# Patient Record
Sex: FEMALE | Race: WHITE | NOT HISPANIC OR LATINO | Employment: OTHER | URBAN - NONMETROPOLITAN AREA
[De-identification: names, ages, dates, MRNs, and addresses within clinical notes are randomized per-mention and may not be internally consistent; named-entity substitution may affect disease eponyms.]

---

## 2017-01-09 ENCOUNTER — COMMUNICATION - GICH (OUTPATIENT)
Dept: INTERNAL MEDICINE | Facility: OTHER | Age: 82
End: 2017-01-09

## 2017-01-09 DIAGNOSIS — G47.00 INSOMNIA: ICD-10-CM

## 2017-01-12 ENCOUNTER — OFFICE VISIT - GICH (OUTPATIENT)
Dept: INTERNAL MEDICINE | Facility: OTHER | Age: 82
End: 2017-01-12

## 2017-01-12 ENCOUNTER — HISTORY (OUTPATIENT)
Dept: INTERNAL MEDICINE | Facility: OTHER | Age: 82
End: 2017-01-12

## 2017-01-12 DIAGNOSIS — Z12.31 ENCOUNTER FOR SCREENING MAMMOGRAM FOR MALIGNANT NEOPLASM OF BREAST: ICD-10-CM

## 2017-01-12 DIAGNOSIS — G47.00 INSOMNIA: ICD-10-CM

## 2017-01-12 DIAGNOSIS — E78.5 HYPERLIPIDEMIA: ICD-10-CM

## 2017-01-12 DIAGNOSIS — I10 ESSENTIAL (PRIMARY) HYPERTENSION: ICD-10-CM

## 2017-01-12 DIAGNOSIS — E03.9 HYPOTHYROIDISM: ICD-10-CM

## 2017-01-12 LAB
A/G RATIO - HISTORICAL: 1.8 (ref 1–2)
ALBUMIN SERPL-MCNC: 4.5 G/DL (ref 3.5–5.7)
ALP SERPL-CCNC: 13 IU/L (ref 34–104)
ALT (SGPT) - HISTORICAL: 20 IU/L (ref 7–52)
ANION GAP - HISTORICAL: 3 (ref 5–18)
AST SERPL-CCNC: 21 IU/L (ref 13–39)
BILIRUB SERPL-MCNC: 1 MG/DL (ref 0.3–1)
BUN SERPL-MCNC: 17 MG/DL (ref 7–25)
BUN/CREAT RATIO - HISTORICAL: 20
CALCIUM SERPL-MCNC: 10.1 MG/DL (ref 8.6–10.3)
CHLORIDE SERPLBLD-SCNC: 103 MMOL/L (ref 98–107)
CHOL/HDL RATIO - HISTORICAL: 3.55
CHOLESTEROL TOTAL: 167 MG/DL
CO2 SERPL-SCNC: 30 MMOL/L (ref 21–31)
CREAT SERPL-MCNC: 0.87 MG/DL (ref 0.7–1.3)
GFR IF NOT AFRICAN AMERICAN - HISTORICAL: >60 ML/MIN/1.73M2
GLOBULIN - HISTORICAL: 2.5 G/DL (ref 2–3.7)
GLUCOSE SERPL-MCNC: 94 MG/DL (ref 70–105)
HDLC SERPL-MCNC: 47 MG/DL (ref 23–92)
HEMOGLOBIN: 15.2 G/DL (ref 12–16)
LDLC SERPL CALC-MCNC: 92 MG/DL
MCV RBC AUTO: 94 FL (ref 80–100)
NON-HDL CHOLESTEROL - HISTORICAL: 120 MG/DL
PATIENT STATUS - HISTORICAL: NORMAL
POTASSIUM SERPL-SCNC: 4 MMOL/L (ref 3.5–5.1)
PROT SERPL-MCNC: 7 G/DL (ref 6.4–8.9)
SODIUM SERPL-SCNC: 136 MMOL/L (ref 133–143)
T4 FREE SERPL-MCNC: 0.94 NG/DL (ref 0.58–1.64)
TRIGL SERPL-MCNC: 141 MG/DL
TSH - HISTORICAL: 0.92 UIU/ML (ref 0.34–5.6)

## 2017-01-13 ENCOUNTER — HOSPITAL ENCOUNTER (OUTPATIENT)
Dept: RADIOLOGY | Facility: OTHER | Age: 82
End: 2017-01-13
Attending: INTERNAL MEDICINE

## 2017-01-13 ENCOUNTER — HISTORY (OUTPATIENT)
Dept: RADIOLOGY | Facility: OTHER | Age: 82
End: 2017-01-13

## 2017-01-13 DIAGNOSIS — Z12.31 ENCOUNTER FOR SCREENING MAMMOGRAM FOR MALIGNANT NEOPLASM OF BREAST: ICD-10-CM

## 2017-01-13 DIAGNOSIS — E03.9 HYPOTHYROIDISM: ICD-10-CM

## 2017-01-22 ENCOUNTER — HISTORY (OUTPATIENT)
Dept: EMERGENCY MEDICINE | Facility: OTHER | Age: 82
End: 2017-01-22

## 2017-01-30 ENCOUNTER — HISTORY (OUTPATIENT)
Dept: INTERNAL MEDICINE | Facility: OTHER | Age: 82
End: 2017-01-30

## 2017-01-30 ENCOUNTER — OFFICE VISIT - GICH (OUTPATIENT)
Dept: INTERNAL MEDICINE | Facility: OTHER | Age: 82
End: 2017-01-30

## 2017-01-30 DIAGNOSIS — I10 ESSENTIAL (PRIMARY) HYPERTENSION: ICD-10-CM

## 2017-01-30 DIAGNOSIS — G47.00 INSOMNIA: ICD-10-CM

## 2017-03-21 ENCOUNTER — COMMUNICATION - GICH (OUTPATIENT)
Dept: INTERNAL MEDICINE | Facility: OTHER | Age: 82
End: 2017-03-21

## 2017-03-21 DIAGNOSIS — I10 ESSENTIAL (PRIMARY) HYPERTENSION: ICD-10-CM

## 2017-07-30 ENCOUNTER — COMMUNICATION - GICH (OUTPATIENT)
Dept: INTERNAL MEDICINE | Facility: OTHER | Age: 82
End: 2017-07-30

## 2017-07-30 DIAGNOSIS — G47.00 INSOMNIA: ICD-10-CM

## 2017-08-12 ENCOUNTER — COMMUNICATION - GICH (OUTPATIENT)
Dept: INTERNAL MEDICINE | Facility: OTHER | Age: 82
End: 2017-08-12

## 2017-08-12 DIAGNOSIS — I10 ESSENTIAL (PRIMARY) HYPERTENSION: ICD-10-CM

## 2017-09-15 ENCOUNTER — COMMUNICATION - GICH (OUTPATIENT)
Dept: INTERNAL MEDICINE | Facility: OTHER | Age: 82
End: 2017-09-15

## 2017-09-15 DIAGNOSIS — I10 ESSENTIAL (PRIMARY) HYPERTENSION: ICD-10-CM

## 2017-10-21 ENCOUNTER — COMMUNICATION - GICH (OUTPATIENT)
Dept: INTERNAL MEDICINE | Facility: OTHER | Age: 82
End: 2017-10-21

## 2017-10-21 DIAGNOSIS — I10 ESSENTIAL (PRIMARY) HYPERTENSION: ICD-10-CM

## 2017-10-25 ENCOUNTER — COMMUNICATION - GICH (OUTPATIENT)
Dept: INTERNAL MEDICINE | Facility: OTHER | Age: 82
End: 2017-10-25

## 2017-10-25 DIAGNOSIS — I10 ESSENTIAL (PRIMARY) HYPERTENSION: ICD-10-CM

## 2017-10-30 ENCOUNTER — COMMUNICATION - GICH (OUTPATIENT)
Dept: INTERNAL MEDICINE | Facility: OTHER | Age: 82
End: 2017-10-30

## 2017-10-30 DIAGNOSIS — G47.00 INSOMNIA: ICD-10-CM

## 2017-11-28 ENCOUNTER — COMMUNICATION - GICH (OUTPATIENT)
Dept: INTERNAL MEDICINE | Facility: OTHER | Age: 82
End: 2017-11-28

## 2017-11-28 DIAGNOSIS — G47.00 INSOMNIA: ICD-10-CM

## 2017-12-28 NOTE — TELEPHONE ENCOUNTER
Patient Information     Patient Name MRN Sex Bea Fortune [id number] Female 1935      Telephone Encounter by Jenni Rivera RN at 9/15/2017  1:52 PM     Author:  Jenni Rivera RN Service:  (none) Author Type:  NURS- Registered Nurse     Filed:  9/15/2017  1:55 PM Encounter Date:  9/15/2017 Status:  Signed     :  Jenni Rivera RN (NURS- Registered Nurse)            Patient's pharmacy is requesting a change of Atenolol due to long term shortage of drug.     Medication:atenolol (TENORMIN) 100 mg tablet  Prescription #:[id number]    Qty:90 tablet   Ref:1  Start:8/15/2017   End:              Route:Oral                  JACQUES:No   Class:eRx    Sig:TAKE ONE TABLET BY MOUTH ONCE DAILY    Routed message to Evan Giles MD for consideration of a new Rx for another med.     Jenni Rivera RN ....................  9/15/2017   1:54 PM

## 2017-12-28 NOTE — TELEPHONE ENCOUNTER
Patient Information     Patient Name MRN Bea Ybarra [id number] Female 1935      Telephone Encounter by Judy Alvarado RN at 10/24/2017  4:22 PM     Author:  Judy Alvarado RN Service:  (none) Author Type:  NURS- Registered Nurse     Filed:  10/24/2017  4:24 PM Encounter Date:  10/21/2017 Status:  Signed     :  Judy Alvarado RN (NURS- Registered Nurse)            Changed to Metoprolol on 9/15/17 due to long term shortage of Atenolol  Unable to complete prescription refill per RN Medication Refill Policy.................... JUDY ALVARADO RN ....................  10/24/2017   4:23 PM

## 2017-12-28 NOTE — TELEPHONE ENCOUNTER
Patient Information     Patient Name MRN Sex Bea Fortune [id number] Female 1935      Telephone Encounter by Jenni Rosado RN at 2017  1:15 PM     Author:  Jenni Rosado RN Service:  (none) Author Type:  NURS- Registered Nurse     Filed:  2017  1:17 PM Encounter Date:  2017 Status:  Signed     :  Jenni Rosado RN (NURS- Registered Nurse)            This is a Refill request from: [facility]  Name of Medication: zolpidem  Quantity requested: 90  Last fill date: 5/3/17  Due for refill: yes  Last visit with AJITH GILES was on: 2017 in [facility]  PCP:  Ajith Giles MD  Controlled Substance Agreement:  None found  Diagnosis r/t this medication request: Insomnia, unspecified type     Unable to complete prescription refill per RN Medication Refill Policy.................... Jenni Rosado RN ....................  2017   1:16 PM

## 2017-12-28 NOTE — TELEPHONE ENCOUNTER
Patient Information     Patient Name MRN Sex Bea Fortune [id number] Female 1935      Telephone Encounter by Judy Lutz RN at 8/15/2017  3:29 PM     Author:  Judy Lutz RN Service:  (none) Author Type:  NURS- Registered Nurse     Filed:  8/15/2017  3:35 PM Encounter Date:  2017 Status:  Signed     :  Judy Lutz RN (NURS- Registered Nurse)            Beta Blockers     Office visit in the past 12 months or per provider note.    Last visit with AJITH HOWE was on: 2017 in [facility]  Next visit with AJITH HOWE is on: No future appointment listed with this provider  Next visit with Internal Medicine is on: No future appointment listed in this department    Max refill for 12 months from last office visit or per provider note.  Prescription refilled per RN Medication Refill Policy.................... JUDY LUTZ RN ....................  8/15/2017   3:34 PM

## 2017-12-28 NOTE — TELEPHONE ENCOUNTER
"Patient Information     Patient Name MRN Bea Ybarra [id number] Female 1935      Telephone Encounter by Judy Alvarado RN at 10/25/2017 12:14 PM     Author:  Judy Alvarado RN Service:  (none) Author Type:  NURS- Registered Nurse     Filed:  10/25/2017 12:16 PM Encounter Date:  10/25/2017 Status:  Signed     :  Judy Alvarado RN (NURS- Registered Nurse)            Received faxed message from [facility] pharmacy in regards to Atenolol being change to Toprol.    Note: \" patient would like to go back to Atenolol. We have it on - hand, and it is much less expensive. Please send new Rx\"..    JUDY ALVARADO RN ....................  10/25/2017   12:16 PM          "

## 2017-12-28 NOTE — TELEPHONE ENCOUNTER
Patient Information     Patient Name MRN Sex Bea Fortune [id number] Female 1935      Telephone Encounter by Jessica Ramirez DO at 2017  3:11 PM     Author:  Jessica Ramirez DO Service:  (none) Author Type:  PHYS- Osteopathic     Filed:  2017  3:11 PM Encounter Date:  10/30/2017 Status:  Signed     :  Jessica Ramirez DO (PHYS- Osteopathic)            Prescription printed and ready for faxing

## 2017-12-28 NOTE — TELEPHONE ENCOUNTER
Patient Information     Patient Name MRN Sex Bea Fortune [id number] Female 1935      Telephone Encounter by Jenni Rosado RN at 2017  2:58 PM     Author:  Jenni Rosado RN Service:  (none) Author Type:  NURS- Registered Nurse     Filed:  2017  3:00 PM Encounter Date:  10/30/2017 Status:  Signed     :  Jenni Rosado RN (NURS- Registered Nurse)            This is a Refill request from: [facility]  Name of Medication: zolpidem  Quantity requested: #90  Last fill date: 17  Due for refill: yes  Last visit with EVAN GILES was on: 2017 in [facility]  PCP:  Evan Giles MD  Controlled Substance Agreement:  na   Diagnosis r/t this medication request: insomnia      Unable to complete prescription refill per RN Medication Refill Policy.................... Jenni Rosado RN ....................  2017   2:58 PM

## 2017-12-28 NOTE — TELEPHONE ENCOUNTER
Patient Information     Patient Name MRN Sex Bea Fortune [id number] Female 1935      Telephone Encounter by Mirna Moreau RN at 2017  4:02 PM     Author:  Mirna Moreau RN Service:  (none) Author Type:  NURS- Registered Nurse     Filed:  2017  4:07 PM Encounter Date:  2017 Status:  Signed     :  Mirna Moreau RN (NURS- Registered Nurse)            This is a Refill request from: [facility]  Name of Medication: Zolpidem (AMBIEN) 5 mg tablet  Quantity requested: 90  Last fill date: 17  Due for refill: 17  Last visit with PCP:  Evan Giles MD was on 2017  Controlled Substance Agreement: none  Diagnosis r/t this medication request: Insomnia     Unable to complete prescription refill per RN Medication Refill Policy.................... Mirna Moreau RN ....................  2017   4:02 PM

## 2018-01-01 ENCOUNTER — COMMUNICATION - GICH (OUTPATIENT)
Dept: INTERNAL MEDICINE | Facility: OTHER | Age: 83
End: 2018-01-01

## 2018-01-01 DIAGNOSIS — I10 ESSENTIAL (PRIMARY) HYPERTENSION: ICD-10-CM

## 2018-01-02 NOTE — TELEPHONE ENCOUNTER
Patient Information     Patient Name MRN Sex Bea Fortune [id number] Female 1935      Telephone Encounter by Stacy Grier RN at 1/10/2017  9:33 AM     Author:  Stacy Grier RN Service:  (none) Author Type:  NURS- Registered Nurse     Filed:  1/10/2017  9:35 AM Encounter Date:  2017 Status:  Signed     :  Stacy Grier RN (NURS- Registered Nurse)            This is a Refill request from: [facility]  Name of Medication: zolpidem (AMBIEN) 5 mg tablet  TAKE ONE TABLET BY MOUTH AT BEDTIME  Quantity requested: 90  Last fill date: 10/13/16  Due for refill: 17  Last visit with AJITH HOWE was on: 2015 in [facility]  PCP:  Ajith Howe MD  Controlled Substance Agreement:  none   Diagnosis r/t this medication request: insomnia       Pt due for annual exam has not been seen since 7/28/15     Unable to complete prescription refill per RN Medication Refill Policy.................... STACY GRIER RN ....................  1/10/2017   9:33 AM

## 2018-01-03 NOTE — TELEPHONE ENCOUNTER
Patient Information     Patient Name MRN Sex Bea Fortune [id number] Female 1935      Telephone Encounter by Marilyn Chun RN at 3/22/2017  8:04 AM     Author:  Marilyn Chun RN Service:  (none) Author Type:  NURS- Registered Nurse     Filed:  3/22/2017  8:06 AM Encounter Date:  3/21/2017 Status:  Signed     :  Marilyn Chun RN (NURS- Registered Nurse)            Calcium Channel Blockers    Office visit in the past 12 months or per provider note.    Last visit with AJITH HOWE was on: 2017 in [facility]  Next visit with AJITH HOWE is on: No future appointment listed with this provider  Next visit with Internal Medicine is on: No future appointment listed in this department  BP Readings from Last 4 Encounters:    17 128/74   17 152/61   17 174/80   17 (!) 170/102       Review last provider visit note.  If BP reviewed and plan is noted, can refill.  Max refill for 12 months from last office visit or per provider note.  Prescription refilled per RN Medication Refill Policy.................... MARILYN CHUN RN ....................  3/22/2017   8:04 AM

## 2018-01-03 NOTE — ADDENDUM NOTE
Patient Information     Patient Name MRN Sex Bea Fortune [id number] Female 1935      Addendum Note by Evan Howe MD at 2017 11:20 AM     Author:  Evan Howe MD Service:  (none) Author Type:  Physician     Filed:  2017 11:20 AM Encounter Date:  2017 Status:  Signed     :  Evan Howe MD (Physician)       Addended by: EVAN HOWE on: 2017 11:20 AM        Modules accepted: Orders

## 2018-01-03 NOTE — PROGRESS NOTES
Patient Information     Patient Name MRN Bea Ybarra [id number] Female 1935      Progress Notes by Evan Giles MD at 2017 10:40 AM     Author:  Evan Giles MD Service:  (none) Author Type:  Physician     Filed:  2017 11:17 AM Encounter Date:  2017 Status:  Signed     :  Evan Giles MD (Physician)            SUBJECTIVE:    Bea Flores is a 81 y.o. female who presents for comprehensive review of their multiple medical problems and review of medications, renewal of medications and update on necessary health maintenance issues.      HPI Comments: She is here today for complete evaluation. She needs a refill on some of her medications and some updates. She has treated hypertension. She is on multiple drug therapy for this. Her blood pressure is somewhat high when she comes in today. She tells me that is never really been that high before. She feels well and has no complaints.    She also has a history of hypothyroidism. She is due for recheck on her thyroid. She does not have any symptoms of under or overactive thyroid. She has treated hyperlipidemia and tolerates her simvastatin without difficulty.    She does have some anxiety and is stable on citalopram. She has insomnia and is stable on Ambien 5 mg daily at bedtime. She does need refills. She does not get any immunizations because of some intolerances in the past but is asking today about shingles shot. I reviewed that with her. She is due for mammogram. She is due for lab work.    I spent time today reviewing her past medical history, past surgical history, family history and social history.      Allergies     Allergen  Reactions     Codeine *Unknown     Lisinopril *Unknown     Pneumovax 23 [Pneumococcal 23-Bertha Ps Vaccine] *Unknown     Trazodone *Unknown   ,   Current Outpatient Prescriptions     Medication  Sig     amLODIPine (NORVASC) 5 mg tablet Take 1 tablet by mouth once daily.     aspirin 325 mg tablet Take  325 mg by mouth once daily with a meal.     atenolol (TENORMIN) 100 mg tablet Take 1 tablet by mouth once daily.     calcium carbonate-vit D3, 600 mg-400 units, (CALCIUM PLUS) tablet Take 1 tablet by mouth once daily with a meal.     citalopram (CELEXA) 20 mg tablet Take 1 tablet by mouth once daily.     hydrochlorothiazide (HCTZ) 25 mg tablet Take 1 tablet by mouth once daily.     levothyroxine (SYNTHROID) 100 mcg tablet Take 1 tablet by mouth before breakfast.     simvastatin (ZOCOR) 40 mg tablet Take 1 tablet by mouth at bedtime.     zolpidem (AMBIEN) 5 mg tablet TAKE ONE TABLET BY MOUTH AT BEDTIME     No current facility-administered medications for this visit.      Medications have been reviewed by me and are current to the best of my knowledge and ability. 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       Cataract removal  Bilateral     and   Social History      Substance Use Topics        Smoking status:  Former Smoker     Types: Cigarettes     Quit date: 3/12/1990     Smokeless tobacco:  Not on file     Alcohol use  No     Family Status     Relation   Status     Father  at age 64     Mother  at age 80     Sister Alive     Sister Alive     Sister Alive     Brother Alive     Child Alive     Child Alive     Social History     Social History        Marital status:       Spouse name: N/A     Number of children:  N/A     Years of education:  N/A     Social History Main Topics        Smoking status:  Former Smoker     Types: Cigarettes     Quit date: 3/12/1990     Smokeless tobacco:  None     Alcohol use  No     Drug use:  No     Sexual activity:  Not Asked     Other Topics  Concern     None      Social History Narrative     .    of heart disease.  She is a retired  and travels with her daughter who lives in [place].  Presently working in the [employer] area at [employer].  Lives in town.  Two children.                               REVIEW OF SYSTEMS:  Review of Systems   Constitutional: Negative for chills, diaphoresis, fever, malaise/fatigue and weight loss.   HENT: Negative for congestion, ear pain, nosebleeds, sore throat and tinnitus.    Eyes: Negative for blurred vision, double vision, photophobia, pain, discharge and redness.   Respiratory: Negative for cough, hemoptysis, sputum production, shortness of breath and wheezing.    Cardiovascular: Negative for chest pain, palpitations, orthopnea, claudication, leg swelling and PND.   Gastrointestinal: Negative for abdominal pain, blood in stool, constipation, diarrhea, heartburn, nausea and vomiting.   Genitourinary: Negative for dysuria, flank pain and hematuria.   Musculoskeletal: Negative for back pain, joint pain, myalgias and neck pain.   Skin: Negative for itching and rash.   Neurological: Negative for dizziness, tingling, tremors, speech change, loss of consciousness, weakness and headaches.   Psychiatric/Behavioral: Negative for depression, hallucinations, memory loss, substance abuse and suicidal ideas. The patient is not nervous/anxious.        OBJECTIVE:  BP  "(!) 170/102  Pulse 72  Ht 1.656 m (5' 5.2\")  Wt 53.9 kg (118 lb 12.8 oz)  Breastfeeding? No  BMI 19.65 kg/m2    EXAM:   Physical Exam   Constitutional: She is oriented to person, place, and time and well-developed, well-nourished, and in no distress. No distress.   HENT:   Head: Normocephalic and atraumatic.   Right Ear: Tympanic membrane and external ear normal.   Left Ear: Tympanic membrane and external ear normal.   Nose: Nose normal.   Mouth/Throat: Oropharynx is clear and moist. No oropharyngeal exudate or posterior oropharyngeal erythema.   Eyes: Conjunctivae are normal. Pupils are equal, round, and reactive to light. Right eye exhibits no discharge. Left eye exhibits no discharge. No scleral icterus.   Neck: Normal range of motion. Neck supple. No JVD present. Carotid bruit is not present. No tracheal deviation present. No thyroid mass and no thyromegaly present.   Cardiovascular: Normal rate, regular rhythm and normal heart sounds.  Exam reveals no gallop and no friction rub.    No murmur heard.  Pulmonary/Chest: Effort normal and breath sounds normal. No respiratory distress. She has no decreased breath sounds. She has no wheezes. She has no rhonchi. She has no rales. She exhibits no tenderness.   Abdominal: Soft. Bowel sounds are normal. She exhibits no distension and no mass. There is no hepatosplenomegaly. There is no tenderness. There is no rebound and no guarding.   Musculoskeletal: Normal range of motion. She exhibits no edema or tenderness.   Lymphadenopathy:     She has no cervical adenopathy.   Neurological: She is alert and oriented to person, place, and time. She displays normal reflexes. No cranial nerve deficit. Gait normal. Coordination normal.   Skin: Skin is warm and dry. No rash noted. She is not diaphoretic. No erythema.   Psychiatric: Affect normal.   Nursing note and vitals reviewed.      ASSESSMENT/PLAN:    ICD-10-CM    1. Hypothyroidism, unspecified type E03.9 TSH      T4,FREE      XR " MAMMO BILAT SCREENING   2. Hyperlipidemia, unspecified hyperlipidemia type E78.5 LIPID PANEL   3. HYPERTENSION I10 COMP METABOLIC PANEL   4. Insomnia, unspecified type G47.00    5. Encounter for screening mammogram for malignant neoplasm of breast  Z12.31 XR MAMMO BILAT SCREENING        Plan:  Her exam today is normal. Her blood pressure is high, this is new. She normally checks her blood pressure at [facility] and at her cough at home and it has been fine although she admits she hasn't done it for a while. She will start doing that again over the next couple of weeks and will return if her blood pressure is high. Medications are refilled today, no changes are made. She is scheduled for a mammogram. Complete lab drawn and pending and I will send her a letter with the results and any recommendations. Follow-up will be annually if there are no new problems or concerns.

## 2018-01-03 NOTE — PROGRESS NOTES
Patient Information     Patient Name MRN Sex Bea Fortune [id number] Female 1935      Progress Notes by [facility] at 2017  1:15 PM     Author:  Penny Dee R.T. ([facility]) Service:  (none) Author Type:  (none)     Filed:  2017  1:16 PM Date of Service:  2017  1:15 PM Status:  Signed     :  Penny Dee R.T. ([facility]) ([facility] - Registered Radiologic Technologist)            Falls Risk Criteria:    Age 65 and older or under age 4        Sensory deficits    Poor vision    Use of ambulatory aides    Impaired judgment    Unable to walk independently    Meets High Risk criteria for falls:  Yes               1.  Do you have dizziness or vertigo?    no                    2.  Do you need help standing or walking?   no                 3.  Have you fallen within the last 6 months?    no           4.  Has the patient been fasting?      no       If any risks are marked Yes, the following interventions are utilized:    Do not leave patient unattended     Assist patient in the dressing room and bathroom    Have ambulatory aides available throughout procedure    Involve patient s family if available

## 2018-01-03 NOTE — PROGRESS NOTES
Patient Information     Patient Name MRN Sex Bea Fortune [id number] Female 1935      Progress Notes by Evan Giles MD at 2017  2:30 PM     Author:  Evan Giles MD Service:  (none) Author Type:  Physician     Filed:  2017  2:46 PM Encounter Date:  2017 Status:  Signed     :  Evan Giles MD (Physician)            SUBJECTIVE:    Bea Flores is a 81 y.o. female who presents for recheck on BP.    HPI Comments: She is here for follow-up on her blood pressure. She has been monitoring her blood pressure at home as well as at work at [employer]. She brings in those values today to review. 90% of them are in target range, a few are a little bit on the high side. She admits that she does have anxiety about the whole thing. Medications remain the same. She has no other complaints. [place]. She needs to have a refill on her Ambien.      Allergies     Allergen  Reactions     Codeine *Unknown     Lisinopril *Unknown     Pneumovax 23 [Pneumococcal 23-Bertha Ps Vaccine] *Unknown     Trazodone *Unknown   ,   Current Outpatient Prescriptions     Medication  Sig     acetaminophen (TYLENOL EXTRA STRGTH) 500 mg tablet Take 1,000 mg by mouth every 6 hours if needed. Max acetaminophen dose: 4000mg in 24 hrs.   Indications: Pain     amLODIPine (NORVASC) 5 mg tablet Take 1 tablet by mouth once daily.     aspirin 325 mg tablet Take 325 mg by mouth once daily with a meal.     atenolol (TENORMIN) 100 mg tablet Take 1 tablet by mouth once daily.     calcium carbonate-vit D3, 600 mg-400 units, (CALCIUM PLUS) tablet Take 1 tablet by mouth once daily with a meal.     citalopram (CELEXA) 20 mg tablet Take 1 tablet by mouth once daily.     hydroCHLOROthiazide (HCTZ) 25 mg tablet Take 1 tablet by mouth once daily.     levothyroxine (SYNTHROID) 100 mcg tablet Take 1 tablet by mouth before breakfast.     simvastatin (ZOCOR) 40 mg tablet Take 1 tablet by mouth at bedtime.      traMADol (ULTRAM) 50 mg tablet Take 1 tablet by mouth every 6 hours if needed for Pain.     zolpidem (AMBIEN) 5 mg tablet TAKE ONE TABLET BY MOUTH AT BEDTIME     No current facility-administered medications for this visit.      Medications have been reviewed by me and are current to the best of my knowledge and ability. 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      OBJECTIVE:  /74  Pulse 68  Wt 54.4 kg (120 lb)  Breastfeeding? No  BMI 19.97 kg/m2    EXAM:   Physical Exam   Constitutional: She is well-developed, well-nourished, and in no distress. No distress.   Skin: She is not diaphoretic.   Nursing note and vitals reviewed.      ASSESSMENT/PLAN:    ICD-10-CM    1. HYPERTENSION I10    2. Insomnia, unspecified type G47.00 zolpidem (AMBIEN) 5 mg tablet        Plan:  She was reassured that her blood pressure looks fine. Continue current medications. Refilled her Ambien. She will follow-up as needed.

## 2018-01-03 NOTE — NURSING NOTE
Patient Information     Patient Name MRN Sex Bea Fortune [id number] Female 1935      Nursing Note by Maday So LPN at 2017 10:40 AM     Author:  Maday So LPN Service:  (none) Author Type:  NURS- Licensed Practical Nurse     Filed:  2017 10:48 AM Encounter Date:  2017 Status:  Signed     :  Maday So LPN (NURS- Licensed Practical Nurse)            The patient is here today to get refills on her medications.  Maday So LPN......2017  10:39 AM

## 2018-01-03 NOTE — NURSING NOTE
Patient Information     Patient Name MRN Sex Bea Fortune [id number] Female 1935      Nursing Note by Maday So LPN at 2017  2:30 PM     Author:  Maday So LPN Service:  (none) Author Type:  NURS- Licensed Practical Nurse     Filed:  2017  2:31 PM Encounter Date:  2017 Status:  Signed     :  Maday So LPN (NURS- Licensed Practical Nurse)            The patient is here today to have a follow up on her blood pressure.  Maday So LPN......2017  2:22 PM

## 2018-01-03 NOTE — TELEPHONE ENCOUNTER
Patient Information     Patient Name MRN Sex Bea Fortune [id number] Female 1935      Telephone Encounter by Maday So LPN at 1/10/2017  1:30 PM     Author:  Maday So LPN Service:  (none) Author Type:  NURS- Licensed Practical Nurse     Filed:  1/10/2017  1:32 PM Encounter Date:  2017 Status:  Signed     :  Maday So LPN (NURS- Licensed Practical Nurse)            Contacted the patient and let her know she is due for a yearly check up. The patient was transferred to scheduling to set up a appointment.  Maday So LPN......1/10/2017  1:32 PM

## 2018-01-03 NOTE — TELEPHONE ENCOUNTER
Patient Information     Patient Name MRN Sex Bea Fortune [id number] Female 1935      Telephone Encounter by Maday So LPN at 1/10/2017 10:48 AM     Author:  Maday So LPN Service:  (none) Author Type:  NURS- Licensed Practical Nurse     Filed:  1/10/2017 10:49 AM Encounter Date:  2017 Status:  Signed     :  Maday So LPN (NURS- Licensed Practical Nurse)            Left a message the patient to call back.  Maday So LPN......1/10/2017  10:49 AM

## 2018-01-26 VITALS
DIASTOLIC BLOOD PRESSURE: 102 MMHG | HEART RATE: 72 BPM | BODY MASS INDEX: 19.79 KG/M2 | SYSTOLIC BLOOD PRESSURE: 170 MMHG | HEIGHT: 65 IN | WEIGHT: 118.8 LBS

## 2018-01-26 VITALS
WEIGHT: 120 LBS | BODY MASS INDEX: 19.85 KG/M2 | SYSTOLIC BLOOD PRESSURE: 128 MMHG | DIASTOLIC BLOOD PRESSURE: 74 MMHG | HEART RATE: 68 BPM

## 2018-02-11 ENCOUNTER — APPOINTMENT (OUTPATIENT)
Dept: GENERAL RADIOLOGY | Facility: OTHER | Age: 83
End: 2018-02-11
Attending: EMERGENCY MEDICINE
Payer: MEDICARE

## 2018-02-11 ENCOUNTER — HOSPITAL ENCOUNTER (EMERGENCY)
Facility: OTHER | Age: 83
Discharge: HOME | End: 2018-02-11
Attending: EMERGENCY MEDICINE | Admitting: EMERGENCY MEDICINE
Payer: MEDICARE

## 2018-02-11 VITALS
HEART RATE: 66 BPM | BODY MASS INDEX: 20.83 KG/M2 | SYSTOLIC BLOOD PRESSURE: 167 MMHG | WEIGHT: 125 LBS | DIASTOLIC BLOOD PRESSURE: 93 MMHG | HEIGHT: 65 IN | TEMPERATURE: 98.9 F | RESPIRATION RATE: 18 BRPM

## 2018-02-11 DIAGNOSIS — M54.50 ACUTE LEFT-SIDED LOW BACK PAIN WITHOUT SCIATICA: ICD-10-CM

## 2018-02-11 PROCEDURE — 99283 EMERGENCY DEPT VISIT LOW MDM: CPT | Mod: Z6 | Performed by: EMERGENCY MEDICINE

## 2018-02-11 PROCEDURE — 99283 EMERGENCY DEPT VISIT LOW MDM: CPT | Mod: 25 | Performed by: EMERGENCY MEDICINE

## 2018-02-11 PROCEDURE — 72100 X-RAY EXAM L-S SPINE 2/3 VWS: CPT

## 2018-02-11 RX ORDER — HYDROCHLOROTHIAZIDE 12.5 MG/1
25 CAPSULE ORAL DAILY
COMMUNITY
End: 2018-02-20

## 2018-02-11 RX ORDER — LATANOPROST 50 UG/ML
1 SOLUTION/ DROPS OPHTHALMIC AT BEDTIME
COMMUNITY
End: 2018-02-20

## 2018-02-11 RX ORDER — ASPIRIN 325 MG/1
325 TABLET, FILM COATED ORAL DAILY
COMMUNITY
End: 2018-02-20

## 2018-02-11 RX ORDER — HYDROCODONE BITARTRATE AND ACETAMINOPHEN 5; 325 MG/1; MG/1
1 TABLET ORAL EVERY 6 HOURS PRN
Qty: 12 TABLET | Refills: 0 | Status: SHIPPED | OUTPATIENT
Start: 2018-02-11 | End: 2018-02-20

## 2018-02-11 ASSESSMENT — ENCOUNTER SYMPTOMS: BACK PAIN: 1

## 2018-02-11 NOTE — ED AVS SNAPSHOT
[facility]    [address]    [place] [address]    Phone:  [phone]    Fax:  [phone]                                       Bea Flores   MRN: [id number]    Department:  [facility]   Date of Visit:  2/11/2018           After Visit Summary Signature Page     I have received my discharge instructions, and my questions have been answered. I have discussed any challenges I see with this plan with the nurse or doctor.    ..........................................................................................................................................  Patient/Patient Representative Signature      ..........................................................................................................................................  Patient Representative Print Name and Relationship to Patient    ..................................................               ................................................  Date                                            Time    ..........................................................................................................................................  Reviewed by Signature/Title    ...................................................              ..............................................  Date                                                            Time

## 2018-02-11 NOTE — DISCHARGE INSTRUCTIONS
1.  Stop tramadol  2.  Take Norco as instructed only if the pain is not tolerable; distended this pain medication may have the same sort of side effect and addictive properties as tramadol  3.  Consult with your primary care physician or an orthopedic surgeon for further care and recommendations if the pain persists

## 2018-02-11 NOTE — ED AVS SNAPSHOT
[facility]    [address]    [place] [address]    Phone:  [phone]    Fax:  [phone]                                       Bea Flores   MRN: [id number]    Department:  [facility]   Date of Visit:  2/11/2018           Patient Information     Date Of Birth          1935        Your diagnoses for this visit were:     Acute left-sided low back pain without sciatica        You were seen by Kristopher Blas MD.        Discharge Instructions       1.  Stop tramadol  2.  Take Norco as instructed only if the pain is not tolerable; distended this pain medication may have the same sort of side effect and addictive properties as tramadol  3.  Consult with your primary care physician or an orthopedic surgeon for further care and recommendations if the pain persists    24 Hour Appointment Hotline       To make an appointment at any [facility], call [phone]). If you don't have a family doctor or clinic, we will help you find one. [facility] clinics are conveniently located to serve the needs of you and your family.             Review of your medicines      START taking        Dose / Directions Last dose taken    HYDROcodone-acetaminophen 5-325 MG per tablet   Commonly known as:  NORCO   Dose:  1 tablet   Quantity:  12 tablet        Take 1 tablet by mouth every 6 hours as needed for moderate to severe pain   Refills:  0          Our records show that you are taking the medicines listed below. If these are incorrect, please call your family doctor or clinic.        Dose / Directions Last dose taken    AMBIEN PO   Dose:  5 mg        Take 5 mg by mouth   Refills:  0        aspirin - buffered 325 MG Tabs tablet   Commonly known as:  ASCRIPTIN   Dose:  325 mg        Take 325 mg by mouth daily   Refills:  0        ATENOLOL PO   Dose:  100 mg        Take 100 mg by mouth daily   Refills:  0        hydrochlorothiazide 12.5 MG capsule  "  Commonly known as:  MICROZIDE   Dose:  25 mg        Take 25 mg by mouth daily   Refills:  0        latanoprost 0.005 % ophthalmic solution   Commonly known as:  XALATAN   Dose:  1 drop        1 drop At Bedtime   Refills:  0        LEVOTHYROXINE SODIUM PO   Dose:  100 mcg        Take 100 mcg by mouth   Refills:  0        NORVASC PO   Dose:  5 mg        Take 5 mg by mouth daily   Refills:  0        SIMVASTATIN PO   Dose:  40 mg        Take 40 mg by mouth At Bedtime   Refills:  0                Prescriptions were sent or printed at these locations (1 Prescription)                   [facility] [address] [place] [address]    Telephone:  [phone]   Fax:  [phone]   Hours:                  Printed at Department/Unit printer (1 of 1)         HYDROcodone-acetaminophen (NORCO) 5-325 MG per tablet                Procedures and tests performed during your visit     XR Lumbar Spine 2/3 Views      Orders Needing Specimen Collection     None      Pending Results     No orders found from 2/9/2018 to 2/12/2018.            Pending Culture Results     No orders found from 2/9/2018 to 2/12/2018.            Thank you for choosing [facility]       Thank you for choosing [facility] for your care. Our goal is always to provide you with excellent care. Hearing back from our patients is one way we can continue to improve our services. Please take a few minutes to complete the written survey that you may receive in the mail after you visit with us. Thank you!        Leo Information     Leo lets you send messages to your doctor, view your test results, renew your prescriptions, schedule appointments and more. To sign up, go to [email or web address] . Click on \"Log in\" on the left side of the screen, which will take you to the Welcome page. Then click on \"Sign up Now\" on the right side of the page.     You will be asked to enter the access code listed " below, as well as some personal information. Please follow the directions to create your username and password.     Your access code is: [email or web address]  Expires: 2018 10:42 AM     Your access code will  in 90 days. If you need help or a new code, please call your [facility] clinic or [phone].        Care EveryWhere ID     This is your Care EveryWhere ID. This could be used by other organizations to access your [facility] medical records  [id number]        Equal Access to Services     [facility] : Jose R chacko Sojustin, waemre luqadaha, qaybtaz kaalmakathleen gamboa, christophe moura . So [facility] [phone].    ATENCIÓN: Si habla español, tiene a noel disposición servicios gratuitos de asistencia lingüística. Llame al [phone].    We comply with applicable federal civil rights laws and [place] laws. We do not discriminate on the basis of race, color, national origin, age, disability, sex, sexual orientation, or gender identity.            After Visit Summary       This is your record. Keep this with you and show to your community pharmacist(s) and doctor(s) at your next visit.

## 2018-02-11 NOTE — ED PROVIDER NOTES
History     Chief Complaint   Patient presents with     Hip Pain     Pt was shoveling thursday afternoon, while shoveling felt something pull in her left hip, currently having hip pain     HPI Comments: Is a 82-year-old female who is presenting with complaints of acute left-sided lower back pain.  Patient states she was clearing snow Thursday and she felt some crunching popping noise in that area and since then has had the pain which does not radiate to the buttock or to the lower left extremity nor associated with numbness tingling or weakness or difficult with ambulation.  Patient states she has osteoporosis and concerned underlying acute fracture.  She does report she has a history of chronic left hip pain for which she has been seen by an orthopedic surgeon physical therapist.  The hip pain has not changed in severity and character recently.     Bea Flores is a 82 year old female who     Problem List:    There are no active problems to display for this patient.       Past Medical History:    Past Medical History:   Diagnosis Date     Cerebral infarction (H)      Essential (primary) hypertension      Hyperlipidemia      Hypothyroidism      Migraine with aura and without status migrainosus, not intractable      Other specified disorders of bone density and structure, unspecified site (CODE)      Postmenopausal atrophic vaginitis      Sciatica of right side      Transient global amnesia      Varicose veins of other specified sites (CODE)        Past Surgical History:    Past Surgical History:   Procedure Laterality Date     COLONOSCOPY      11/06/06,Colonoscopy, next due in 2016.     EXTRACAPSULAR CATARACT EXTRATION WITH INTRAOCULAR LENS IMPLANT      No Comments Provided     OTHER SURGICAL HISTORY      205093,BIOPSY BREAST,Breast biopsy in each breast     TONSILLECTOMY, ADENOIDECTOMY, COMBINED      No Comments Provided       Family History:    Family History   Problem Relation Age of Onset     HEART DISEASE  "Father      Heart Disease,MI, AAA     Other - See Comments Father      Stroke     HEART DISEASE Mother      Heart Disease     Breast Cancer Sister      Cancer-breast     Hypertension Sister      Hypertension     Hypertension Sister      Hypertension     Hypertension Sister      Hypertension     Type 2 Diabetes Sister      Diabetes type II     Hypertension Brother      Hypertension     HEART DISEASE Brother      Heart Disease,AAA     Arthritis Brother      Arthritis,RA       Social History:  Marital Status:   [5]  Social History   Substance Use Topics     Smoking status: Former Smoker     Types: Cigarettes     Quit date: 3/12/1990     Smokeless tobacco: Not on file     Alcohol use No        Medications:      ATENOLOL PO   AmLODIPine Besylate (NORVASC PO)   hydrochlorothiazide (MICROZIDE) 12.5 MG capsule   LEVOTHYROXINE SODIUM PO   SIMVASTATIN PO   latanoprost (XALATAN) 0.005 % ophthalmic solution   aspirin - buffered (ASCRIPTIN) 325 MG TABS tablet   Zolpidem Tartrate (AMBIEN PO)   HYDROcodone-acetaminophen (NORCO) 5-325 MG per tablet         Review of Systems   Musculoskeletal: Positive for back pain.   All other systems reviewed and are negative.      Physical Exam   BP: (!) 167/93  Pulse: 66  Temp: 98.9  F (37.2  C)  Resp: 18  Height: 165.1 cm (5' 5\")  Weight: 56.7 kg (125 lb)      Physical Exam   Constitutional: She is oriented to person, place, and time. She appears well-developed and well-nourished. No distress.   Cardiovascular: Normal rate, regular rhythm, normal heart sounds and intact distal pulses.    Pulmonary/Chest: Effort normal and breath sounds normal. No respiratory distress. She has no wheezes. She has no rales. She exhibits no tenderness.   Abdominal: Soft. Bowel sounds are normal. She exhibits no distension. There is no tenderness. There is no rebound and no guarding.   Musculoskeletal: Normal range of motion. She exhibits no edema or tenderness.   Tenderness over the left SI joint without " crepitance, erythema or increased warmth to touch   Neurological: She is oriented to person, place, and time.       ED Course     ED Course     Results for orders placed or performed during the hospital encounter of 02/11/18   XR Lumbar Spine 2/3 Views    Narrative    PROCEDURE: XR LUMBAR SPINE 2-3 VIEWS 2/11/2018 10:03 AM    HISTORY: acute traumatic back pain;     COMPARISONS: None.    TECHNIQUE: AP and lateral    FINDINGS: The lumbar discs are normal in height. There are facet joint  degenerative changes with mild nonspondylolytic spondylolisthesis of  L3 on L4 L4 and on L5. Advanced degenerative changes the lower lumbar  spine. The paravertebral soft tissues appear normal.         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 There is mild tenderness over left SI joint on palpation without signs of infections. No focal neurologic findings on examination.  Is concerned about fracture patient her history of osteoporosis.  However lumbar spine x-ray does not reveal obvious osteopenia or fracture or subluxation.  Radiologist agrees with that assessment.  Patient is able to ablate without difficulties.  Patient's wants prescription for home and she was given Norco 5/325 dispensing 12 tablets.  She has been taken tramadol which she says makes her very sleepy and gives a dry mouth she wants to stop that.  She was advised to stop it but she also understands the Norco may have similar side effects.  She understands these medications are quite addictive.      Procedures               Critical Care time:  none               Labs Ordered and Resulted from Time of ED Arrival Up to the Time of Departure from the ED - No data to display    Assessments & Plan (with Medical Decision Making)     I have reviewed the nursing notes.    I have reviewed the findings, diagnosis, plan and need  for follow up with the patient.       New Prescriptions    HYDROCODONE-ACETAMINOPHEN (NORCO) 5-325 MG PER TABLET    Take 1 tablet by mouth every 6 hours as needed for moderate to severe pain       Final diagnoses:   Acute left-sided low back pain without sciatica       2/11/2018   [facility] AND \[facility] [place] [facility]\""Kristopher MD  02/11/18 9601

## 2018-02-12 NOTE — TELEPHONE ENCOUNTER
Patient Information     Patient Name MRN Sex Bea Fortune [id number] Female 1935      Telephone Encounter by Jenni Rivera RN at 2018  8:04 AM     Author:  Jenni Rivera RN Service:  (none) Author Type:  NURS- Registered Nurse     Filed:  2018  8:13 AM Encounter Date:  2018 Status:  Signed     :  Jenni Rivera RN (NURS- Registered Nurse)            Calcium Channel Blockers    Office visit in the past 12 months or per provider note.    Last visit with AJITH HOWE was on: 2017 in [facility]  Next visit with AJITH HOWE is on: No future appointment listed with this provider  Next visit with Internal Medicine is on: No future appointment listed in this department  BP Readings from Last 4 Encounters:    17 128/74   17 152/61   17 174/80   17 (!) 170/102       Review last provider visit note.  If BP reviewed and plan is noted, can refill.  Max refill for 12 months from last office visit or per provider note. Letter sent to remind patient that annual appointment is due soon for further refills.   Unable to complete prescription refill per RN Medication Refill Policy.................... Jenni Rivera RN ....................  2018   8:05 AM

## 2018-02-15 ENCOUNTER — DOCUMENTATION ONLY (OUTPATIENT)
Dept: FAMILY MEDICINE | Facility: OTHER | Age: 83
End: 2018-02-15

## 2018-02-15 RX ORDER — TRAMADOL HYDROCHLORIDE 50 MG/1
50 TABLET ORAL EVERY 6 HOURS PRN
COMMUNITY
Start: 2017-01-21 | End: 2018-02-20

## 2018-02-15 RX ORDER — METOPROLOL SUCCINATE 200 MG/1
200 TABLET, EXTENDED RELEASE ORAL DAILY
COMMUNITY
Start: 2017-09-15 | End: 2018-02-20

## 2018-02-15 RX ORDER — ASPIRIN 325 MG
325 TABLET ORAL
COMMUNITY
End: 2021-08-18

## 2018-02-15 RX ORDER — HYDROCHLOROTHIAZIDE 25 MG/1
25 TABLET ORAL DAILY
COMMUNITY
Start: 2017-01-12 | End: 2018-03-14

## 2018-02-15 RX ORDER — ACETAMINOPHEN 500 MG
1000 TABLET ORAL EVERY 6 HOURS PRN
COMMUNITY

## 2018-02-15 RX ORDER — ZOLPIDEM TARTRATE 5 MG/1
5 TABLET ORAL AT BEDTIME
COMMUNITY
Start: 2017-11-29 | End: 2018-03-12

## 2018-02-15 RX ORDER — ATENOLOL 100 MG/1
100 TABLET ORAL DAILY
COMMUNITY
Start: 2017-10-25 | End: 2018-07-19

## 2018-02-15 RX ORDER — LEVOTHYROXINE SODIUM 100 UG/1
100 TABLET ORAL
COMMUNITY
Start: 2017-01-12 | End: 2018-02-28

## 2018-02-15 RX ORDER — SIMVASTATIN 40 MG
40 TABLET ORAL AT BEDTIME
COMMUNITY
Start: 2016-02-15 | End: 2018-03-12

## 2018-02-15 RX ORDER — CITALOPRAM HYDROBROMIDE 20 MG/1
20 TABLET ORAL DAILY
COMMUNITY
Start: 2016-02-15 | End: 2018-02-20

## 2018-02-15 RX ORDER — LYSINE HCL 500 MG
1 TABLET ORAL
COMMUNITY
Start: 2013-03-12

## 2018-02-15 RX ORDER — AMLODIPINE BESYLATE 5 MG/1
5 TABLET ORAL DAILY
COMMUNITY
Start: 2018-01-04 | End: 2018-03-12

## 2018-02-20 ENCOUNTER — TELEPHONE (OUTPATIENT)
Dept: INTERNAL MEDICINE | Facility: OTHER | Age: 83
End: 2018-02-20

## 2018-02-20 ENCOUNTER — OFFICE VISIT (OUTPATIENT)
Dept: INTERNAL MEDICINE | Facility: OTHER | Age: 83
End: 2018-02-20
Attending: INTERNAL MEDICINE
Payer: COMMERCIAL

## 2018-02-20 VITALS
WEIGHT: 119 LBS | HEIGHT: 65 IN | BODY MASS INDEX: 19.83 KG/M2 | DIASTOLIC BLOOD PRESSURE: 64 MMHG | SYSTOLIC BLOOD PRESSURE: 132 MMHG | HEART RATE: 64 BPM

## 2018-02-20 DIAGNOSIS — M54.42 ACUTE LEFT-SIDED LOW BACK PAIN WITH LEFT-SIDED SCIATICA: Primary | ICD-10-CM

## 2018-02-20 PROCEDURE — 99213 OFFICE O/P EST LOW 20 MIN: CPT | Performed by: INTERNAL MEDICINE

## 2018-02-20 PROCEDURE — G0463 HOSPITAL OUTPT CLINIC VISIT: HCPCS

## 2018-02-20 RX ORDER — METHYLPREDNISOLONE 4 MG
8 TABLET, DOSE PACK ORAL SEE ADMIN INSTRUCTIONS
Qty: 21 TABLET | Refills: 0 | Status: SHIPPED | OUTPATIENT
Start: 2018-02-20 | End: 2018-02-20

## 2018-02-20 RX ORDER — TRAMADOL HYDROCHLORIDE 50 MG/1
50 TABLET ORAL EVERY 6 HOURS PRN
Qty: 30 TABLET | Refills: 1 | Status: SHIPPED | OUTPATIENT
Start: 2018-02-20 | End: 2018-09-18

## 2018-02-20 RX ORDER — METHYLPREDNISOLONE 4 MG
TABLET, DOSE PACK ORAL
Qty: 21 TABLET | Refills: 0 | Status: SHIPPED | OUTPATIENT
Start: 2018-02-20 | End: 2018-03-12

## 2018-02-20 ASSESSMENT — PAIN SCALES - GENERAL: PAINLEVEL: WORST PAIN (10)

## 2018-02-20 ASSESSMENT — ANXIETY QUESTIONNAIRES
3. WORRYING TOO MUCH ABOUT DIFFERENT THINGS: NOT AT ALL
GAD7 TOTAL SCORE: 0
6. BECOMING EASILY ANNOYED OR IRRITABLE: NOT AT ALL
7. FEELING AFRAID AS IF SOMETHING AWFUL MIGHT HAPPEN: NOT AT ALL
2. NOT BEING ABLE TO STOP OR CONTROL WORRYING: NOT AT ALL
1. FEELING NERVOUS, ANXIOUS, OR ON EDGE: NOT AT ALL
IF YOU CHECKED OFF ANY PROBLEMS ON THIS QUESTIONNAIRE, HOW DIFFICULT HAVE THESE PROBLEMS MADE IT FOR YOU TO DO YOUR WORK, TAKE CARE OF THINGS AT HOME, OR GET ALONG WITH OTHER PEOPLE: NOT DIFFICULT AT ALL
5. BEING SO RESTLESS THAT IT IS HARD TO SIT STILL: NOT AT ALL

## 2018-02-20 ASSESSMENT — ENCOUNTER SYMPTOMS
ALLERGIC/IMMUNOLOGIC NEGATIVE: 1
CONSTITUTIONAL NEGATIVE: 1
EYES NEGATIVE: 1
ENDOCRINE NEGATIVE: 1

## 2018-02-20 ASSESSMENT — PATIENT HEALTH QUESTIONNAIRE - PHQ9: 5. POOR APPETITE OR OVEREATING: NOT AT ALL

## 2018-02-20 NOTE — MR AVS SNAPSHOT
"              After Visit Summary   2018    Bea Flores    MRN: [id number]           Patient Information     Date Of Birth          1935        Visit Information        Provider Department      2018 12:40 PM Evan Giles MD [facility]        Today's Diagnoses     Acute left-sided low back pain with left-sided sciatica    -  1       Follow-ups after your visit        Who to contact     If you have questions or need follow up information about today's clinic visit or your schedule please contact [facility] directly at [phone].  Normal or non-critical lab and imaging results will be communicated to you by Decisyonhart, letter or phone within 4 business days after the clinic has received the results. If you do not hear from us within 7 days, please contact the clinic through Allihubt or phone. If you have a critical or abnormal lab result, we will notify you by phone as soon as possible.  Submit refill requests through [email or web address] or call your pharmacy and they will forward the refill request to us. Please allow 3 business days for your refill to be completed.          Additional Information About Your Visit        MyChart Information     [email or web address] lets you send messages to your doctor, view your test results, renew your prescriptions, schedule appointments and more. To sign up, go to [email or web address] . Click on \"Log in\" on the left side of the screen, which will take you to the Welcome page. Then click on \"Sign up Now\" on the right side of the page.     You will be asked to enter the access code listed below, as well as some personal information. Please follow the directions to create your username and password.     Your access code is: [email or web address]  Expires: 2018 10:42 AM     Your access code will  in 90 days. If you need help or a new code, please call your [facility] clinic or [phone].        Care EveryWhere ID     This is your Care " "EveryWhere ID. This could be used by other organizations to access your [facility] medical records  [id number]        Your Vitals Were     Pulse Height BMI (Body Mass Index)             64 5' 5\" (1.651 m) 19.8 kg/m2          Blood Pressure from Last 3 Encounters:   02/20/18 132/64   02/11/18 (!) 167/93   01/30/17 128/74    Weight from Last 3 Encounters:   02/20/18 119 lb (54 kg)   02/11/18 125 lb (56.7 kg)   01/30/17 120 lb (54.4 kg)              Today, you had the following     No orders found for display         Today's Medication Changes          These changes are accurate as of 2/20/18  1:15 PM.  If you have any questions, ask your nurse or doctor.               Start taking these medicines.        Dose/Directions    methylPREDNISolone 4 MG tablet   Commonly known as:  MEDROL   Used for:  Acute left-sided low back pain with left-sided sciatica   Started by:  Evan Giles MD        Dose:  8 mg   Take 2 tablets (8 mg) by mouth See Admin Instructions follow package directions   Quantity:  21 tablet   Refills:  0         These medicines have changed or have updated prescriptions.        Dose/Directions    amLODIPine 5 MG tablet   Commonly known as:  NORVASC   This may have changed:  Another medication with the same name was removed. Continue taking this medication, and follow the directions you see here.   Changed by:  Evan Giles MD        Dose:  5 mg   Take 5 mg by mouth daily   Refills:  0       atenolol 100 MG tablet   Commonly known as:  TENORMIN   This may have changed:  Another medication with the same name was removed. Continue taking this medication, and follow the directions you see here.   Changed by:  Evan Giles MD        Dose:  100 mg   Take 100 mg by mouth daily   Refills:  0       hydrochlorothiazide 25 MG tablet   Commonly known as:  HYDRODIURIL   This may have changed:  Another medication with the same name was removed. Continue taking this medication, and follow the directions you see " here.   Changed by:  Evan Giles MD        Dose:  25 mg   Take 25 mg by mouth daily   Refills:  0       levothyroxine 100 MCG tablet   Commonly known as:  SYNTHROID/LEVOTHROID   This may have changed:  Another medication with the same name was removed. Continue taking this medication, and follow the directions you see here.   Changed by:  Evan Giles MD        Dose:  100 mcg   Take 100 mcg by mouth every morning (before breakfast)   Refills:  0       simvastatin 40 MG tablet   Commonly known as:  ZOCOR   This may have changed:  Another medication with the same name was removed. Continue taking this medication, and follow the directions you see here.   Changed by:  Evan Giles MD        Dose:  40 mg   Take 40 mg by mouth At Bedtime   Refills:  0       zolpidem 5 MG tablet   Commonly known as:  AMBIEN   This may have changed:  Another medication with the same name was removed. Continue taking this medication, and follow the directions you see here.   Changed by:  Evan Giles MD        Dose:  5 mg   Take 5 mg by mouth At Bedtime   Refills:  0         Stop taking these medicines if you haven't already. Please contact your care team if you have questions.     aspirin - buffered 325 MG Tabs tablet   Commonly known as:  ASCRIPTIN   Stopped by:  Evan Giles MD           citalopram 20 MG tablet   Commonly known as:  celeXA   Stopped by:  Evan Giles MD           HYDROcodone-acetaminophen 5-325 MG per tablet   Commonly known as:  NORCO   Stopped by:  Evan Giles MD           latanoprost 0.005 % ophthalmic solution   Commonly known as:  XALATAN   Stopped by:  Evan Giles MD           metoprolol succinate 200 MG 24 hr tablet   Commonly known as:  TOPROL-XL   Stopped by:  Evan Giles MD                Where to get your medicines      These medications were sent to [facility] [address] [address]     Phone:   [phone]     methylPREDNISolone 4 MG tablet         Some of these will need a paper prescription and others can be bought over the counter.  Ask your nurse if you have questions.     Bring a paper prescription for each of these medications     traMADol 50 MG tablet                Primary Care Provider Office Phone # Fax #    Evan Giles [phone]       [address]    [facility] [address]        Equal Access to Services     [facility] : Hadii aad ku hadasho Soomaali, waaxda luqadaha, qaybta kaalmada adeegyada, waxay waqasin hayprashantn ademiguel ángel chaudharyaprilmichelle bradshawprashantarslan . So [facility] [phone].    ATENCIÓN: Si debo ashley, tiene a noel disposición servicios gratuitos de asistencia lingüística. Llame al [phone].    We comply with applicable federal civil rights laws and [place] laws. We do not discriminate on the basis of race, color, national origin, age, disability, sex, sexual orientation, or gender identity.            Thank you!     Thank you for choosing [facility] AND [facility]  for your care. Our goal is always to provide you with excellent care. Hearing back from our patients is one way we can continue to improve our services. Please take a few minutes to complete the written survey that you may receive in the mail after your visit with us. Thank you!             Your Updated Medication List - Protect others around you: Learn how to safely use, store and throw away your medicines at www.disposemymeds.org.          This list is accurate as of 2/20/18  1:15 PM.  Always use your most recent med list.                   Brand Name Dispense Instructions for use Diagnosis    acetaminophen 500 MG tablet    TYLENOL     Take 1,000 mg by mouth every 6 hours as needed Max acetaminophen dose: 4000 mg in 24 hours        amLODIPine 5 MG tablet    NORVASC     Take 5 mg by mouth daily        atenolol 100 MG tablet    TENORMIN     Take 100 mg by mouth daily        CALCIUM 600+D PLUS MINERALS  600-400 MG-UNIT Tabs      Take 1 tablet by mouth daily with food        GOODSENSE ASPIRIN 325 MG tablet   Generic drug:  aspirin      Take 325 mg by mouth daily with food        hydrochlorothiazide 25 MG tablet    HYDRODIURIL     Take 25 mg by mouth daily        levothyroxine 100 MCG tablet    SYNTHROID/LEVOTHROID     Take 100 mcg by mouth every morning (before breakfast)        methylPREDNISolone 4 MG tablet    MEDROL    21 tablet    Take 2 tablets (8 mg) by mouth See Admin Instructions follow package directions    Acute left-sided low back pain with left-sided sciatica       simvastatin 40 MG tablet    ZOCOR     Take 40 mg by mouth At Bedtime        traMADol 50 MG tablet    ULTRAM    30 tablet    Take 1 tablet (50 mg) by mouth every 6 hours as needed for pain    Acute left-sided low back pain with left-sided sciatica       zolpidem 5 MG tablet    AMBIEN     Take 5 mg by mouth At Bedtime

## 2018-02-20 NOTE — NURSING NOTE
The patient is here today with complaints of lower back pain and left hip pain.  ЕЛЕНА STOVALL LPN 2/20/2018 12:36 PM

## 2018-02-20 NOTE — TELEPHONE ENCOUNTER
Contacted [facility] pharmacy they needed to know if the medrol prescription is for a dosing pack. After asking Evan Giles MD this is suppose dosing pack according to the prescription. [facility] pharmacy was contacted and given this information.  ЕЛЕНА STOVALL LPN 2/20/2018 2:34 PM

## 2018-02-20 NOTE — PROGRESS NOTES
Chief Complaint:  Hip pain.    HPI: She is in today with a complaint of left hip pain and left leg pain.  This started last week after she was doing some shoveling.  The pain is in the low back and radiates all the way down her leg down to the calf.  Her calf actually feels tight.  She is not having any bowel or bladder symptoms.  She went to the emergency room and was given some opioid pain medications but she does not like the thought of taking them.  She has been using the tramadol with some relief.  She talked to a nurse who suggested that she have an x-ray because of her age.  She had no injury at any time.    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     EXTRACAPSULAR CATARACT EXTRATION WITH INTRAOCULAR LENS IMPLANT      No Comments Provided     TONSILLECTOMY, ADENOIDECTOMY, COMBINED      No Comments Provided       Allergies   Allergen Reactions     Trazodone Shortness Of Breath and Unknown     Codeine Unknown     Lisinopril Cough and Unknown     Pneumococcal 13-Bertha Conj Vacc Unknown       Current Outpatient Prescriptions   Medication Sig Dispense Refill     methylPREDNISolone (MEDROL) 4 MG tablet Take 2 tablets (8 mg) by mouth See Admin Instructions follow package directions 21 tablet 0     traMADol (ULTRAM) 50 MG tablet Take  1 tablet (50 mg) by mouth every 6 hours as needed for pain 30 tablet 1     acetaminophen (TYLENOL) 500 MG tablet Take 1,000 mg by mouth every 6 hours as needed Max acetaminophen dose: 4000 mg in 24 hours       aspirin (GOODSENSE ASPIRIN) 325 MG tablet Take 325 mg by mouth daily with food       Calcium Carbonate-Vit D-Min (CALCIUM 600+D PLUS MINERALS) 600-400 MG-UNIT TABS Take 1 tablet by mouth daily with food       amLODIPine (NORVASC) 5 MG tablet Take 5 mg by mouth daily       atenolol (TENORMIN) 100 MG tablet Take 100 mg by mouth daily       hydrochlorothiazide (HYDRODIURIL) 25 MG tablet Take 25 mg by mouth daily       levothyroxine (SYNTHROID/LEVOTHROID) 100 MCG tablet Take 100 mcg by mouth every morning (before breakfast)       simvastatin (ZOCOR) 40 MG tablet Take 40 mg by mouth At Bedtime       zolpidem (AMBIEN) 5 MG tablet Take 5 mg by mouth At Bedtime       [DISCONTINUED] ATENOLOL PO Take 100 mg by mouth daily       [DISCONTINUED] AmLODIPine Besylate (NORVASC PO) Take 5 mg by mouth daily       [DISCONTINUED] hydrochlorothiazide (MICROZIDE) 12.5 MG capsule Take 25 mg by mouth daily       [DISCONTINUED] LEVOTHYROXINE SODIUM PO Take 100 mcg by mouth       [DISCONTINUED] SIMVASTATIN PO Take 40 mg by mouth At Bedtime         Review of Systems   Constitutional: Negative.    Eyes: Negative.    Endocrine: Negative.    Allergic/Immunologic: Negative.        Physical Exam   Constitutional: She is oriented to person, place, and time. She appears well-developed. No distress.   Neurological: She is alert and oriented to person, place, and time.   Reflex Scores:       Patellar reflexes are 2+ on the right side and 0 on the left side.       Achilles reflexes are 1+ on the right side and 1+ on the left side.  Skin: She is not diaphoretic.   Nursing note and vitals reviewed.      Assessment:        ICD-10-CM    1. Acute left-sided low back pain with left-sided sciatica M54.42 methylPREDNISolone (MEDROL) 4 MG tablet      traMADol (ULTRAM) 50 MG tablet       Plan: Reviewed with the patient.  I think that we should proceed with conservative treatment at first.  She is put on Medrol Dosepak and I also recommended that she continue using the tramadol rather than the opioid that she received from the emergency room.  If she is not better next week, she will call and I will order an MRI.  Consider physical therapy as well depending on her progress.

## 2018-02-21 ASSESSMENT — PATIENT HEALTH QUESTIONNAIRE - PHQ9: SUM OF ALL RESPONSES TO PHQ QUESTIONS 1-9: 0

## 2018-02-21 ASSESSMENT — ANXIETY QUESTIONNAIRES: GAD7 TOTAL SCORE: 0

## 2018-02-27 ENCOUNTER — TELEPHONE (OUTPATIENT)
Dept: INTERNAL MEDICINE | Facility: OTHER | Age: 83
End: 2018-02-27

## 2018-02-27 DIAGNOSIS — M54.42 ACUTE LEFT-SIDED LOW BACK PAIN WITH LEFT-SIDED SCIATICA: Primary | ICD-10-CM

## 2018-02-27 NOTE — TELEPHONE ENCOUNTER
Contacted the patient and gave her the information below.   ЕЕЛНА STOVALL LPN 2/27/2018 3:20 PM

## 2018-02-27 NOTE — TELEPHONE ENCOUNTER
Contacted the patient she stated she was suppose to call back and let Evan Giles MD know if she is not feeling at least 50 percent better. She reports she is not feeling better and still taking the pain medication. She is not sure if Evan Giles MD will want to do a MRI or not and wanted to update him on her condition.  ЕЛЕНА STOVALL LPN 2/27/2018 2:41 PM

## 2018-02-28 DIAGNOSIS — E03.9 HYPOTHYROIDISM: Primary | ICD-10-CM

## 2018-02-28 NOTE — LETTER
March 2, 2018      Bea Flores  [address]  [place] [address]        Dear Ms. Flores,    Your pharmacy has requested a refill of Synthroid.  This medication request is being addressed.     According to our records, you are overdue for annual medication management and labs. Your health is very important to us. Please contact our scheduling line at [phone] to set up this appointment at your earliest convenience, and before your next medication refills are needed.     Thank you for choosing [facility] for your health care needs.     Sincerely,        The Refill Nurse  [facility]

## 2018-03-01 ENCOUNTER — HOSPITAL ENCOUNTER (OUTPATIENT)
Dept: MRI IMAGING | Facility: OTHER | Age: 83
Discharge: HOME | End: 2018-03-01
Attending: INTERNAL MEDICINE | Admitting: INTERNAL MEDICINE
Payer: MEDICARE

## 2018-03-01 DIAGNOSIS — M54.42 ACUTE LEFT-SIDED LOW BACK PAIN WITH LEFT-SIDED SCIATICA: ICD-10-CM

## 2018-03-01 DIAGNOSIS — M47.26 OSTEOARTHRITIS OF SPINE WITH RADICULOPATHY, LUMBAR REGION: Primary | ICD-10-CM

## 2018-03-01 PROCEDURE — 72148 MRI LUMBAR SPINE W/O DYE: CPT

## 2018-03-02 RX ORDER — LEVOTHYROXINE SODIUM 100 UG/1
100 TABLET ORAL
Qty: 30 TABLET | Refills: 2 | Status: SHIPPED | OUTPATIENT
Start: 2018-03-02 | End: 2018-06-05

## 2018-03-02 NOTE — TELEPHONE ENCOUNTER
Prescription approved per [facility] Refill Protocol.  Patient is now due for annual medication management and labs. Patient has appointment set up for 3-6-18 with Evan Giles RN on 3/2/2018 at 3:05 PM

## 2018-03-12 ENCOUNTER — OFFICE VISIT (OUTPATIENT)
Dept: INTERNAL MEDICINE | Facility: OTHER | Age: 83
End: 2018-03-12
Attending: INTERNAL MEDICINE
Payer: MEDICARE

## 2018-03-12 VITALS
WEIGHT: 119.2 LBS | DIASTOLIC BLOOD PRESSURE: 75 MMHG | HEART RATE: 66 BPM | HEIGHT: 65 IN | SYSTOLIC BLOOD PRESSURE: 135 MMHG | BODY MASS INDEX: 19.86 KG/M2

## 2018-03-12 DIAGNOSIS — E78.5 HYPERLIPIDEMIA, UNSPECIFIED HYPERLIPIDEMIA TYPE: ICD-10-CM

## 2018-03-12 DIAGNOSIS — Z12.31 ENCOUNTER FOR SCREENING MAMMOGRAM FOR BREAST CANCER: ICD-10-CM

## 2018-03-12 DIAGNOSIS — I10 ESSENTIAL HYPERTENSION: ICD-10-CM

## 2018-03-12 DIAGNOSIS — M47.26 OSTEOARTHRITIS OF SPINE WITH RADICULOPATHY, LUMBAR REGION: Primary | ICD-10-CM

## 2018-03-12 DIAGNOSIS — E03.9 HYPOTHYROIDISM, UNSPECIFIED TYPE: ICD-10-CM

## 2018-03-12 DIAGNOSIS — F51.01 PRIMARY INSOMNIA: ICD-10-CM

## 2018-03-12 LAB
ALBUMIN SERPL-MCNC: 4.5 G/DL (ref 3.5–5.7)
ALP SERPL-CCNC: 19 U/L (ref 34–104)
ALT SERPL W P-5'-P-CCNC: 25 U/L (ref 7–52)
ANION GAP SERPL CALCULATED.3IONS-SCNC: 7 MMOL/L (ref 3–14)
AST SERPL W P-5'-P-CCNC: 22 U/L (ref 13–39)
BILIRUB SERPL-MCNC: 0.7 MG/DL (ref 0.3–1)
BUN SERPL-MCNC: 19 MG/DL (ref 7–25)
CALCIUM SERPL-MCNC: 9.9 MG/DL (ref 8.6–10.3)
CHLORIDE SERPL-SCNC: 99 MMOL/L (ref 98–107)
CHOLEST SERPL-MCNC: 162 MG/DL
CO2 SERPL-SCNC: 33 MMOL/L (ref 21–31)
CREAT SERPL-MCNC: 0.78 MG/DL (ref 0.6–1.2)
ERYTHROCYTE [DISTWIDTH] IN BLOOD BY AUTOMATED COUNT: 13.2 % (ref 10–15)
GFR SERPL CREATININE-BSD FRML MDRD: 71 ML/MIN/1.7M2
GLUCOSE SERPL-MCNC: 105 MG/DL (ref 70–105)
HCT VFR BLD AUTO: 41.2 % (ref 35–47)
HDLC SERPL-MCNC: 44 MG/DL (ref 23–92)
HGB BLD-MCNC: 14.1 G/DL (ref 11.7–15.7)
LDLC SERPL CALC-MCNC: 72 MG/DL
MCH RBC QN AUTO: 32.5 PG (ref 26.5–33)
MCHC RBC AUTO-ENTMCNC: 34.2 G/DL (ref 31.5–36.5)
MCV RBC AUTO: 95 FL (ref 78–100)
NONHDLC SERPL-MCNC: 118 MG/DL
PLATELET # BLD AUTO: 171 10E9/L (ref 150–450)
POTASSIUM SERPL-SCNC: 4.1 MMOL/L (ref 3.5–5.1)
PROT SERPL-MCNC: 7.4 G/DL (ref 6.4–8.9)
RBC # BLD AUTO: 4.34 10E12/L (ref 3.8–5.2)
SODIUM SERPL-SCNC: 139 MMOL/L (ref 134–144)
T4 FREE SERPL-MCNC: 0.94 NG/DL (ref 0.6–1.6)
TRIGL SERPL-MCNC: 231 MG/DL
TSH SERPL DL<=0.05 MIU/L-ACNC: 6.74 IU/ML (ref 0.34–5.6)
WBC # BLD AUTO: 5.3 10E9/L (ref 4–11)

## 2018-03-12 PROCEDURE — 80061 LIPID PANEL: CPT | Performed by: INTERNAL MEDICINE

## 2018-03-12 PROCEDURE — 84443 ASSAY THYROID STIM HORMONE: CPT | Performed by: INTERNAL MEDICINE

## 2018-03-12 PROCEDURE — 85027 COMPLETE CBC AUTOMATED: CPT | Performed by: INTERNAL MEDICINE

## 2018-03-12 PROCEDURE — 84439 ASSAY OF FREE THYROXINE: CPT | Performed by: INTERNAL MEDICINE

## 2018-03-12 PROCEDURE — G0463 HOSPITAL OUTPT CLINIC VISIT: HCPCS

## 2018-03-12 PROCEDURE — 99215 OFFICE O/P EST HI 40 MIN: CPT | Performed by: INTERNAL MEDICINE

## 2018-03-12 PROCEDURE — 80053 COMPREHEN METABOLIC PANEL: CPT | Performed by: INTERNAL MEDICINE

## 2018-03-12 PROCEDURE — 36415 COLL VENOUS BLD VENIPUNCTURE: CPT | Performed by: INTERNAL MEDICINE

## 2018-03-12 RX ORDER — AMLODIPINE BESYLATE 5 MG/1
5 TABLET ORAL DAILY
Qty: 90 TABLET | Refills: 3 | Status: SHIPPED | OUTPATIENT
Start: 2018-03-12 | End: 2019-04-06

## 2018-03-12 RX ORDER — SIMVASTATIN 40 MG
40 TABLET ORAL AT BEDTIME
Qty: 90 TABLET | Refills: 3 | Status: SHIPPED | OUTPATIENT
Start: 2018-03-12 | End: 2019-04-18

## 2018-03-12 RX ORDER — ZOLPIDEM TARTRATE 5 MG/1
5 TABLET ORAL AT BEDTIME
Qty: 30 TABLET | Refills: 3 | Status: SHIPPED | OUTPATIENT
Start: 2018-03-12 | End: 2018-07-05

## 2018-03-12 ASSESSMENT — ENCOUNTER SYMPTOMS
SINUS PRESSURE: 0
JOINT SWELLING: 0
CHILLS: 0
SLEEP DISTURBANCE: 0
SHORTNESS OF BREATH: 0
TROUBLE SWALLOWING: 0
HEADACHES: 0
ABDOMINAL PAIN: 0
DIZZINESS: 0
FEVER: 0
DIAPHORESIS: 0
FREQUENCY: 0
RHINORRHEA: 0
FATIGUE: 0
EYE REDNESS: 0
DIARRHEA: 0
WHEEZING: 0
DIFFICULTY URINATING: 0
SORE THROAT: 0
VOMITING: 0
NAUSEA: 0
BRUISES/BLEEDS EASILY: 0
COUGH: 0
PALPITATIONS: 0
BACK PAIN: 0
NECK STIFFNESS: 0
NUMBNESS: 0
NECK PAIN: 0
SEIZURES: 0
CHEST TIGHTNESS: 0
CONSTIPATION: 0
AGITATION: 0
CONFUSION: 0
WEAKNESS: 0
HALLUCINATIONS: 0
TREMORS: 0
FACIAL SWELLING: 0
BLOOD IN STOOL: 0
EYE PAIN: 0
SINUS PAIN: 0
DYSURIA: 0
ADENOPATHY: 0
HEMATURIA: 0
FLANK PAIN: 0
ABDOMINAL DISTENTION: 0

## 2018-03-12 ASSESSMENT — ANXIETY QUESTIONNAIRES
3. WORRYING TOO MUCH ABOUT DIFFERENT THINGS: NOT AT ALL
7. FEELING AFRAID AS IF SOMETHING AWFUL MIGHT HAPPEN: NOT AT ALL
5. BEING SO RESTLESS THAT IT IS HARD TO SIT STILL: NOT AT ALL
IF YOU CHECKED OFF ANY PROBLEMS ON THIS QUESTIONNAIRE, HOW DIFFICULT HAVE THESE PROBLEMS MADE IT FOR YOU TO DO YOUR WORK, TAKE CARE OF THINGS AT HOME, OR GET ALONG WITH OTHER PEOPLE: NOT DIFFICULT AT ALL
GAD7 TOTAL SCORE: 0
1. FEELING NERVOUS, ANXIOUS, OR ON EDGE: NOT AT ALL
6. BECOMING EASILY ANNOYED OR IRRITABLE: NOT AT ALL
2. NOT BEING ABLE TO STOP OR CONTROL WORRYING: NOT AT ALL

## 2018-03-12 ASSESSMENT — PAIN SCALES - GENERAL: PAINLEVEL: MILD PAIN (2)

## 2018-03-12 ASSESSMENT — PATIENT HEALTH QUESTIONNAIRE - PHQ9: 5. POOR APPETITE OR OVEREATING: NOT AT ALL

## 2018-03-12 NOTE — MR AVS SNAPSHOT
"              After Visit Summary   3/12/2018    Bea Flores    MRN: [id number]           Patient Information     Date Of Birth          1935        Visit Information        Provider Department      3/12/2018 10:20 AM Evan Giles MD [facility]        Today's Diagnoses     Osteoarthritis of spine with radiculopathy, lumbar region    -  1    Primary insomnia        Hyperlipidemia, unspecified hyperlipidemia type        Essential hypertension        Hypothyroidism, unspecified type        Encounter for screening mammogram for breast cancer           Follow-ups after your visit        Future tests that were ordered for you today     Open Future Orders        Priority Expected Expires Ordered    MA Screening Digital Bilateral Routine  3/12/2019 3/12/2018            Who to contact     If you have questions or need follow up information about today's clinic visit or your schedule please contact [facility] directly at [phone].  Normal or non-critical lab and imaging results will be communicated to you by [employer], letter or phone within 4 business days after the clinic has received the results. If you do not hear from us within 7 days, please contact the clinic through [employer] or phone. If you have a critical or abnormal lab result, we will notify you by phone as soon as possible.  Submit refill requests through [employer] or call your pharmacy and they will forward the refill request to us. Please allow 3 business days for your refill to be completed.          Additional Information About Your Visit        [employer] Information     [employer] lets you send messages to your doctor, view your test results, renew your prescriptions, schedule appointments and more. To sign up, go to [email or web address] . Click on \"Log in\" on the left side of the screen, which will take you to the Welcome page. Then click on \"Sign up Now\" on the right side of the page.     You will be " "asked to enter the access code listed below, as well as some personal information. Please follow the directions to create your username and password.     Your access code is: [email or web address]  Expires: 2018 11:42 AM     Your access code will  in 90 days. If you need help or a new code, please call your [facility] clinic or [phone].        Care EveryWhere ID     This is your Care EveryWhere ID. This could be used by other organizations to access your [facility] medical records  [id number]        Your Vitals Were     Pulse Height BMI (Body Mass Index)             66 5' 5.25\" (1.657 m) 19.68 kg/m2          Blood Pressure from Last 3 Encounters:   18 135/75   18 132/64   18 (!) 167/93    Weight from Last 3 Encounters:   18 119 lb 3.2 oz (54.1 kg)   18 119 lb (54 kg)   18 125 lb (56.7 kg)              We Performed the Following     CBC with platelets     Comprehensive metabolic panel (BMP + Alb, Alk Phos, ALT, AST, Total. Bili, TP)     Lipid Profile (Chol, Trig, HDL, LDL calc) - FASTING     T4, free     TSH GH          Where to get your medicines      These medications were sent to [facility] [address] [address]     Phone:  [phone]     amLODIPine 5 MG tablet    simvastatin 40 MG tablet         Some of these will need a paper prescription and others can be bought over the counter.  Ask your nurse if you have questions.     Bring a paper prescription for each of these medications     zolpidem 5 MG tablet          Primary Care Provider Office Phone # Fax #    Evan Giles [phone]       [address] [facility] [address]        Equal Access to Services     FADY LEZAMA : Jose R Ramírez, marylou willoughby, qaybta kaalchristophe holland. [facility] [phone].    ATENCIÓN: Si debo ashley, tiene a noel disposición " servicios gratuitos de asistencia lingüística. Jacqueline bunch [phone].    We comply with applicable federal civil rights laws and [place] laws. We do not discriminate on the basis of race, color, national origin, age, disability, sex, sexual orientation, or gender identity.            Thank you!     Thank you for choosing [facility]  for your care. Our goal is always to provide you with excellent care. Hearing back from our patients is one way we can continue to improve our services. Please take a few minutes to complete the written survey that you may receive in the mail after your visit with us. Thank you!             Your Updated Medication List - Protect others around you: Learn how to safely use, store and throw away your medicines at www.disposemymeds.org.          This list is accurate as of 3/12/18 10:57 AM.  Always use your most recent med list.                   Brand Name Dispense Instructions for use Diagnosis    acetaminophen 500 MG tablet    TYLENOL     Take 1,000 mg by mouth every 6 hours as needed Max acetaminophen dose: 4000 mg in 24 hours        amLODIPine 5 MG tablet    NORVASC    90 tablet    Take 1 tablet (5 mg) by mouth daily    Essential hypertension       atenolol 100 MG tablet    TENORMIN     Take 100 mg by mouth daily        CALCIUM 600+D PLUS MINERALS 600-400 MG-UNIT Tabs      Take 1 tablet by mouth daily with food        GOODSENSE ASPIRIN 325 MG tablet   Generic drug:  aspirin      Take 325 mg by mouth daily with food        hydrochlorothiazide 25 MG tablet    HYDRODIURIL     Take 25 mg by mouth daily        levothyroxine 100 MCG tablet    SYNTHROID/LEVOTHROID    30 tablet    Take 1 tablet (100 mcg) by mouth every morning (before breakfast)    Hypothyroidism       simvastatin 40 MG tablet    ZOCOR    90 tablet    Take 1 tablet (40 mg) by mouth At Bedtime    Hyperlipidemia, unspecified hyperlipidemia type       traMADol 50 MG tablet    ULTRAM    30 tablet    Take 1  tablet (50 mg) by mouth every 6 hours as needed for pain    Acute left-sided low back pain with left-sided sciatica       zolpidem 5 MG tablet    AMBIEN    30 tablet    Take 1 tablet (5 mg) by mouth At Bedtime    Primary insomnia

## 2018-03-12 NOTE — NURSING NOTE
Patient is here for physical, has forms/papers to be signed. Patient is needing refills of medication.  Sydnie Gandhi LPN .............3/12/619822:11 AM

## 2018-03-12 NOTE — PROGRESS NOTES
Chief Complaint:  This patient is here for a comprehensive review of their multiplemedical problems and review of medications, renewal of medications and update on necessary health maintenance issues.      HPI: This patient comes in today for complete evaluation.  In most respects she is feeling well.  She has treated hypertension.  She is on multidrug therapy for this.  She tests her blood pressure at home and her blood pressure is always excellent.  We did verify last year that her cough was accurate.  She does not have any endorgan disease as a result of her hypertension.    She also has treated hypothyroidism.  She does not have any symptoms of under or over replacement.  She has treated hyperlipidemia.  She does not have any known vascular disease as a result of this.    Her biggest problem lately has been her low back.  It is actually getting better.  She is doing some therapy.  Her therapist told her to only work 4 hours a day.  She normally just works 2 days weekly anyway.  That certainly seems reasonable until things improve.  She is taking the tramadol very infrequently and she tells me that overall it seems as though her symptoms are markedly improved.    Medications are reconciled.  Past medical history, past surgical history, family history and social history are all reviewed and updated.    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     Essential (primary) hypertension     No Comments Provided     Hyperlipidemia     No Comments Provided     Hypothyroidism     No Comments Provided     Migraine with aura and without status migrainosus, not intractable     No Comments Provided     Postmenopausal atrophic vaginitis     No Comments Provided     Sciatica of right side     Intermittent right sciatica     Transient global amnesia     2/27/2014     Varicose veins of other specified sites (CODE)     No Comments Provided       Past Surgical History:    Procedure Laterality Date     BIOPSY BREAST Bilateral      COLONOSCOPY      11/06/06,Colonoscopy, next due in 2016.     EXTRACAPSULAR CATARACT EXTRATION WITH INTRAOCULAR LENS IMPLANT Bilateral     No Comments Provided     TONSILLECTOMY, ADENOIDECTOMY, COMBINED      No Comments Provided       Current Outpatient Prescriptions   Medication Sig Dispense Refill     zolpidem (AMBIEN) 5 MG tablet Take 1 tablet (5 mg) by mouth At Bedtime 30 tablet 3     simvastatin (ZOCOR) 40 MG tablet Take 1 tablet (40 mg) by mouth At Bedtime 90 tablet 3     amLODIPine (NORVASC) 5 MG tablet Take 1 tablet (5 mg) by mouth daily 90 tablet 3     levothyroxine (SYNTHROID/LEVOTHROID) 100 MCG tablet Take 1 tablet (100 mcg) by mouth every morning (before breakfast) 30 tablet 2     traMADol (ULTRAM) 50 MG tablet Take 1 tablet (50 mg) by mouth every 6 hours as needed for pain 30 tablet 1     acetaminophen (TYLENOL) 500 MG tablet Take 1,000 mg by mouth every 6 hours as needed Max acetaminophen dose: 4000 mg in 24 hours       aspirin (GOODSENSE ASPIRIN) 325 MG tablet Take 325 mg by mouth daily with food       Calcium Carbonate-Vit D-Min (CALCIUM 600+D PLUS MINERALS) 600-400 MG-UNIT TABS Take 1 tablet by mouth daily with food       atenolol (TENORMIN) 100 MG tablet Take 100 mg by mouth daily       hydrochlorothiazide (HYDRODIURIL) 25 MG tablet Take 25 mg by mouth daily       [DISCONTINUED] amLODIPine (NORVASC) 5 MG tablet Take 5 mg by mouth daily       [DISCONTINUED] simvastatin (ZOCOR) 40 MG tablet Take 40 mg by mouth At Bedtime         Allergies   Allergen Reactions     Trazodone Shortness Of Breath and Unknown     Codeine Unknown     Lisinopril Cough and Unknown     Pneumococcal 13-Bertha Conj Vacc Unknown       Family History   Problem Relation Age of Onset     HEART DISEASE Father      Heart Disease,MI, AAA     Other - See Comments Father      Stroke     HEART DISEASE Mother      Rheumatoid Arthritis Brother      Hypertension Brother      Aneurysm  Brother      Breast Cancer Sister      Hypertension Sister      Hypertension Sister      Hypertension Sister      Type 2 Diabetes Sister      Hypertension Sister        Social History     Social History     Marital status:      Spouse name: N/A     Number of children: N/A     Years of education: N/A     Occupational History     Not on file.     Social History Main Topics     Smoking status: Former Smoker     Types: Cigarettes     Quit date: 3/12/1990     Smokeless tobacco: Never Used     Alcohol use Yes      Comment: occ wine     Drug use: No     Sexual activity: Not on file     Other Topics Concern     Not on file     Social History Narrative    .    of heart disease.  She is a retired  and travels with her daughter who lives in [place].  Presently working in the [employer] area at [employer].  Lives in town.  Two children.       Review of Systems   Constitutional: Negative for chills, diaphoresis, fatigue and fever.   HENT: Negative for congestion, ear pain, facial swelling, mouth sores, nosebleeds, rhinorrhea, sinus pain, sinus pressure, sore throat and trouble swallowing.    Eyes: Negative for pain, redness and visual disturbance.   Respiratory: Negative for cough, chest tightness, shortness of breath and wheezing.    Cardiovascular: Negative for chest pain, palpitations and leg swelling.   Gastrointestinal: Negative for abdominal distention, abdominal pain, blood in stool, constipation, diarrhea, nausea and vomiting.   Endocrine: Negative for cold intolerance and heat intolerance.   Genitourinary: Negative for difficulty urinating, dysuria, flank pain, frequency, hematuria, pelvic pain, vaginal bleeding, vaginal discharge and vaginal pain.   Musculoskeletal: Negative for back pain, gait problem, joint swelling, neck pain and neck stiffness.   Skin: Negative for rash.   Neurological: Negative for dizziness, tremors, seizures, syncope, weakness, numbness and headaches.    Hematological: Negative for adenopathy. Does not bruise/bleed easily.   Psychiatric/Behavioral: Negative for agitation, confusion, hallucinations and sleep disturbance.       Physical Exam   Constitutional: She is oriented to person, place, and time. She appears well-developed and well-nourished. No distress.   HENT:   Head: Normocephalic.   Right Ear: External ear normal.   Left Ear: External ear normal.   Mouth/Throat: Oropharynx is clear and moist. No oropharyngeal exudate.   Eyes: Conjunctivae are normal. Pupils are equal, round, and reactive to light.   Neck: Normal range of motion. Neck supple. Normal carotid pulses and no JVD present. Carotid bruit is not present. No tracheal deviation present. No thyromegaly present.   Cardiovascular: Normal rate, regular rhythm and normal heart sounds.  Exam reveals no gallop and no friction rub.    No murmur heard.  Pulmonary/Chest: Effort normal and breath sounds normal. No respiratory distress. She has no wheezes. She has no rales. Right breast exhibits no inverted nipple, no mass, no nipple discharge, no skin change and no tenderness. Left breast exhibits no inverted nipple, no mass, no nipple discharge, no skin change and no tenderness.   Abdominal: Soft. Bowel sounds are normal. She exhibits no distension and no mass. There is no tenderness. There is no rebound.   Musculoskeletal: Normal range of motion. She exhibits no edema.   Lymphadenopathy:     She has no cervical adenopathy.   Neurological: She is alert and oriented to person, place, and time. She has normal reflexes. No cranial nerve deficit. Coordination normal.   Skin: Skin is warm and dry. No rash noted. She is not diaphoretic.   Psychiatric: She has a normal mood and affect. Her behavior is normal.   Nursing note and vitals reviewed.      Assessment:      ICD-10-CM    1. Osteoarthritis of spine with radiculopathy, lumbar region M47.26 CBC with platelets   2. Primary insomnia F51.01 zolpidem (AMBIEN) 5 MG  tablet   3. Hyperlipidemia, unspecified hyperlipidemia type E78.5 Comprehensive metabolic panel (BMP + Alb, Alk Phos, ALT, AST, Total. Bili, TP)     Lipid Profile (Chol, Trig, HDL, LDL calc) - FASTING     simvastatin (ZOCOR) 40 MG tablet   4. Essential hypertension I10 amLODIPine (NORVASC) 5 MG tablet   5. Hypothyroidism, unspecified type E03.9 TSH GH     T4, free   6. Encounter for screening mammogram for breast cancer Z12.31 MA Screening Digital Bilateral        Plan: Medically speaking, she appears to be doing well at this time.  Medications are refilled without any change including her Ambien which she uses sparingly.  In regards to her low back pain, she did have some disability paperwork that was filled out for her today.  Basically she is going to be on 4 hours 2 days a week maximum until the end of this month.  She will continue with physical therapy in the interim.  If not improving after all of that, she will need to return for further evaluation.  Complete lab drawn and pending, I will send her a letter with the results.  Mammogram scheduled.  Everything else appears to be up-to-date.  Follow-up annually or sooner if there are problems.

## 2018-03-12 NOTE — LETTER
March 12, 2018      Bea Flores  [address] [place] [address]        Dear Bea Flores,    Below are the results of your recent labs:    Results for orders placed or performed in visit on 03/12/18   Comprehensive metabolic panel (BMP + Alb, Alk Phos, ALT, AST, Total. Bili, TP)   Result Value Ref Range    Sodium 139 134 - 144 mmol/L    Potassium 4.1 3.5 - 5.1 mmol/L    Chloride 99 98 - 107 mmol/L    Carbon Dioxide 33 (H) 21 - 31 mmol/L    Anion Gap 7 3 - 14 mmol/L    Glucose 105 70 - 105 mg/dL    Urea Nitrogen 19 7 - 25 mg/dL    Creatinine 0.78 0.60 - 1.20 mg/dL    GFR Estimate 71 >60 mL/min/1.7m2    GFR Estimate If Black 86 >60 mL/min/1.7m2    Calcium 9.9 8.6 - 10.3 mg/dL    Bilirubin Total 0.7 0.3 - 1.0 mg/dL    Albumin 4.5 3.5 - 5.7 g/dL    Protein Total 7.4 6.4 - 8.9 g/dL    Alkaline Phosphatase 19 (L) 34 - 104 U/L    ALT 25 7 - 52 U/L    AST 22 13 - 39 U/L   TSH GH   Result Value Ref Range    Thyrotropin 6.74 (H) 0.34 - 5.60 IU/mL   T4, free   Result Value Ref Range    T4 Free 0.94 0.60 - 1.60 ng/dL   CBC with platelets   Result Value Ref Range    WBC 5.3 4.0 - 11.0 10e9/L    RBC Count 4.34 3.8 - 5.2 10e12/L    Hemoglobin 14.1 11.7 - 15.7 g/dL    Hematocrit 41.2 35.0 - 47.0 %    MCV 95 78 - 100 fl    MCH 32.5 26.5 - 33.0 pg    MCHC 34.2 31.5 - 36.5 g/dL    RDW 13.2 10.0 - 15.0 %    Platelet Count 171 150 - 450 10e9/L   Lipid Profile (Chol, Trig, HDL, LDL calc) - FASTING   Result Value Ref Range    Cholesterol 162 <200 mg/dL    Triglycerides 231 (H) <150 mg/dL    HDL Cholesterol 44 23 - 92 mg/dL    LDL Cholesterol Calculated 72 <100 mg/dL    Non HDL Cholesterol 118 <130 mg/dL        Your blood tests look fine.  A couple of things are labeled high or low but they are not significant.  Congratulations on this report and keep up the good work.    Sincerely,        Evan Giles MD  Internal Medicine  [facility]

## 2018-03-13 ASSESSMENT — ANXIETY QUESTIONNAIRES: GAD7 TOTAL SCORE: 0

## 2018-03-13 ASSESSMENT — PATIENT HEALTH QUESTIONNAIRE - PHQ9: SUM OF ALL RESPONSES TO PHQ QUESTIONS 1-9: 3

## 2018-03-14 DIAGNOSIS — I10 ESSENTIAL HYPERTENSION: Primary | ICD-10-CM

## 2018-03-15 ENCOUNTER — HOSPITAL ENCOUNTER (OUTPATIENT)
Dept: MAMMOGRAPHY | Facility: OTHER | Age: 83
Discharge: HOME | End: 2018-03-15
Attending: INTERNAL MEDICINE | Admitting: INTERNAL MEDICINE
Payer: MEDICARE

## 2018-03-15 DIAGNOSIS — Z12.31 ENCOUNTER FOR SCREENING MAMMOGRAM FOR BREAST CANCER: ICD-10-CM

## 2018-03-15 PROCEDURE — 77067 SCR MAMMO BI INCL CAD: CPT

## 2018-03-19 RX ORDER — HYDROCHLOROTHIAZIDE 25 MG/1
25 TABLET ORAL DAILY
Qty: 90 TABLET | Refills: 3 | Status: SHIPPED | OUTPATIENT
Start: 2018-03-19 | End: 2019-03-17

## 2018-03-19 NOTE — TELEPHONE ENCOUNTER
Received a fax from [facility] that the patient needs a refill on his hydrochlorothiazide. The order is teed up below.  ЕЛЕНА STOVALL LPN 3/19/2018 1:09 PM

## 2018-03-29 ENCOUNTER — TELEPHONE (OUTPATIENT)
Dept: INTERNAL MEDICINE | Facility: OTHER | Age: 83
End: 2018-03-29

## 2018-03-29 NOTE — TELEPHONE ENCOUNTER
She dropped off a form for Dr Giles to do. He just had a question about the form. I asked her it and will let him know her answer.  Astrid Harris LPN..................3/29/2018   1:02 PM

## 2018-06-05 DIAGNOSIS — E03.9 HYPOTHYROIDISM: ICD-10-CM

## 2018-06-07 RX ORDER — LEVOTHYROXINE SODIUM 100 UG/1
TABLET ORAL
Qty: 90 TABLET | Refills: 2 | Status: SHIPPED | OUTPATIENT
Start: 2018-06-07 | End: 2019-03-17

## 2018-06-07 NOTE — TELEPHONE ENCOUNTER
"Prescription approved per [facility] Refill Protocol.  Patient T4 and thytropin checked on 3-12-18. Letter from PCP state \"blood tests look fine\", with no changes to medications.  Jenni Rivera RN on 6/7/2018 at 4:16 PM    "

## 2018-07-05 DIAGNOSIS — F51.01 PRIMARY INSOMNIA: Primary | ICD-10-CM

## 2018-07-09 RX ORDER — ZOLPIDEM TARTRATE 5 MG/1
TABLET ORAL
Qty: 30 TABLET | Refills: 3 | Status: SHIPPED | OUTPATIENT
Start: 2018-07-09 | End: 2018-10-28

## 2018-07-09 NOTE — TELEPHONE ENCOUNTER
PLEASE REVIEW, SIGN AND SEND AS APPROPRIATE: THANK YOU.    ZOLPIDEM 5MG TAB  Last Written Prescription Date:  3/12/18  Last Fill Quantity: 30,   # refills: 3  Last Office Visit: 3/12/18  Future Office visit:       Routing refill request to provider for review/approval because:  Drug not on the [facility], [facility] or [facility] refill protocol or controlled substance    Saundra Almeida RN on 7/9/2018 at 11:46 AM

## 2018-07-19 DIAGNOSIS — I10 ESSENTIAL HYPERTENSION: Primary | ICD-10-CM

## 2018-07-23 RX ORDER — ATENOLOL 100 MG/1
TABLET ORAL
Qty: 90 TABLET | Refills: 1 | Status: SHIPPED | OUTPATIENT
Start: 2018-07-23 | End: 2019-01-18

## 2018-07-23 NOTE — PROGRESS NOTES
Patient Information     Patient Name  Bea Flores MRN  [id number] Sex  Female   1935      Letter by Evan Giles MD at      Author:  Evan Giles MD Service:  (none) Author Type:  (none)    Filed:   Encounter Date:  2018 Status:  (Other)           Bea Flores  [address] [facility] [address]          2018        Dear Ms. Flores:    This letter is to remind you that you will be due for your annual exam with Evan Giles MD . Your last comprehensive medication visit was almost 12 months ago on 2017.     A refill of Norvasc has been sent into your pharmacy.  Additional refills of medications will require an annual medication management appointment and labs with Evan Giles MD. Please call the clinic at [phone] to schedule your appointment.    Thank you for choosing [facility] and [facility] for your health care needs.     Sincerely,        The Refill Nurse  [facility]

## 2018-07-23 NOTE — PROGRESS NOTES
Patient Information     Patient Name  Bea Flores MRN  [id number] Sex  Female   1935      Letter by Evan Giles MD at      Author:  Evan Giles MD Service:  (none) Author Type:  (none)    Filed:   Encounter Date:  2017 Status:  (Other)           Bea Flores  [address]   [place] [address]          2017    Dear Bea,    Following are the tests completed during your last clinic visit:    Results for orders placed or performed in visit on 17      COMP METABOLIC PANEL      Result  Value Ref Range    SODIUM 136 133 - 143 mmol/L    POTASSIUM 4.0 3.5 - 5.1 mmol/L    CHLORIDE 103 98 - 107 mmol/L    CO2,TOTAL 30 21 - 31 mmol/L    ANION GAP 3 (L) 5 - 18                    GLUCOSE 94 70 - 105 mg/dL    CALCIUM 10.1 8.6 - 10.3 mg/dL    BUN 17 7 - 25 mg/dL    CREATININE 0.87 0.70 - 1.30 mg/dL    BUN/CREAT RATIO           20                    GFR if African American >60 >60 ml/min/1.73m2    GFR if not African American >60 >60 ml/min/1.73m2    ALBUMIN 4.5 3.5 - 5.7 g/dL    PROTEIN,TOTAL 7.0 6.4 - 8.9 g/dL    GLOBULIN                  2.5 2.0 - 3.7 g/dL    A/G RATIO 1.8 1.0 - 2.0                    BILIRUBIN,TOTAL 1.0 0.3 - 1.0 mg/dL    ALK PHOSPHATASE 13 (L) 34 - 104 IU/L    ALT (SGPT) 20 7 - 52 IU/L    AST (SGOT) 21 13 - 39 IU/L   LIPID PANEL      Result  Value Ref Range    CHOLESTEROL,TOTAL 167 <200 mg/dL    TRIGLYCERIDES 141 <150 mg/dL    HDL CHOLESTEROL 47 23 - 92 mg/dL    NON-HDL CHOLESTEROL 120 <145 mg/dl    CHOL/HDL RATIO            3.55 <4.50                    LDL CHOLESTEROL 92 <100 mg/dL    PATIENT STATUS            FASTING                   TSH      Result  Value Ref Range    TSH 0.92 0.34 - 5.60 uIU/mL   T4,FREE      Result  Value Ref Range    T4,FREE 0.94 0.58 - 1.64 ng/dL   HEMOGLOBIN      Result  Value Ref Range    HEMOGLOBIN                15.2 12.0 - 16.0 g/dL    MCV                       94 80 - 100 fL         Your blood tests are normal. Congratulations on  this very good report. If you have any questions about your results, feel free to contact me.    Sincerely,      Evan Giles MD  Internal Medicine  [facility]

## 2018-07-23 NOTE — TELEPHONE ENCOUNTER
Prescription approved per [facility] Refill Protocol.  LOV: 3/12/18 follow up annually  atenolol (TENORMIN) 100 mg tablet    Indications: Essential hypertension  Judy Alvarado RN on 7/23/2018 at 10:52 AM

## 2018-09-18 ENCOUNTER — OFFICE VISIT (OUTPATIENT)
Dept: FAMILY MEDICINE | Facility: OTHER | Age: 83
End: 2018-09-18
Attending: NURSE PRACTITIONER
Payer: COMMERCIAL

## 2018-09-18 VITALS
SYSTOLIC BLOOD PRESSURE: 138 MMHG | BODY MASS INDEX: 19.78 KG/M2 | WEIGHT: 119.8 LBS | TEMPERATURE: 98.1 F | DIASTOLIC BLOOD PRESSURE: 78 MMHG | RESPIRATION RATE: 20 BRPM | HEART RATE: 80 BPM

## 2018-09-18 DIAGNOSIS — J01.00 ACUTE NON-RECURRENT MAXILLARY SINUSITIS: Primary | ICD-10-CM

## 2018-09-18 PROCEDURE — 99213 OFFICE O/P EST LOW 20 MIN: CPT | Performed by: NURSE PRACTITIONER

## 2018-09-18 PROCEDURE — G0463 HOSPITAL OUTPT CLINIC VISIT: HCPCS

## 2018-09-18 NOTE — MR AVS SNAPSHOT
"              After Visit Summary   2018    Bea Flores    MRN: [id number]           Patient Information     Date Of Birth          1935        Visit Information        Provider Department      2018 9:45 AM Florecita Schultz APRN CNP [facility]        Today's Diagnoses     Acute non-recurrent maxillary sinusitis    -  1      Care Instructions    Augmentin twice daily for 10 days  Try flonase nasal spray daily  Warm compresses to face  Tylenol as needed          Follow-ups after your visit        Who to contact     If you have questions or need follow up information about today's clinic visit or your schedule please contact [facility] directly at [phone].  Normal or non-critical lab and imaging results will be communicated to you by [employer], letter or phone within 4 business days after the clinic has received the results. If you do not hear from us within 7 days, please contact the clinic through [employer] or phone. If you have a critical or abnormal lab result, we will notify you by phone as soon as possible.  Submit refill requests through [employer] or call your pharmacy and they will forward the refill request to us. Please allow 3 business days for your refill to be completed.          Additional Information About Your Visit        MyChart Information     [employer] lets you send messages to your doctor, view your test results, renew your prescriptions, schedule appointments and more. To sign up, go to [email or web address] . Click on \"Log in\" on the left side of the screen, which will take you to the Welcome page. Then click on \"Sign up Now\" on the right side of the page.     You will be asked to enter the access code listed below, as well as some personal information. Please follow the directions to create your username and password.     Your access code is: [email or web address]  Expires: 2018 10:16 AM     Your access code will  in 90 days. If " you need help or a new code, please call your [facility] clinic or [phone].        Care EveryWhere ID     This is your Care EveryWhere ID. This could be used by other organizations to access your [facility] medical records  [id number]        Your Vitals Were     Pulse Temperature Respirations BMI (Body Mass Index)          80 98.1  F (36.7  C) (Tympanic) 20 19.78 kg/m2         Blood Pressure from Last 3 Encounters:   09/18/18 138/78   03/12/18 135/75   02/20/18 132/64    Weight from Last 3 Encounters:   09/18/18 119 lb 12.8 oz (54.3 kg)   03/12/18 119 lb 3.2 oz (54.1 kg)   02/20/18 119 lb (54 kg)              Today, you had the following     No orders found for display         Today's Medication Changes          These changes are accurate as of 9/18/18 10:17 AM.  If you have any questions, ask your nurse or doctor.               Start taking these medicines.        Dose/Directions    amoxicillin-clavulanate 875-125 MG per tablet   Commonly known as:  AUGMENTIN   Used for:  Acute non-recurrent maxillary sinusitis   Started by:  Florecita Schultz APRN CNP        Dose:  1 tablet   Take 1 tablet by mouth 2 times daily   Quantity:  20 tablet   Refills:  0            Where to get your medicines      These medications were sent to [facility] [address] [address]     Phone:  [phone]     amoxicillin-clavulanate 875-125 MG per tablet                Primary Care Provider Office Phone # Fax #    Evan Giles [phone]       [address] [facility] [address]        Equal Access to Services     [facility] : Hadsuzi Ramírez, marylou willoughby, christophe layne. So [facility] [phone].    ATENCIÓN: Si habla español, tiene a noel disposición servicios gratuitos de asistencia lingüística. Llame al [phone].    We comply with applicable federal civil  rights laws and [place] laws. We do not discriminate on the basis of race, color, national origin, age, disability, sex, sexual orientation, or gender identity.            Thank you!     Thank you for choosing [facility] AND [facility]  for your care. Our goal is always to provide you with excellent care. Hearing back from our patients is one way we can continue to improve our services. Please take a few minutes to complete the written survey that you may receive in the mail after your visit with us. Thank you!             Your Updated Medication List - Protect others around you: Learn how to safely use, store and throw away your medicines at www.disposemymeds.org.          This list is accurate as of 9/18/18 10:17 AM.  Always use your most recent med list.                   Brand Name Dispense Instructions for use Diagnosis    acetaminophen 500 MG tablet    TYLENOL     Take 1,000 mg by mouth every 6 hours as needed Max acetaminophen dose: 4000 mg in 24 hours        amLODIPine 5 MG tablet    NORVASC    90 tablet    Take 1 tablet (5 mg) by mouth daily    Essential hypertension       amoxicillin-clavulanate 875-125 MG per tablet    AUGMENTIN    20 tablet    Take 1 tablet by mouth 2 times daily    Acute non-recurrent maxillary sinusitis       atenolol 100 MG tablet    TENORMIN    90 tablet    TAKE ONE TABLET BY MOUTH ONCE DAILY    Essential hypertension       CALCIUM 600+D PLUS MINERALS 600-400 MG-UNIT Tabs      Take 1 tablet by mouth daily with food        GOODSENSE ASPIRIN 325 MG tablet   Generic drug:  aspirin      Take 325 mg by mouth daily with food        hydrochlorothiazide 25 MG tablet    HYDRODIURIL    90 tablet    Take 1 tablet (25 mg) by mouth daily    Essential hypertension       levothyroxine 100 MCG tablet    SYNTHROID/LEVOTHROID    90 tablet    TAKE 1 TABLET BY MOUTH ONCE DAILY IN THE MORNING BEFORE BREAKFAST    Hypothyroidism       simvastatin 40 MG tablet    ZOCOR    90 tablet    Take 1 tablet  (40 mg) by mouth At Bedtime    Hyperlipidemia, unspecified hyperlipidemia type       zolpidem 5 MG tablet    AMBIEN    30 tablet    TAKE 1 TABLET BY MOUTH AT BEDTIME    Primary insomnia

## 2018-09-18 NOTE — PATIENT INSTRUCTIONS
Augmentin twice daily for 10 days  Try flonase nasal spray daily  Warm compresses to face  Tylenol as needed

## 2018-09-18 NOTE — NURSING NOTE
Patient presents today for a possible sinus infection. She has a lot of facial pressure and it goes into her ear.  Ida Houston............................... 9/18/2018 9:51 AM

## 2018-09-18 NOTE — PROGRESS NOTES
HPI:    Bea Flores is a 82 year old female who presents to clinic today for sinus concerns. Started last Tuesday as a scratchy sore throat and got worse everyday- progressed to nonproductive coughing, runny nose. No fevers or drenching night sweats, SOB, chest pain or abdominal pain. Head hurts on top of head, worsens when bending over, also endorses right ear pain. One episode of diarrhea yesterday, she is usually more constipated. Taking tylenol for sx. No other OTC medications used.   Returned from [place] [facility] to [place] yesterday. Personal history of sinus infections, unsure of last episode, and walking pneumonia 40+ years ago. No known sick contacts, retired  and works at [employer] [place]      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     Essential (primary) hypertension     No Comments Provided     Hyperlipidemia     No Comments Provided     Hypothyroidism     No Comments Provided     Migraine with aura and without status migrainosus, not intractable     No Comments Provided     Postmenopausal atrophic vaginitis     No Comments Provided     Sciatica of right side     Intermittent right sciatica     Transient global amnesia     2014     Varicose veins of other specified sites (CODE)     No Comments Provided       Social History     Social History     Marital status:      Spouse name: N/A     Number of children: N/A     Years of education: N/A     Occupational History     Not on file.     Social History Main Topics     Smoking status: Former Smoker     Types: Cigarettes     Quit date: 3/12/1990     Smokeless tobacco: Never Used     Alcohol use Yes      Comment: occ wine     Drug use: No     Sexual activity: Not on file     Other Topics Concern     Not on file     Social History Narrative    .    of heart disease.  She is a retired  and travels with her daughter who lives in [place].  Presently  working in the [place] area at [employer].  Lives in town.  Two children.       Current Outpatient Prescriptions   Medication Sig Dispense Refill     amoxicillin-clavulanate (AUGMENTIN) 875-125 MG per tablet Take 1 tablet by mouth 2 times daily 20 tablet 0     acetaminophen (TYLENOL) 500 MG tablet Take 1,000 mg by mouth every 6 hours as needed Max acetaminophen dose: 4000 mg in 24 hours       amLODIPine (NORVASC) 5 MG tablet Take 1 tablet (5 mg) by mouth daily 90 tablet 3     aspirin (GOODSENSE ASPIRIN) 325 MG tablet Take 325 mg by mouth daily with food       atenolol (TENORMIN) 100 MG tablet TAKE ONE TABLET BY MOUTH ONCE DAILY 90 tablet 1     Calcium Carbonate-Vit D-Min (CALCIUM 600+D PLUS MINERALS) 600-400 MG-UNIT TABS Take 1 tablet by mouth daily with food       hydrochlorothiazide (HYDRODIURIL) 25 MG tablet Take 1 tablet (25 mg) by mouth daily 90 tablet 3     levothyroxine (SYNTHROID/LEVOTHROID) 100 MCG tablet TAKE 1 TABLET BY MOUTH ONCE DAILY IN THE MORNING BEFORE BREAKFAST 90 tablet 2     simvastatin (ZOCOR) 40 MG tablet Take 1 tablet (40 mg) by mouth At Bedtime 90 tablet 3     zolpidem (AMBIEN) 5 MG tablet TAKE 1 TABLET BY MOUTH AT BEDTIME 30 tablet 3       Allergies   Allergen Reactions     Trazodone Shortness Of Breath and Unknown     Codeine Unknown     Lisinopril Cough and Unknown     Pneumococcal 13-Bertha Conj Vacc Unknown       ROS:  Pertinent positives and negatives are noted in HPI.    EXAM:  General appearance: well appearing female, in no acute distress  Head: Tender maxillary sinuses, Right more than left, frontal sinuses not as tender  Ears: TM's with cone of light, some opacities, no erythema, canals with moderate amount of cerumen, uses hearing aid on right  Orophayrnx: moist mucous membranes, tonsils without erythema, exudates or petechiae, no post nasal drip seen  Nose: Swollen right nasal turbinates, no erythema or edema, left nares patent, turbinates pink without edema or blood.   Neck: supple  without adenopathy  Respiratory: clear to auscultation bilaterally, dry coughs noted a few times throughout exam  Cardiac: RRR with no murmurs  Psychological: normal affect, alert and pleasant    ASSESSMENT AND PLAN:    1. Acute non-recurrent maxillary sinusitis      Tx with Augmentin for acute sinusitis. Did discuss viral vs bacterial. She does have worsening of sx 7 days into what started as viral URI. Encouraged use of warm compresses, flonase and increased fluids. Reviewed need to complete all antibiotics. Discussed typical course of illness, symptomatic treatment and when to return to clinic. Patient in agreement with plan and all questions were answered.         Florecita Schultz..................9/18/2018 9:51 AM

## 2018-10-08 ENCOUNTER — OFFICE VISIT (OUTPATIENT)
Dept: FAMILY MEDICINE | Facility: OTHER | Age: 83
End: 2018-10-08
Attending: FAMILY MEDICINE
Payer: MEDICARE

## 2018-10-08 VITALS
BODY MASS INDEX: 20.56 KG/M2 | SYSTOLIC BLOOD PRESSURE: 124 MMHG | TEMPERATURE: 97.1 F | WEIGHT: 123.4 LBS | HEIGHT: 65 IN | HEART RATE: 60 BPM | DIASTOLIC BLOOD PRESSURE: 78 MMHG

## 2018-10-08 DIAGNOSIS — R07.0 THROAT PAIN: Primary | ICD-10-CM

## 2018-10-08 LAB
DEPRECATED S PYO AG THROAT QL EIA: NORMAL
SPECIMEN SOURCE: NORMAL

## 2018-10-08 PROCEDURE — 87880 STREP A ASSAY W/OPTIC: CPT | Performed by: FAMILY MEDICINE

## 2018-10-08 PROCEDURE — G0463 HOSPITAL OUTPT CLINIC VISIT: HCPCS

## 2018-10-08 PROCEDURE — 99213 OFFICE O/P EST LOW 20 MIN: CPT | Performed by: FAMILY MEDICINE

## 2018-10-08 ASSESSMENT — PAIN SCALES - GENERAL: PAINLEVEL: MODERATE PAIN (5)

## 2018-10-08 NOTE — PROGRESS NOTES
"Nursing Notes:   Thelma TrevizoRed, LPN  10/8/2018 10:44 AM  Signed  Patient presents to clinic with ear pain and sore throat. She did complete 10 days of Augmentin that she was prescribed on 9/18/2018 that helped the sinus pain/pressure but ears and throat continue to hurt.  Thelma GRAFRed Skinner ....................  10/8/2018   10:25 AM        SUBJECTIVE: Bea Flores is a 82 year old female patient complaining of persistent sore throat and was here on 90/18/2018 and treated with Augmentin.  Still having mild sore throat and feels like her \"head feels hollow\". Some days her eyes are itchy. Uses drops for dry eyes and glaucoma. Hurts when swallowing into her right ear. No fever or chills.     OBJECTIVE:   /78  Pulse 60  Temp 97.1  F (36.2  C)  Ht 5' 5.25\" (1.657 m)  Wt 123 lb 6.4 oz (56 kg)  Breastfeeding? No  BMI 20.38 kg/m2    The patient appears healthy, alert and no distress.   EARS: External ears normal. Canals clear. TM's normal.  NOSE/SINUS: Nares normal. Septum midline. Mucosa normal. No drainage or sinus tenderness.  Sinus palpation: Frontal sinus and Maxillary sinus nontender to palpation   THROAT: Scarring from tonsillectomy, small shallow ulceration right posterior buccal mucosa and no other ulcerations or petechiae.  NECK:Neck supple. No adenopathy. Thyroid symmetric, normal size,   CHEST: Clear to auscultation    ASSESSMENT:   1. Throat pain        PLAN:  Symptomatic treatment.  Try Cepastat or Cepacol spray as she does seem to have a shallow ulcer on the posterior oropharynx on the right side.  This could be viral and should give it time to heal.  If not improving in the next couple of weeks her throat should be reassessed since she does have a smoking history.  Irais Caldera MD  3:01 PM 10/8/2018   Portions of this dictation were created using the Dragon Nuance voice recognition system. Proofreading was completed but there may be errors in text.      "

## 2018-10-08 NOTE — NURSING NOTE
Patient presents to clinic with ear pain and sore throat. She did complete 10 days of Augmentin that she was prescribed on 9/18/2018 that helped the sinus pain/pressure but ears and throat continue to hurt.  Thelma Skinner ....................  10/8/2018   10:25 AM

## 2018-10-08 NOTE — PATIENT INSTRUCTIONS
Self-Care for Sore Throats    Sore throats happen for many reasons, such as colds, allergies, and infections caused by viruses or bacteria. In any case, your throat becomes red and sore. Your goal for self-care is to reduce your discomfort while giving your throat a chance to heal.  Moisten and soothe your throat  Tips include the following:    Try a sip of water first thing after waking up.    Keep your throat moist by drinking 6 or more glasses of clear liquids every day.    Run a cool-air humidifier in your room overnight.    Avoid cigarette smoke.     Suck on throat lozenges, cough drops, hard candy, ice chips, or frozen fruit-juice bars. Use the sugar-free versions if your diet or medical condition requires them.  Gargle to ease irritation  Gargling every hour or 2 can ease irritation. Try gargling with 1 of these solutions:    1/4 teaspoon of salt in 1/2 cup of warm water    An over-the-counter anesthetic gargle  Use medicine for more relief  Over-the-counter medicine can reduce sore throat symptoms. Ask your pharmacist if you have questions about which medicine to use:    Ease pain with anesthetic sprays. Aspirin or an aspirin substitute also helps. Remember, never give aspirin to anyone 18 or younger, or if you are already taking blood thinners.     For sore throats caused by allergies, try antihistamines to block the allergic reaction.    Remember: unless a sore throat is caused by a bacterial infection, antibiotics won t help you.  Prevent future sore throats  Prevention tips include the following:    Stop smoking or reduce contact with secondhand smoke. Smoke irritates the tender throat lining.    Limit contact with pets and with allergy-causing substances, such as pollen and mold.    When you re around someone with a sore throat or cold, wash your hands often to keep viruses or bacteria from spreading.    Don t strain your vocal cords.  Call your healthcare provider  Contact your healthcare provider if  you have:    A temperature over 101 F (38.3 C)    White spots on the throat    Great difficulty swallowing    Trouble breathing    A skin rash    Recent exposure to someone else with strep bacteria    Severe hoarseness and swollen glands in the neck or jaw   Date Last Reviewed: 8/1/2016 2000-2017 [employer]. [address] [place], [place] [address]. All rights reserved. This information is not intended as a substitute for professional medical care. Always follow your healthcare professional's instructions.

## 2018-10-08 NOTE — MR AVS SNAPSHOT
After Visit Summary   10/8/2018    Bea Flores    MRN: [id number]           Patient Information     Date Of Birth          1935        Visit Information        Provider Department      10/8/2018 10:30 AM Irais Caldera MD [facility]        Today's Diagnoses     Throat pain    -  1      Care Instructions      Self-Care for Sore Throats    Sore throats happen for many reasons, such as colds, allergies, and infections caused by viruses or bacteria. In any case, your throat becomes red and sore. Your goal for self-care is to reduce your discomfort while giving your throat a chance to heal.  Moisten and soothe your throat  Tips include the following:    Try a sip of water first thing after waking up.    Keep your throat moist by drinking 6 or more glasses of clear liquids every day.    Run a cool-air humidifier in your room overnight.    Avoid cigarette smoke.     Suck on throat lozenges, cough drops, hard candy, ice chips, or frozen fruit-juice bars. Use the sugar-free versions if your diet or medical condition requires them.  Gargle to ease irritation  Gargling every hour or 2 can ease irritation. Try gargling with 1 of these solutions:    1/4 teaspoon of salt in 1/2 cup of warm water    An over-the-counter anesthetic gargle  Use medicine for more relief  Over-the-counter medicine can reduce sore throat symptoms. Ask your pharmacist if you have questions about which medicine to use:    Ease pain with anesthetic sprays. Aspirin or an aspirin substitute also helps. Remember, never give aspirin to anyone 18 or younger, or if you are already taking blood thinners.     For sore throats caused by allergies, try antihistamines to block the allergic reaction.    Remember: unless a sore throat is caused by a bacterial infection, antibiotics won t help you.  Prevent future sore throats  Prevention tips include the following:    Stop smoking or reduce contact with secondhand smoke.  "Smoke irritates the tender throat lining.    Limit contact with pets and with allergy-causing substances, such as pollen and mold.    When you re around someone with a sore throat or cold, wash your hands often to keep viruses or bacteria from spreading.    Don t strain your vocal cords.  Call your healthcare provider  Contact your healthcare provider if you have:    A temperature over 101 F (38.3 C)    White spots on the throat    Great difficulty swallowing    Trouble breathing    A skin rash    Recent exposure to someone else with strep bacteria    Severe hoarseness and swollen glands in the neck or jaw   Date Last Reviewed: 8/1/2016 2000-2017 [employer]. [address] [place] [address]. All rights reserved. This information is not intended as a substitute for professional medical care. Always follow your healthcare professional's instructions.                Follow-ups after your visit        Who to contact     If you have questions or need follow up information about today's clinic visit or your schedule please contact [facility] directly at [phone].  Normal or non-critical lab and imaging results will be communicated to you by Quanlighthart, letter or phone within 4 business days after the clinic has received the results. If you do not hear from us within 7 days, please contact the clinic through eyetokt or phone. If you have a critical or abnormal lab result, we will notify you by phone as soon as possible.  Submit refill requests through [employer] or call your pharmacy and they will forward the refill request to us. Please allow 3 business days for your refill to be completed.          Additional Information About Your Visit        [employer] Information     [employer] lets you send messages to your doctor, view your test results, renew your prescriptions, schedule appointments and more. To sign up, go to [email or web address] . Click on \"Log in\" on the left side " "of the screen, which will take you to the Welcome page. Then click on \"Sign up Now\" on the right side of the page.     You will be asked to enter the access code listed below, as well as some personal information. Please follow the directions to create your username and password.     Your access code is: [email or web address]  Expires: 2018 10:16 AM     Your access code will  in 90 days. If you need help or a new code, please call your [place] clinic or [phone].        Care EveryWhere ID     This is your Care EveryWhere ID. This could be used by other organizations to access your [place] medical records  [id number]        Your Vitals Were     Pulse Temperature Height Breastfeeding? BMI (Body Mass Index)       60 97.1  F (36.2  C) 5' 5.25\" (1.657 m) No 20.38 kg/m2        Blood Pressure from Last 3 Encounters:   10/08/18 124/78   18 138/78   18 135/75    Weight from Last 3 Encounters:   10/08/18 123 lb 6.4 oz (56 kg)   18 119 lb 12.8 oz (54.3 kg)   18 119 lb 3.2 oz (54.1 kg)              We Performed the Following     Strep, Rapid Screen        Primary Care Provider Office Phone # Fax #    Evan Giles [phone] [address] [facility] [address]        Equal Access to Services     [facility]: Hadii marlys monge hadkaylieo Sojustin, waaxda luqadaha, qaybta kaalmada christophe gamboa . So [facility] [phone].    ATENCIÓN: Si alla gabi, tiene a noel disposición servicios gratuitos de asistencia lingüística. Llame al [phone].    We comply with applicable federal civil rights laws and [place] laws. We do not discriminate on the basis of race, color, national origin, age, disability, sex, sexual orientation, or gender identity.            Thank you!     Thank you for choosing [facility] AND hospitals  for your care. Our goal is always to provide you with excellent care. Hearing back from our patients is one " way we can continue to improve our services. Please take a few minutes to complete the written survey that you may receive in the mail after your visit with us. Thank you!             Your Updated Medication List - Protect others around you: Learn how to safely use, store and throw away your medicines at www.disposemymeds.org.          This list is accurate as of 10/8/18 10:52 AM.  Always use your most recent med list.                   Brand Name Dispense Instructions for use Diagnosis    acetaminophen 500 MG tablet    TYLENOL     Take 1,000 mg by mouth every 6 hours as needed Max acetaminophen dose: 4000 mg in 24 hours        amLODIPine 5 MG tablet    NORVASC    90 tablet    Take 1 tablet (5 mg) by mouth daily    Essential hypertension       atenolol 100 MG tablet    TENORMIN    90 tablet    TAKE ONE TABLET BY MOUTH ONCE DAILY    Essential hypertension       CALCIUM 600+D PLUS MINERALS 600-400 MG-UNIT Tabs      Take 1 tablet by mouth daily with food        GOODSENSE ASPIRIN 325 MG tablet   Generic drug:  aspirin      Take 325 mg by mouth daily with food        hydrochlorothiazide 25 MG tablet    HYDRODIURIL    90 tablet    Take 1 tablet (25 mg) by mouth daily    Essential hypertension       levothyroxine 100 MCG tablet    SYNTHROID/LEVOTHROID    90 tablet    TAKE 1 TABLET BY MOUTH ONCE DAILY IN THE MORNING BEFORE BREAKFAST    Hypothyroidism       simvastatin 40 MG tablet    ZOCOR    90 tablet    Take 1 tablet (40 mg) by mouth At Bedtime    Hyperlipidemia, unspecified hyperlipidemia type       zolpidem 5 MG tablet    AMBIEN    30 tablet    TAKE 1 TABLET BY MOUTH AT BEDTIME    Primary insomnia

## 2018-10-31 DIAGNOSIS — H91.93 DECREASED HEARING OF BOTH EARS: Primary | ICD-10-CM

## 2018-11-27 ENCOUNTER — OFFICE VISIT (OUTPATIENT)
Dept: OTOLARYNGOLOGY | Facility: OTHER | Age: 83
End: 2018-11-27
Attending: OTOLARYNGOLOGY
Payer: MEDICARE

## 2018-11-27 DIAGNOSIS — H90.3 SENSORY HEARING LOSS, BILATERAL: Primary | ICD-10-CM

## 2018-11-27 PROCEDURE — G0463 HOSPITAL OUTPT CLINIC VISIT: HCPCS

## 2018-11-27 NOTE — MR AVS SNAPSHOT
After Visit Summary   11/27/2018    Bea Flores    MRN: [id number]           Patient Information     Date Of Birth          1935        Visit Information        Provider Department      11/27/2018 2:00 PM Bentley Abdi MD [facility]        Today's Diagnoses     Sensory hearing loss, bilateral    -  1       Follow-ups after your visit        Who to contact     If you have questions or need follow up information about today's clinic visit or your schedule please contact [facility] directly at [phone].  Normal or non-critical lab and imaging results will be communicated to you by MyChart, letter or phone within 4 business days after the clinic has received the results. If you do not hear from us within 7 days, please contact the clinic through MyChart or phone. If you have a critical or abnormal lab result, we will notify you by phone as soon as possible.  Submit refill requests through [employer] or call your pharmacy and they will forward the refill request to us. Please allow 3 business days for your refill to be completed.          Additional Information About Your Visit        Care EveryWhere ID     This is your Care EveryWhere ID. This could be used by other organizations to access your [place] medical records  [id number]         Blood Pressure from Last 3 Encounters:   11/28/18 138/78   10/08/18 124/78   09/18/18 138/78    Weight from Last 3 Encounters:   11/28/18 54.6 kg (120 lb 6.4 oz)   10/08/18 56 kg (123 lb 6.4 oz)   09/18/18 54.3 kg (119 lb 12.8 oz)              Today, you had the following     No orders found for display       Primary Care Provider Office Phone # Fax #    Evan Giles [phone] [address] [facility] [address]        Equal Access to Services     FADY LEZAMA : Jose R Ramírez, marylou willoughby, christophe layne.  So [facility] [phone].    ATENCIÓN: Si debo ashley, tiene a noel disposición servicios gratuitos de asistencia lingüística. Jacqueline bunch [phone].    We comply with applicable federal civil rights laws and [place] laws. We do not discriminate on the basis of race, color, national origin, age, disability, sex, sexual orientation, or gender identity.            Thank you!     Thank you for choosing [facility]  for your care. Our goal is always to provide you with excellent care. Hearing back from our patients is one way we can continue to improve our services. Please take a few minutes to complete the written survey that you may receive in the mail after your visit with us. Thank you!             Your Updated Medication List - Protect others around you: Learn how to safely use, store and throw away your medicines at www.disposemymeds.org.          This list is accurate as of 11/27/18 11:59 PM.  Always use your most recent med list.                   Brand Name Dispense Instructions for use Diagnosis    acetaminophen 500 MG tablet    TYLENOL     Take 1,000 mg by mouth every 6 hours as needed Max acetaminophen dose: 4000 mg in 24 hours        amLODIPine 5 MG tablet    NORVASC    90 tablet    Take 1 tablet (5 mg) by mouth daily    Essential hypertension       atenolol 100 MG tablet    TENORMIN    90 tablet    TAKE ONE TABLET BY MOUTH ONCE DAILY    Essential hypertension       CALCIUM 600+D PLUS MINERALS 600-400 MG-UNIT Tabs      Take 1 tablet by mouth daily with food        GOODSENSE ASPIRIN 325 MG tablet   Generic drug:  aspirin      Take 325 mg by mouth daily with food        hydrochlorothiazide 25 MG tablet    HYDRODIURIL    90 tablet    Take 1 tablet (25 mg) by mouth daily    Essential hypertension       levothyroxine 100 MCG tablet    SYNTHROID/LEVOTHROID    90 tablet    TAKE 1 TABLET BY MOUTH ONCE DAILY IN THE MORNING BEFORE BREAKFAST    Hypothyroidism       simvastatin 40 MG tablet    ZOCOR     90 tablet    Take 1 tablet (40 mg) by mouth At Bedtime    Hyperlipidemia, unspecified hyperlipidemia type

## 2018-11-28 ENCOUNTER — OFFICE VISIT (OUTPATIENT)
Dept: INTERNAL MEDICINE | Facility: OTHER | Age: 83
End: 2018-11-28
Attending: INTERNAL MEDICINE
Payer: COMMERCIAL

## 2018-11-28 VITALS
HEART RATE: 56 BPM | WEIGHT: 120.4 LBS | SYSTOLIC BLOOD PRESSURE: 138 MMHG | BODY MASS INDEX: 20.06 KG/M2 | DIASTOLIC BLOOD PRESSURE: 78 MMHG | HEIGHT: 65 IN

## 2018-11-28 DIAGNOSIS — F51.01 PRIMARY INSOMNIA: ICD-10-CM

## 2018-11-28 PROCEDURE — 99213 OFFICE O/P EST LOW 20 MIN: CPT | Performed by: INTERNAL MEDICINE

## 2018-11-28 PROCEDURE — G0463 HOSPITAL OUTPT CLINIC VISIT: HCPCS

## 2018-11-28 RX ORDER — ZOLPIDEM TARTRATE 5 MG/1
5 TABLET ORAL AT BEDTIME
Qty: 30 TABLET | Refills: 5 | Status: SHIPPED | OUTPATIENT
Start: 2018-11-28 | End: 2019-05-15

## 2018-11-28 ASSESSMENT — ENCOUNTER SYMPTOMS
EYES NEGATIVE: 1
ALLERGIC/IMMUNOLOGIC NEGATIVE: 1
ENDOCRINE NEGATIVE: 1
CONSTITUTIONAL NEGATIVE: 1

## 2018-11-28 ASSESSMENT — PATIENT HEALTH QUESTIONNAIRE - PHQ9
SUM OF ALL RESPONSES TO PHQ QUESTIONS 1-9: 0
5. POOR APPETITE OR OVEREATING: NOT AT ALL

## 2018-11-28 ASSESSMENT — ANXIETY QUESTIONNAIRES
GAD7 TOTAL SCORE: 0
6. BECOMING EASILY ANNOYED OR IRRITABLE: NOT AT ALL
1. FEELING NERVOUS, ANXIOUS, OR ON EDGE: NOT AT ALL
7. FEELING AFRAID AS IF SOMETHING AWFUL MIGHT HAPPEN: NOT AT ALL
3. WORRYING TOO MUCH ABOUT DIFFERENT THINGS: NOT AT ALL
5. BEING SO RESTLESS THAT IT IS HARD TO SIT STILL: NOT AT ALL
IF YOU CHECKED OFF ANY PROBLEMS ON THIS QUESTIONNAIRE, HOW DIFFICULT HAVE THESE PROBLEMS MADE IT FOR YOU TO DO YOUR WORK, TAKE CARE OF THINGS AT HOME, OR GET ALONG WITH OTHER PEOPLE: NOT DIFFICULT AT ALL
2. NOT BEING ABLE TO STOP OR CONTROL WORRYING: NOT AT ALL

## 2018-11-28 NOTE — NURSING NOTE
"The patient is here today to get a refill on her medication.  Maday So LPN on 11/28/2018 at 1:12 PM  Chief Complaint   Patient presents with     Recheck Medication       Initial /78 (BP Location: Right arm, Patient Position: Sitting, Cuff Size: Adult Regular)  Pulse 56  Ht 5' 5.25\" (1.657 m)  Wt 120 lb 6.4 oz (54.6 kg)  Breastfeeding? No  BMI 19.88 kg/m2 Estimated body mass index is 19.88 kg/(m^2) as calculated from the following:    Height as of this encounter: 5' 5.25\" (1.657 m).    Weight as of this encounter: 120 lb 6.4 oz (54.6 kg).  Medication Reconciliation: complete    Maday So LPN    "

## 2018-11-28 NOTE — PROGRESS NOTES
Chief Complaint:  Insomnia.    HPI: She comes in today for refill on her Ambien.  She takes 5 mg every night.  She has tried over-the-counter medications and has not done well with those, they cause grogginess and other problems.  She would prefer just to stay on the Ambien as it works well and she has absolutely no side effects with it.    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     EXTRACAPSULAR CATARACT EXTRATION WITH INTRAOCULAR LENS IMPLANT Bilateral     No Comments Provided     TONSILLECTOMY, ADENOIDECTOMY, COMBINED      No Comments Provided       Allergies   Allergen Reactions     Trazodone Shortness Of Breath and Unknown     Codeine Unknown     Lisinopril Cough and Unknown     Pneumococcal 13-Bertha Conj Vacc Unknown       Current Outpatient Prescriptions   Medication Sig Dispense Refill     acetaminophen (TYLENOL) 500 MG tablet Take 1,000 mg by mouth every 6 hours as needed Max acetaminophen dose: 4000 mg in 24 hours       amLODIPine (NORVASC) 5 MG tablet Take 1 tablet (5 mg) by mouth daily 90 tablet 3     aspirin (GOODSENSE ASPIRIN) 325 MG tablet Take 325 mg by mouth daily with food       atenolol (TENORMIN) 100 MG tablet TAKE ONE TABLET BY MOUTH ONCE DAILY 90 tablet 1     Calcium Carbonate-Vit D-Min (CALCIUM 600+D PLUS  Please see patient's e-mail.     MINERALS) 600-400 MG-UNIT TABS Take 1 tablet by mouth daily with food       hydrochlorothiazide (HYDRODIURIL) 25 MG tablet Take 1 tablet (25 mg) by mouth daily 90 tablet 3     levothyroxine (SYNTHROID/LEVOTHROID) 100 MCG tablet TAKE 1 TABLET BY MOUTH ONCE DAILY IN THE MORNING BEFORE BREAKFAST 90 tablet 2     simvastatin (ZOCOR) 40 MG tablet Take 1 tablet (40 mg) by mouth At Bedtime 90 tablet 3     zolpidem (AMBIEN) 5 MG tablet Take 1 tablet (5 mg) by mouth At Bedtime 30 tablet 5       Review of Systems   Constitutional: Negative.    Eyes: Negative.    Endocrine: Negative.    Allergic/Immunologic: Negative.        Physical Exam   Constitutional: She appears well-developed and well-nourished. No distress.   Skin: She is not diaphoretic.   Nursing note and vitals reviewed.      Assessment:        ICD-10-CM    1. Primary insomnia F51.01 zolpidem (AMBIEN) 5 MG tablet       Plan: We reviewed the concerns regarding the prescription Ambien for sleep.  She has no side effects or problems and it works very well for her so I am going to keep her on this.  She was given a prescription for 6 months at which time she will return for a complete evaluation.

## 2018-11-28 NOTE — MR AVS SNAPSHOT
After Visit Summary   11/28/2018    Bea Flores    MRN: [id number]           Patient Information     Date Of Birth          1935        Visit Information        Provider Department      11/28/2018 1:20 PM Evan Giles MD [facility]        Today's Diagnoses     Primary insomnia           Follow-ups after your visit        Your next 10 appointments already scheduled     Nov 30, 2018  1:30 PM CST   (Arrive by 1:15 PM)   Hearing Eval with Saurabh Clark   [facility] [place] [facility] [place] [facility]    [address]  [place] [address]   [phone]            Nov 30, 2018  2:00 PM CST   (Arrive by 1:45 PM)   New Visit with Sydnie Díaz PA-C   [facility] [place] [facility] [place] [facility]    [address]  [place] [address]   [phone]            Nov 30, 2018  2:45 PM CST   (Arrive by 2:30 PM)   Hearing Aid Consult with Saurabh Clark   [facility] [place] [facility] [place] [facility]    [address]  [place] [address]   [phone]            Dec 19, 2018 10:15 AM CST   (Arrive by 10:00 AM)   Hearing Aid Fitting with Saurabh Clark   [facility] [place] [facility] [place] [facility]    [address]  [place] [address]   [phone]            Jan 16, 2019  3:00 PM CST   (Arrive by 2:45 PM)   Return Visit with Saurabh Clark   [facility] [place] [facility] [place] [facility]    [address]  [place] [address]   [phone]              Who to contact     If you have questions or need follow up information about today's clinic visit or your schedule please contact [facility] directly at [phone].  Normal or non-critical lab and imaging results will be communicated to you by MyChart, letter or phone within 4 business days after the clinic has received the results. If you do not hear from us within  "7 days, please contact the clinic through "[employer] or phone. If you have a critical or abnormal lab result, we will notify you by phone as soon as possible.  Submit refill requests through "[employer] or call your pharmacy and they will forward the refill request to us. Please allow 3 business days for your refill to be completed.          Additional Information About Your Visit        Care EveryWhere ID     This is your Care EveryWhere ID. This could be used by other organizations to access your [place] medical records  [id number]        Your Vitals Were     Pulse Height Breastfeeding? BMI (Body Mass Index)          56 5' 5.25\" (1.657 m) No 19.88 kg/m2         Blood Pressure from Last 3 Encounters:   11/28/18 138/78   10/08/18 124/78   09/18/18 138/78    Weight from Last 3 Encounters:   11/28/18 120 lb 6.4 oz (54.6 kg)   10/08/18 123 lb 6.4 oz (56 kg)   09/18/18 119 lb 12.8 oz (54.3 kg)              Today, you had the following     No orders found for display         Today's Medication Changes          These changes are accurate as of 11/28/18  1:29 PM.  If you have any questions, ask your nurse or doctor.               These medicines have changed or have updated prescriptions.        Dose/Directions    zolpidem 5 MG tablet   Commonly known as:  AMBIEN   This may have changed:  See the new instructions.   Used for:  Primary insomnia   Changed by:  Evan Giles MD        Dose:  5 mg   Take 1 tablet (5 mg) by mouth At Bedtime   Quantity:  30 tablet   Refills:  5            Where to get your medicines      Some of these will need a paper prescription and others can be bought over the counter.  Ask your nurse if you have questions.     Bring a paper prescription for each of these medications     zolpidem 5 MG tablet                Primary Care Provider Office Phone # Fax #    Evan Giles [phone] [address] [facility] [address]        Equal Access to Services     FADY LEZAMA " AH: Jose R staffordkayliesera Darrell, waaxda luqadaha, qaybta kajulio gamboa, christophe celia radhaarslan chaudharyaprilmichelle mcgee. So [facility] [phone].    ATENCIÓN: Si habla español, tiene a noel disposición servicios gratuitos de asistencia lingüística. Llame al [phone].    We comply with applicable federal civil rights laws and [place] laws. We do not discriminate on the basis of race, color, national origin, age, disability, sex, sexual orientation, or gender identity.            Thank you!     Thank you for choosing [facility]  for your care. Our goal is always to provide you with excellent care. Hearing back from our patients is one way we can continue to improve our services. Please take a few minutes to complete the written survey that you may receive in the mail after your visit with us. Thank you!             Your Updated Medication List - Protect others around you: Learn how to safely use, store and throw away your medicines at www.disposemymeds.org.          This list is accurate as of 11/28/18  1:29 PM.  Always use your most recent med list.                   Brand Name Dispense Instructions for use Diagnosis    acetaminophen 500 MG tablet    TYLENOL     Take 1,000 mg by mouth every 6 hours as needed Max acetaminophen dose: 4000 mg in 24 hours        amLODIPine 5 MG tablet    NORVASC    90 tablet    Take 1 tablet (5 mg) by mouth daily    Essential hypertension       atenolol 100 MG tablet    TENORMIN    90 tablet    TAKE ONE TABLET BY MOUTH ONCE DAILY    Essential hypertension       CALCIUM 600+D PLUS MINERALS 600-400 MG-UNIT Tabs      Take 1 tablet by mouth daily with food        GOODSENSE ASPIRIN 325 MG tablet   Generic drug:  aspirin      Take 325 mg by mouth daily with food        hydrochlorothiazide 25 MG tablet    HYDRODIURIL    90 tablet    Take 1 tablet (25 mg) by mouth daily    Essential hypertension       levothyroxine 100 MCG tablet    SYNTHROID/LEVOTHROID    90 tablet     TAKE 1 TABLET BY MOUTH ONCE DAILY IN THE MORNING BEFORE BREAKFAST    Hypothyroidism       simvastatin 40 MG tablet    ZOCOR    90 tablet    Take 1 tablet (40 mg) by mouth At Bedtime    Hyperlipidemia, unspecified hyperlipidemia type       zolpidem 5 MG tablet    AMBIEN    30 tablet    Take 1 tablet (5 mg) by mouth At Bedtime    Primary insomnia

## 2018-11-29 ASSESSMENT — ANXIETY QUESTIONNAIRES: GAD7 TOTAL SCORE: 0

## 2018-11-30 NOTE — PROGRESS NOTES
GENOVEVA PAYAN    83 Y old Female, : 1935    Account Number: [id number]    [address], [place]    Home: [phone]     Guarantor: GENOVEVA PAYAN Insurance: [facility] [employer] PRIME SOLUTION Payer ID: [id number]   PCP: Evan Giles MD    Appointment Facility: [facility]      2018 Bentley Abdi MD     Reason for Appointment   1. HEARING EVALUATION   2. Hearing loss     History of Present Illness   HPI:   The patient is an 83-year-old female with a known sensorineural hearing loss. She had been fitted with hearing aids through our office in the past. She has lost her right hearing aid, which was her better functioning aid. Her left hearing aid works only intermittently. She feels quite lost without her functioning hearing aids and is here today for replacement.     Examination   General Examination:  The external auditory canals and TMs are clear bilaterally   Remainder of the head neck exam is unremarkable   Audiogram-stable moderately severe sensorineural hearing loss.       Assessments     1. Sensorineural hearing loss (SNHL) of both ears - H90.3 (Primary)     Treatment   1. Others   Notes: Patient was counseled that her hearing loss is stable and she was returned to audiology proceed with hearing aid fitting. There is no medical contraindication to hearing aid use.  Procedures  [ ].      Follow Up   prn   Electronically signed by BENTLEY ABDI MD on 2018 at 09:26 AM CST Sign off status: Completed    [facility]  [address] [place] [address]  Tel: [phone]  Fax:           Patient: GENOVEVA PAYAN : 1935 Progress Note: Bentley Abdi MD 2018      Note generated by TalkTo EMR/PM Software ([email or web address])

## 2018-12-06 ENCOUNTER — TELEPHONE (OUTPATIENT)
Dept: INTERNAL MEDICINE | Facility: OTHER | Age: 83
End: 2018-12-06

## 2018-12-06 NOTE — TELEPHONE ENCOUNTER
Spoke with [facility] pharmacy and she states patient's amlodipine has not been recalled. Attempted to reach patient and Left message to call back....................  12/6/2018   10:19 AM    Naima Boogie LPN...................12/6/2018  10:19 AM

## 2018-12-06 NOTE — TELEPHONE ENCOUNTER
Spoke to patient and she wants to see what Dr. Giles says about the Amlodipine.  Carlee Patterson LPN .......12/6/2018 1:52 PM

## 2018-12-10 NOTE — TELEPHONE ENCOUNTER
The patient was contacted and given the information below. She reported she would follow up with her pharmacy.  Maday So LPN on 12/10/2018 at 10:28 AM

## 2018-12-14 ENCOUNTER — TRANSFERRED RECORDS (OUTPATIENT)
Dept: HEALTH INFORMATION MANAGEMENT | Facility: OTHER | Age: 83
End: 2018-12-14

## 2018-12-18 ENCOUNTER — APPOINTMENT (OUTPATIENT)
Dept: OTOLARYNGOLOGY | Facility: OTHER | Age: 83
End: 2018-12-18
Attending: OTOLARYNGOLOGY
Payer: MEDICARE

## 2019-01-15 ENCOUNTER — APPOINTMENT (OUTPATIENT)
Dept: OTOLARYNGOLOGY | Facility: OTHER | Age: 84
End: 2019-01-15
Attending: OTOLARYNGOLOGY
Payer: MEDICARE

## 2019-01-18 DIAGNOSIS — I10 ESSENTIAL HYPERTENSION: ICD-10-CM

## 2019-01-18 RX ORDER — ATENOLOL 100 MG/1
TABLET ORAL
Qty: 90 TABLET | Refills: 1 | Status: SHIPPED | OUTPATIENT
Start: 2019-01-18 | End: 2019-05-15

## 2019-01-18 NOTE — TELEPHONE ENCOUNTER
Refill request for Atenolol.  Last office visit 11/28/18, last refill 7/23/18.  Prescription refilled per RN Medication Refill Policy..................Ana Patrick 1/18/2019 8:57 AM

## 2019-01-24 ENCOUNTER — APPOINTMENT (OUTPATIENT)
Dept: CT IMAGING | Facility: OTHER | Age: 84
End: 2019-01-24
Payer: MEDICARE

## 2019-01-24 ENCOUNTER — HOSPITAL ENCOUNTER (EMERGENCY)
Facility: OTHER | Age: 84
Discharge: HOME | End: 2019-01-25
Attending: FAMILY MEDICINE | Admitting: FAMILY MEDICINE
Payer: MEDICARE

## 2019-01-24 DIAGNOSIS — R19.7 DIARRHEA, UNSPECIFIED TYPE: ICD-10-CM

## 2019-01-24 DIAGNOSIS — K52.9 GASTROENTERITIS: ICD-10-CM

## 2019-01-24 PROCEDURE — 93005 ELECTROCARDIOGRAM TRACING: CPT | Performed by: FAMILY MEDICINE

## 2019-01-24 PROCEDURE — 85025 COMPLETE CBC W/AUTO DIFF WBC: CPT | Performed by: FAMILY MEDICINE

## 2019-01-24 PROCEDURE — 83690 ASSAY OF LIPASE: CPT | Performed by: FAMILY MEDICINE

## 2019-01-24 PROCEDURE — 74177 CT ABD & PELVIS W/CONTRAST: CPT | Mod: TC

## 2019-01-24 PROCEDURE — 84484 ASSAY OF TROPONIN QUANT: CPT | Performed by: FAMILY MEDICINE

## 2019-01-24 PROCEDURE — 36415 COLL VENOUS BLD VENIPUNCTURE: CPT | Performed by: FAMILY MEDICINE

## 2019-01-24 PROCEDURE — 99285 EMERGENCY DEPT VISIT HI MDM: CPT | Mod: 25 | Performed by: FAMILY MEDICINE

## 2019-01-24 PROCEDURE — 80053 COMPREHEN METABOLIC PANEL: CPT | Performed by: FAMILY MEDICINE

## 2019-01-24 PROCEDURE — 93010 ELECTROCARDIOGRAM REPORT: CPT | Performed by: INTERNAL MEDICINE

## 2019-01-24 PROCEDURE — 99284 EMERGENCY DEPT VISIT MOD MDM: CPT | Mod: Z6 | Performed by: FAMILY MEDICINE

## 2019-01-24 NOTE — ED AVS SNAPSHOT
[facility]  [address]  [place] [address]  Phone:  [phone]  Fax:  [phone]                                    Bea Flores   MRN: [id number]    Department:  [facility]   Date of Visit:  1/24/2019           After Visit Summary Signature Page    I have received my discharge instructions, and my questions have been answered. I have discussed any challenges I see with this plan with the nurse or doctor.    ..........................................................................................................................................  Patient/Patient Representative Signature      ..........................................................................................................................................  Patient Representative Print Name and Relationship to Patient    ..................................................               ................................................  Date                                   Time    ..........................................................................................................................................  Reviewed by Signature/Title    ...................................................              ..............................................  Date                                               Time          22EPIC Rev 08/18

## 2019-01-25 VITALS
HEART RATE: 81 BPM | DIASTOLIC BLOOD PRESSURE: 73 MMHG | OXYGEN SATURATION: 91 % | TEMPERATURE: 98 F | SYSTOLIC BLOOD PRESSURE: 131 MMHG | RESPIRATION RATE: 19 BRPM

## 2019-01-25 LAB
ALBUMIN SERPL-MCNC: 4.4 G/DL (ref 3.5–5.7)
ALP SERPL-CCNC: 16 U/L (ref 34–104)
ALT SERPL W P-5'-P-CCNC: 23 U/L (ref 7–52)
ANION GAP SERPL CALCULATED.3IONS-SCNC: 9 MMOL/L (ref 3–14)
AST SERPL W P-5'-P-CCNC: 23 U/L (ref 13–39)
BILIRUB SERPL-MCNC: 0.8 MG/DL (ref 0.3–1)
BUN SERPL-MCNC: 21 MG/DL (ref 7–25)
CALCIUM SERPL-MCNC: 9.7 MG/DL (ref 8.6–10.3)
CHLORIDE SERPL-SCNC: 99 MMOL/L (ref 98–107)
CO2 SERPL-SCNC: 25 MMOL/L (ref 21–31)
CREAT SERPL-MCNC: 0.85 MG/DL (ref 0.6–1.2)
DIFFERENTIAL METHOD BLD: ABNORMAL
ERYTHROCYTE [DISTWIDTH] IN BLOOD BY AUTOMATED COUNT: 12.9 % (ref 10–15)
GFR SERPL CREATININE-BSD FRML MDRD: 64 ML/MIN/{1.73_M2}
GLUCOSE SERPL-MCNC: 138 MG/DL (ref 70–105)
HCT VFR BLD AUTO: 43.9 % (ref 35–47)
HGB BLD-MCNC: 15.2 G/DL (ref 11.7–15.7)
LIPASE SERPL-CCNC: 17 U/L (ref 11–82)
MCH RBC QN AUTO: 31.9 PG (ref 26.5–33)
MCHC RBC AUTO-ENTMCNC: 34.6 G/DL (ref 31.5–36.5)
MCV RBC AUTO: 92 FL (ref 78–100)
PLATELET # BLD AUTO: 200 10E9/L (ref 150–450)
POTASSIUM SERPL-SCNC: 3.2 MMOL/L (ref 3.5–5.1)
PROT SERPL-MCNC: 7 G/DL (ref 6.4–8.9)
RBC # BLD AUTO: 4.76 10E12/L (ref 3.8–5.2)
SODIUM SERPL-SCNC: 133 MMOL/L (ref 134–144)
TROPONIN I SERPL-MCNC: <0.03 UG/L (ref 0–0.03)
TROPONIN I SERPL-MCNC: <0.03 UG/L (ref 0–0.03)
WBC # BLD AUTO: 13 10E9/L (ref 4–11)

## 2019-01-25 PROCEDURE — 84484 ASSAY OF TROPONIN QUANT: CPT | Performed by: FAMILY MEDICINE

## 2019-01-25 PROCEDURE — A9270 NON-COVERED ITEM OR SERVICE: HCPCS | Mod: GY | Performed by: FAMILY MEDICINE

## 2019-01-25 PROCEDURE — 36415 COLL VENOUS BLD VENIPUNCTURE: CPT | Performed by: FAMILY MEDICINE

## 2019-01-25 PROCEDURE — 25500064 ZZH RX 255 OP 636: Performed by: FAMILY MEDICINE

## 2019-01-25 PROCEDURE — 25000132 ZZH RX MED GY IP 250 OP 250 PS 637: Mod: GY | Performed by: FAMILY MEDICINE

## 2019-01-25 RX ORDER — POTASSIUM CHLORIDE 1500 MG/1
40 TABLET, EXTENDED RELEASE ORAL ONCE
Status: COMPLETED | OUTPATIENT
Start: 2019-01-25 | End: 2019-01-25

## 2019-01-25 RX ADMIN — IOHEXOL 100 ML: 350 INJECTION, SOLUTION INTRAVENOUS at 01:02

## 2019-01-25 RX ADMIN — POTASSIUM CHLORIDE 40 MEQ: 1500 TABLET, EXTENDED RELEASE ORAL at 02:46

## 2019-01-25 ASSESSMENT — ENCOUNTER SYMPTOMS
NAUSEA: 0
VOMITING: 0
MUSCULOSKELETAL NEGATIVE: 1
CONSTIPATION: 0
NEUROLOGICAL NEGATIVE: 1
SHORTNESS OF BREATH: 0
DIAPHORESIS: 1
ACTIVITY CHANGE: 0
ABDOMINAL PAIN: 1

## 2019-01-25 NOTE — ED PROVIDER NOTES
History     Chief Complaint   Patient presents with     Chest Pain     HPI  Bea Flores is a 83 year old female who presents to the emergency room with an episode of diarrhea that occurred around 7 PM and she noticed a sudden onset of stomach pain a little bit after that.  And she had an episode of severe epigastric pain where she started sweating and then had to feel like she had to bend over and that there was something stuck.  When she came to the ER she felt that if she could belch she would feel a lot better.  She describes the pain as being 8/10.    Allergies:  Allergies   Allergen Reactions     Trazodone Shortness Of Breath and Unknown     Codeine Unknown     Lisinopril Cough and Unknown     Pneumococcal 13-Bertha Conj Vacc Unknown       Problem List:    Patient Active Problem List    Diagnosis Date Noted     Osteoarthritis of spine with radiculopathy, lumbar region 03/01/2018     Priority: Medium     Insomnia 07/28/2015     Priority: Medium     Anxiety 03/16/2015     Priority: Medium     Transient global amnesia 02/27/2014     Priority: Medium     Vitamin D deficiency 03/12/2013     Priority: Medium     Cerebral artery occlusion with cerebral infarction (H) 02/29/2012     Priority: Medium     Hyperlipidemia 10/05/2010     Priority: Medium     Essential hypertension 10/05/2010     Priority: Medium     Hypothyroidism 10/05/2010     Priority: Medium        Past Medical History:    Past Medical History:   Diagnosis Date     Cerebral infarction (H)      Essential (primary) hypertension      Hyperlipidemia      Hypothyroidism      Migraine with aura and without status migrainosus, not intractable      Postmenopausal atrophic vaginitis      Sciatica of right side      Transient global amnesia      Varicose veins of other specified sites (CODE)        Past Surgical History:    Past Surgical History:   Procedure Laterality Date     BIOPSY BREAST Bilateral      COLONOSCOPY      11/06/06,Colonoscopy, next due in 2016.      EXTRACAPSULAR CATARACT EXTRATION WITH INTRAOCULAR LENS IMPLANT Bilateral     No Comments Provided     TONSILLECTOMY, ADENOIDECTOMY, COMBINED      No Comments Provided       Family History:    Family History   Problem Relation Age of Onset     Heart Disease Father         Heart Disease,MI, AAA     Other - See Comments Father         Stroke     Heart Disease Mother      Rheumatoid Arthritis Brother      Hypertension Brother      Aneurysm Brother      Breast Cancer Sister      Hypertension Sister      Hypertension Sister      Hypertension Sister      Diabetes Type 2  Sister      Hypertension Sister        Social History:  Marital Status:   [5]  Social History     Tobacco Use     Smoking status: Former Smoker     Types: Cigarettes     Last attempt to quit: 3/12/1990     Years since quittin.8     Smokeless tobacco: Never Used   Substance Use Topics     Alcohol use: Yes     Comment: occ wine     Drug use: No        Medications:      acetaminophen (TYLENOL) 500 MG tablet   amLODIPine (NORVASC) 5 MG tablet   aspirin (GOODSENSE ASPIRIN) 325 MG tablet   atenolol (TENORMIN) 100 MG tablet   Calcium Carbonate-Vit D-Min (CALCIUM 600+D PLUS MINERALS) 600-400 MG-UNIT TABS   hydrochlorothiazide (HYDRODIURIL) 25 MG tablet   levothyroxine (SYNTHROID/LEVOTHROID) 100 MCG tablet   simvastatin (ZOCOR) 40 MG tablet   zolpidem (AMBIEN) 5 MG tablet         Review of Systems   Constitutional: Positive for diaphoresis. Negative for activity change.   HENT: Negative.    Respiratory: Negative for shortness of breath.    Cardiovascular: Positive for chest pain.        Not sure if it was chest or abdomen   Gastrointestinal: Positive for abdominal pain. Negative for constipation, nausea and vomiting.        States she has chronic left sided pain   Genitourinary: Negative.    Musculoskeletal: Negative.    Neurological: Negative.        Physical Exam   BP: 116/90  Pulse: 82  Heart Rate: 80  Temp: 98  F (36.7  C)  Resp: 16  SpO2: 94  %      Physical Exam   Constitutional: She is oriented to person, place, and time. She appears well-developed and well-nourished. No distress.   Appears her stated age   HENT:   Head: Normocephalic and atraumatic.   Eyes: EOM are normal. Pupils are equal, round, and reactive to light.   Neck: Normal range of motion. Neck supple. No thyromegaly present.   Cardiovascular: Normal rate, regular rhythm and normal heart sounds.   Pulmonary/Chest: Effort normal and breath sounds normal. No stridor. No respiratory distress. She has no wheezes. She has no rales.   Abdominal: Soft. Bowel sounds are normal. She exhibits no distension and no mass. There is tenderness. There is no guarding.   Mild to moderate tenderness in the LLQ. NO other obvious abnormalities are noted.    Musculoskeletal: Normal range of motion. She exhibits no edema or tenderness.   Lymphadenopathy:     She has no cervical adenopathy.   Neurological: She is alert and oriented to person, place, and time.   Skin: Skin is warm and dry. Capillary refill takes less than 2 seconds. She is not diaphoretic.   Nursing note and vitals reviewed.      ED Course   Patient seen and examined. Labs ordered.EKG reviewed. CT scan abdomen ordered.   ED Course as of Jan 25 0243 Fri Jan 25, 2019   0212 WBC: (!) 13.0   0212 Potassium: (!) 3.2     Procedures               EKG Interpretation:      Interpreted by Romina Cali  Time reviewed: 23:30  Symptoms at time of EKG: chest/abdominal pain   Rhythm: normal sinus   Rate: normal  Axis: normal  Ectopy: none  Conduction: normal  ST Segments/ T Waves: No ST-T wave changes  Q Waves: none  Comparison to prior: Unchanged from 2014    Clinical Impression: normal EKG          Critical Care time:  none       Results for orders placed or performed during the hospital encounter of 01/24/19 (from the past 24 hour(s))   CBC with platelets differential   Result Value Ref Range    WBC 13.0 (H) 4.0 - 11.0 10e9/L    RBC Count 4.76 3.8 - 5.2  10e12/L    Hemoglobin 15.2 11.7 - 15.7 g/dL    Hematocrit 43.9 35.0 - 47.0 %    MCV 92 78 - 100 fl    MCH 31.9 26.5 - 33.0 pg    MCHC 34.6 31.5 - 36.5 g/dL    RDW 12.9 10.0 - 15.0 %    Platelet Count 200 150 - 450 10e9/L    Diff Method Automated Method    Comprehensive metabolic panel   Result Value Ref Range    Sodium 133 (L) 134 - 144 mmol/L    Potassium 3.2 (L) 3.5 - 5.1 mmol/L    Chloride 99 98 - 107 mmol/L    Carbon Dioxide 25 21 - 31 mmol/L    Anion Gap 9 3 - 14 mmol/L    Glucose 138 (H) 70 - 105 mg/dL    Urea Nitrogen 21 7 - 25 mg/dL    Creatinine 0.85 0.60 - 1.20 mg/dL    GFR Estimate 64 >60 mL/min/[1.73_m2]    GFR Estimate If Black 77 >60 mL/min/[1.73_m2]    Calcium 9.7 8.6 - 10.3 mg/dL    Bilirubin Total 0.8 0.3 - 1.0 mg/dL    Albumin 4.4 3.5 - 5.7 g/dL    Protein Total 7.0 6.4 - 8.9 g/dL    Alkaline Phosphatase 16 (L) 34 - 104 U/L    ALT 23 7 - 52 U/L    AST 23 13 - 39 U/L   Lipase   Result Value Ref Range    Lipase 17 11 - 82 U/L   Troponin I   Result Value Ref Range    Troponin I ES <0.030 0.000 - 0.034 ug/L   CT Abdomen Pelvis w Contrast    Narrative    EXAM:    CT Abdomen and Pelvis With Contrast     EXAM DATE/TIME:    1/25/2019 12:50 AM     CLINICAL HISTORY:    83 years old, female; Pain; Abdominal pain; Localized; Left lower quadrant   (llq); Additional info: Abd distension, llq pain     TECHNIQUE:    Axial computed tomography images of the abdomen and pelvis with intravenous   contrast.    All CT scans at this facility use at least one of these dose optimization   techniques: automated exposure control; mA and/or kV adjustment per patient   size (includes targeted exams where dose is matched to clinical indication); or   iterative reconstruction.    Coronal and sagittal reformatted images were created and reviewed.     CONTRAST:    100 ml of Omni 350 administered intravenously.     COMPARISON:    No relevant prior studies available.     FINDINGS:    Lower thorax: No acute findings.     ABDOMEN:     Liver:  In the inferior aspect of the right liver lobe is a focal hypodensity.   It measures approximately 1 cm in AP length and 1 cm in width. Ms. Peguero at 2   further characterize.   Gallbladder and bile ducts: Normal. No calcified stones. No ductal dilation.    Pancreas: Normal. No ductal dilation.    Spleen:  The spleen is of normal size and appearance.    Adrenals: Normal. No mass.    Kidneys and ureters:  The kidneys are of normal size. Multiple renal cortical   cysts. Largest cyst is in the inferior pole right kidney which measures 1.2 cm   in maximum length. Multiple calyceal diverticuli or pelvic cysts involving left   kidney. No hydronephrosis or nephrolithiasis.    Stomach and bowel:  No bowel obstruction. Few colonic diverticuli. No evidence   of acute diverticulitis. The transverse colon, descending colon and sigmoid   colon are collapsed. Minimal thickening of the wall.    Appendix: No evidence of appendicitis.     PELVIS:    Bladder: Unremarkable as visualized.    Reproductive: Unremarkable as visualized.     ABDOMEN and PELVIS:    Intraperitoneal space:  No free abdominal fluid or air.    Bones/joints: No acute fracture. No dislocation.    Soft tissues: Unremarkable.    Vasculature: Normal. No abdominal aortic aneurysm.    Lymph nodes: Normal. No enlarged lymph nodes.       Impression    IMPRESSION:   1. No evidence of bowel obstruction.   2. Portions of the transverse colon, descending colon and sigmoid colon are   collapsed with minimal thickening of the wall. This could represent an early   colitis. No mesenteric inflammation.     COMMENT:   Consistent with the American College of Radiology's Incidental Findings   Committee Report (J Am Cristofer Radiol 2010): Unless the patient's specific   circumstances suggest otherwise, any liver lesion 0.5 cm or less, any cystic   kidney lesion less than 1.0 cm, and/or any adrenal lesion 1.0 cm or less not   otherwise characterized in this report as possessing  suspicious or   indeterminate imaging features is/are highly likely to be benign and do not   require follow-up imaging or biopsy.     THIS DOCUMENT HAS BEEN ELECTRONICALLY SIGNED BY SANDRA DOYLE MD   Troponin I (second draw)   Result Value Ref Range    Troponin I ES <0.030 0.000 - 0.034 ug/L       Medications   potassium chloride ER (K-DUR/KLOR-CON M) CR tablet 40 mEq (not administered)   iohexol (OMNIPAQUE) 350 mg/mL solution 100 mL (100 mLs Intravenous Given 1/25/19 0102)       Assessments & Plan (with Medical Decision Making)     I have reviewed the nursing notes.    I have reviewed the findings, diagnosis, plan and need for follow up with the patient.  Discussed results with patient - there may some evidence of early  Inflammation of her colon which would be consistent with her symptoms. She is feeling better now. Her WBC is mildly elevated, but she understands that antibiotics may actually make her symptoms worse and would like to hold off on antibiotics for now.     Her cardiac enzymes were negative x 2 and she is not having any pain at present to suggest cardiac issues.     She will follow the recommended diet for gastroenteritis and followup if she has worsening of her symptoms.        Medication List      There are no discharge medications for this visit.         Final diagnoses:   Gastroenteritis   Diarrhea, unspecified type       1/24/2019   [facility] CenterRomina jolley MD  01/25/19 1122

## 2019-01-25 NOTE — ED TRIAGE NOTES
Pt states she has pain under left breast, unable to relax tonight, unable to lay down.  Pt states she has had diarrhea, abdominal pain, was diaphoretic.  Pt brought to [place] [facility], placed on cardiac monitor, EKG completed.

## 2019-02-12 ENCOUNTER — APPOINTMENT (OUTPATIENT)
Dept: OTOLARYNGOLOGY | Facility: OTHER | Age: 84
End: 2019-02-12
Attending: INTERNAL MEDICINE
Payer: MEDICARE

## 2019-03-05 ENCOUNTER — APPOINTMENT (OUTPATIENT)
Dept: OTOLARYNGOLOGY | Facility: OTHER | Age: 84
End: 2019-03-05
Attending: NURSE PRACTITIONER
Payer: MEDICARE

## 2019-03-19 ENCOUNTER — OFFICE VISIT (OUTPATIENT)
Dept: INTERNAL MEDICINE | Facility: OTHER | Age: 84
End: 2019-03-19
Attending: INTERNAL MEDICINE
Payer: MEDICARE

## 2019-03-19 VITALS
WEIGHT: 125 LBS | SYSTOLIC BLOOD PRESSURE: 148 MMHG | TEMPERATURE: 97.6 F | BODY MASS INDEX: 20.64 KG/M2 | DIASTOLIC BLOOD PRESSURE: 78 MMHG | RESPIRATION RATE: 18 BRPM | HEART RATE: 64 BPM

## 2019-03-19 DIAGNOSIS — L08.9 INFECTED CYST OF SKIN: Primary | ICD-10-CM

## 2019-03-19 DIAGNOSIS — G47.62 NOCTURNAL LEG CRAMPS: ICD-10-CM

## 2019-03-19 DIAGNOSIS — E87.6 HYPOKALEMIA: ICD-10-CM

## 2019-03-19 DIAGNOSIS — L72.9 INFECTED CYST OF SKIN: Primary | ICD-10-CM

## 2019-03-19 PROCEDURE — G0463 HOSPITAL OUTPT CLINIC VISIT: HCPCS

## 2019-03-19 PROCEDURE — G0463 HOSPITAL OUTPT CLINIC VISIT: HCPCS | Mod: 25

## 2019-03-19 PROCEDURE — 10060 I&D ABSCESS SIMPLE/SINGLE: CPT | Performed by: INTERNAL MEDICINE

## 2019-03-19 PROCEDURE — 99213 OFFICE O/P EST LOW 20 MIN: CPT | Mod: 25 | Performed by: INTERNAL MEDICINE

## 2019-03-19 ASSESSMENT — ENCOUNTER SYMPTOMS
EYES NEGATIVE: 1
ALLERGIC/IMMUNOLOGIC NEGATIVE: 1
ENDOCRINE NEGATIVE: 1
CONSTITUTIONAL NEGATIVE: 1

## 2019-03-19 ASSESSMENT — PATIENT HEALTH QUESTIONNAIRE - PHQ9: SUM OF ALL RESPONSES TO PHQ QUESTIONS 1-9: 0

## 2019-03-19 NOTE — NURSING NOTE
"The patient is here today to have a spot looked at on the right side of her face by her ear.  Maday So LPN on 3/19/2019 at 1:42 PM  Chief Complaint   Patient presents with     Derm Problem       Initial /78 (BP Location: Right arm, Patient Position: Sitting, Cuff Size: Adult Regular)   Pulse 64   Temp 97.6  F (36.4  C) (Tympanic)   Resp 18   Wt 56.7 kg (125 lb)   Breastfeeding? No   BMI 20.64 kg/m   Estimated body mass index is 20.64 kg/m  as calculated from the following:    Height as of 11/28/18: 1.657 m (5' 5.25\").    Weight as of this encounter: 56.7 kg (125 lb).  Medication Reconciliation: complete    Maday So LPN    "

## 2019-03-19 NOTE — PROGRESS NOTES
Chief Complaint:  Spot on face.    HPI: She is in today with a couple of things.  First of all she has a spot in front of her right ear that is irritated and sore and seems to be growing.  Historically, she was able to squeeze this area and get some white thick material from it.  She is trying to squeeze it now and nothing will come out.  She is here to have this removed and checked.    She is also had some leg cramps.  She had some lab earlier this year when she was in the emergency room and her potassium was slightly low.  I recommended that she try a potassium supplement to see if that would help.    She also has a little bit of foot drop.  This has occurred ever since she had her back problem.  She just mentioned it in passing and really does not want anything further investigated at this time.    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     EXTRACAPSULAR CATARACT EXTRATION WITH INTRAOCULAR LENS IMPLANT Bilateral     No Comments Provided     TONSILLECTOMY, ADENOIDECTOMY, COMBINED      No Comments Provided       Allergies   Allergen Reactions     Trazodone Shortness Of Breath and Unknown     Codeine Unknown     Lisinopril Cough and Unknown     Pneumococcal 13-Bertha Conj Vacc Unknown       Current Outpatient Medications    Medication Sig Dispense Refill     acetaminophen (TYLENOL) 500 MG tablet Take 1,000 mg by mouth every 6 hours as needed Max acetaminophen dose: 4000 mg in 24 hours       amLODIPine (NORVASC) 5 MG tablet Take 1 tablet (5 mg) by mouth daily 90 tablet 3     aspirin (GOODSENSE ASPIRIN) 325 MG tablet Take 325 mg by mouth daily with food       atenolol (TENORMIN) 100 MG tablet TAKE 1 TABLET BY MOUTH ONCE DAILY 90 tablet 1     Calcium Carbonate-Vit D-Min (CALCIUM 600+D PLUS MINERALS) 600-400 MG-UNIT TABS Take 1 tablet by mouth daily with food       hydrochlorothiazide (HYDRODIURIL) 25 MG tablet Take 1 tablet (25 mg) by mouth daily 90 tablet 3     levothyroxine (SYNTHROID/LEVOTHROID) 100 MCG tablet TAKE 1 TABLET BY MOUTH ONCE DAILY IN THE MORNING BEFORE BREAKFAST 90 tablet 2     simvastatin (ZOCOR) 40 MG tablet Take 1 tablet (40 mg) by mouth At Bedtime 90 tablet 3     zolpidem (AMBIEN) 5 MG tablet Take 1 tablet (5 mg) by mouth At Bedtime 30 tablet 5       Review of Systems   Constitutional: Negative.    Eyes: Negative.    Endocrine: Negative.    Allergic/Immunologic: Negative.        Physical Exam   Constitutional: She appears well-developed and well-nourished. No distress.   HENT:   Head:       Skin: She is not diaphoretic.   Nursing note and vitals reviewed.      Assessment:        ICD-10-CM    1. Infected cyst of skin L72.9 DRAIN SKIN ABSCESS SIMPLE/SINGLE    L08.9    2. Hypokalemia E87.6    3. Nocturnal leg cramps G47.62        Plan: Reviewed the situation with the patient.  I elected to incise and drain this area after sterile prep.  The skin was anesthetized with liquid nitrogen and an 11 blade was used to incise and a moderate amount of purulence and cyst lining material was removed.  She will follow-up on this as needed.  As mentioned, suggest potassium supplements for her leg cramps, we will recheck this when she returns in June for her complete evaluation.

## 2019-04-06 DIAGNOSIS — I10 ESSENTIAL HYPERTENSION: ICD-10-CM

## 2019-04-09 RX ORDER — AMLODIPINE BESYLATE 5 MG/1
TABLET ORAL
Qty: 90 TABLET | Refills: 0 | Status: SHIPPED | OUTPATIENT
Start: 2019-04-09 | End: 2019-05-15

## 2019-04-09 NOTE — TELEPHONE ENCOUNTER
Prescription approved per [facility] Refill Protocol.  LOV: 3/19/19 patient to follow up in June for complete eval.    Judy Alvarado RN on 4/9/2019 at 3:23 PM

## 2019-04-18 DIAGNOSIS — E78.5 HYPERLIPIDEMIA, UNSPECIFIED HYPERLIPIDEMIA TYPE: ICD-10-CM

## 2019-04-22 RX ORDER — SIMVASTATIN 40 MG
TABLET ORAL
Qty: 90 TABLET | Refills: 0 | Status: SHIPPED | OUTPATIENT
Start: 2019-04-22 | End: 2019-05-15

## 2019-04-22 NOTE — TELEPHONE ENCOUNTER
"Prescription approved per [facility] Refill Protocol.  LOV: 3/19/19  Per LOV note  \" we will recheck this when she returns in June for her complete evaluation.\"    Judy Alvarado RN on 4/22/2019 at 12:12 PM      "

## 2019-04-30 ENCOUNTER — APPOINTMENT (OUTPATIENT)
Dept: OTOLARYNGOLOGY | Facility: OTHER | Age: 84
End: 2019-04-30
Attending: OTOLARYNGOLOGY
Payer: MEDICARE

## 2019-05-15 ENCOUNTER — OFFICE VISIT (OUTPATIENT)
Dept: INTERNAL MEDICINE | Facility: OTHER | Age: 84
End: 2019-05-15
Attending: INTERNAL MEDICINE
Payer: COMMERCIAL

## 2019-05-15 VITALS
SYSTOLIC BLOOD PRESSURE: 138 MMHG | WEIGHT: 122.4 LBS | HEIGHT: 65 IN | TEMPERATURE: 97.2 F | RESPIRATION RATE: 18 BRPM | BODY MASS INDEX: 20.39 KG/M2 | DIASTOLIC BLOOD PRESSURE: 74 MMHG | HEART RATE: 64 BPM

## 2019-05-15 DIAGNOSIS — M47.26 OSTEOARTHRITIS OF SPINE WITH RADICULOPATHY, LUMBAR REGION: ICD-10-CM

## 2019-05-15 DIAGNOSIS — Z12.31 VISIT FOR SCREENING MAMMOGRAM: ICD-10-CM

## 2019-05-15 DIAGNOSIS — I10 ESSENTIAL HYPERTENSION: ICD-10-CM

## 2019-05-15 DIAGNOSIS — I63.50 CEREBRAL ARTERY OCCLUSION WITH CEREBRAL INFARCTION (H): Primary | ICD-10-CM

## 2019-05-15 DIAGNOSIS — E03.9 HYPOTHYROIDISM, UNSPECIFIED TYPE: ICD-10-CM

## 2019-05-15 DIAGNOSIS — F51.01 PRIMARY INSOMNIA: ICD-10-CM

## 2019-05-15 DIAGNOSIS — E78.5 HYPERLIPIDEMIA, UNSPECIFIED HYPERLIPIDEMIA TYPE: ICD-10-CM

## 2019-05-15 PROCEDURE — 99215 OFFICE O/P EST HI 40 MIN: CPT | Performed by: INTERNAL MEDICINE

## 2019-05-15 PROCEDURE — G0463 HOSPITAL OUTPT CLINIC VISIT: HCPCS

## 2019-05-15 RX ORDER — AMLODIPINE BESYLATE 5 MG/1
5 TABLET ORAL DAILY
Qty: 90 TABLET | Refills: 3 | Status: SHIPPED | OUTPATIENT
Start: 2019-05-15 | End: 2020-06-26

## 2019-05-15 RX ORDER — ATENOLOL 100 MG/1
100 TABLET ORAL DAILY
Qty: 90 TABLET | Refills: 3 | Status: SHIPPED | OUTPATIENT
Start: 2019-05-15 | End: 2020-06-26

## 2019-05-15 RX ORDER — ZOLPIDEM TARTRATE 5 MG/1
5 TABLET ORAL AT BEDTIME
Qty: 30 TABLET | Refills: 5 | Status: SHIPPED | OUTPATIENT
Start: 2019-05-15 | End: 2019-10-04

## 2019-05-15 RX ORDER — LEVOTHYROXINE SODIUM 100 UG/1
100 TABLET ORAL DAILY
Qty: 90 TABLET | Refills: 3 | Status: SHIPPED | OUTPATIENT
Start: 2019-05-15 | End: 2020-06-17

## 2019-05-15 RX ORDER — HYDROCHLOROTHIAZIDE 25 MG/1
25 TABLET ORAL DAILY
Qty: 90 TABLET | Refills: 3 | Status: SHIPPED | OUTPATIENT
Start: 2019-05-15 | End: 2020-06-23

## 2019-05-15 RX ORDER — SIMVASTATIN 40 MG
40 TABLET ORAL AT BEDTIME
Qty: 90 TABLET | Refills: 3 | Status: SHIPPED | OUTPATIENT
Start: 2019-05-15 | End: 2020-06-26

## 2019-05-15 SDOH — HEALTH STABILITY: MENTAL HEALTH: HOW OFTEN DO YOU HAVE A DRINK CONTAINING ALCOHOL?: 2-4 TIMES A MONTH

## 2019-05-15 SDOH — HEALTH STABILITY: MENTAL HEALTH: HOW MANY STANDARD DRINKS CONTAINING ALCOHOL DO YOU HAVE ON A TYPICAL DAY?: 1 OR 2

## 2019-05-15 ASSESSMENT — ENCOUNTER SYMPTOMS
SINUS PRESSURE: 0
HEMATURIA: 0
EYE REDNESS: 0
HEADACHES: 0
SINUS PAIN: 0
BACK PAIN: 0
AGITATION: 0
FREQUENCY: 0
VOMITING: 0
SHORTNESS OF BREATH: 0
DIFFICULTY URINATING: 0
NUMBNESS: 0
EYE PAIN: 0
DYSURIA: 0
SORE THROAT: 0
SEIZURES: 0
TREMORS: 0
CONSTIPATION: 0
SLEEP DISTURBANCE: 0
CHEST TIGHTNESS: 0
NECK PAIN: 0
CONFUSION: 0
DIZZINESS: 0
FLANK PAIN: 0
PALPITATIONS: 0
TROUBLE SWALLOWING: 0
CHILLS: 0
RHINORRHEA: 0
FEVER: 0
HALLUCINATIONS: 0
ABDOMINAL PAIN: 0
COUGH: 0
BLOOD IN STOOL: 0
DIAPHORESIS: 0
NECK STIFFNESS: 0
WHEEZING: 0
ADENOPATHY: 0
NAUSEA: 0
JOINT SWELLING: 0
BRUISES/BLEEDS EASILY: 0
DIARRHEA: 0
ABDOMINAL DISTENTION: 0
FACIAL SWELLING: 0
FATIGUE: 0
WEAKNESS: 0

## 2019-05-15 ASSESSMENT — PATIENT HEALTH QUESTIONNAIRE - PHQ9: SUM OF ALL RESPONSES TO PHQ QUESTIONS 1-9: 0

## 2019-05-15 ASSESSMENT — MIFFLIN-ST. JEOR: SCORE: 1016.08

## 2019-05-15 NOTE — PROGRESS NOTES
Chief Complaint:  This patient is here for a comprehensive review of their multiple medical problems, renewal of medications and update on necessary health maintenance issues.      HPI: She is here today for complete evaluation.  She feels well in most respects.  She does have a history of a cerebral infarction about 10 years ago with vision loss but no residual.  She also has a history of hypertension.  She is treated with multidrug therapy with good control of her blood pressure.  She does not have any end organ disease otherwise as result of her hypertension.    She has a history of hyperlipidemia.  She is on moderate intensity statin therapy with good control.  She has a history of hypothyroidism and is on replacement therapy.  She does not have any symptoms of under or over replacement.    She does have a history of osteoarthritis including some low back problems.  This is not bothering her much in regards to sciatica but she is having generalized osteoarthritis.  She quit working this winter and did not do much activity and she certainly has paid the price with that.    She has chronic insomnia.  She takes Ambien on a regular basis.  I reviewed with her the dangers of this medication but she says it is the only thing that helps her sleep and she wants to continue with it.    Medications are reconciled.  Past medical history, past surgical history, family history and social history are all reviewed and updated.  She does not up-to-date with immunizations.  She is due for mammogram and lab work.    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     Essential (primary) hypertension     No Comments Provided     Hyperlipidemia     No Comments Provided     Hypothyroidism     No Comments Provided     Migraine with aura and without status migrainosus, not intractable     No Comments Provided     Postmenopausal atrophic vaginitis     No Comments Provided      Sciatica of right side     Intermittent right sciatica     Transient global amnesia     2/27/2014     Varicose veins of other specified sites (CODE)     No Comments Provided       Past Surgical History:   Procedure Laterality Date     BIOPSY BREAST Bilateral      COLONOSCOPY      11/06/06,Colonoscopy, next due in 2016.     EXTRACAPSULAR CATARACT EXTRATION WITH INTRAOCULAR LENS IMPLANT Bilateral     No Comments Provided     TONSILLECTOMY, ADENOIDECTOMY, COMBINED      No Comments Provided       Current Outpatient Medications   Medication Sig Dispense Refill     acetaminophen (TYLENOL) 500 MG tablet Take 1,000 mg by mouth every 6 hours as needed Max acetaminophen dose: 4000 mg in 24 hours       amLODIPine (NORVASC) 5 MG tablet TAKE 1 TABLET BY MOUTH ONCE DAILY 90 tablet 0     aspirin (GOODSENSE ASPIRIN) 325 MG tablet Take 325 mg by mouth daily with food       atenolol (TENORMIN) 100 MG tablet TAKE 1 TABLET BY MOUTH ONCE DAILY 90 tablet 1     Calcium Carbonate-Vit D-Min (CALCIUM 600+D PLUS MINERALS) 600-400 MG-UNIT TABS Take 1 tablet by mouth daily with food       hydrochlorothiazide (HYDRODIURIL) 25 MG tablet TAKE 1 TABLET BY MOUTH ONCE DAILY 90 tablet 3     levothyroxine (SYNTHROID/LEVOTHROID) 100 MCG tablet TAKE 1 TABLET BY MOUTH ONCE DAILY IN THE MORNING BEFORE BREAKFAST 90 tablet 3     simvastatin (ZOCOR) 40 MG tablet TAKE 1 TABLET BY MOUTH AT BEDTIME 90 tablet 0     zolpidem (AMBIEN) 5 MG tablet Take 1 tablet (5 mg) by mouth At Bedtime 30 tablet 5       Allergies   Allergen Reactions     Trazodone Shortness Of Breath and Unknown     Codeine Unknown     Lisinopril Cough and Unknown     Pneumococcal 13-Bertha Conj Vacc Unknown       Family History   Problem Relation Age of Onset     Heart Disease Father         Heart Disease,MI, AAA     Other - See Comments Father         Stroke     Heart Disease Mother      Rheumatoid Arthritis Brother      Hypertension Brother      Aneurysm Brother      Breast Cancer Sister       Hypertension Sister      Hypertension Sister      Hypertension Sister      Diabetes Type 2  Sister      Hypertension Sister        Social History     Socioeconomic History     Marital status:      Spouse name: Not on file     Number of children: Not on file     Years of education: Not on file     Highest education level: Not on file   Occupational History     Not on file   Social Needs     Financial resource strain: Not on file     Food insecurity:     Worry: Not on file     Inability: Not on file     Transportation needs:     Medical: Not on file     Non-medical: Not on file   Tobacco Use     Smoking status: Former Smoker     Types: Cigarettes     Last attempt to quit: 3/12/1990     Years since quittin.1     Smokeless tobacco: Never Used   Substance and Sexual Activity     Alcohol use: Yes     Frequency: 2-4 times a month     Drinks per session: 1 or 2     Comment: occ wine     Drug use: No     Sexual activity: Not Currently   Lifestyle     Physical activity:     Days per week: Not on file     Minutes per session: Not on file     Stress: Not on file   Relationships     Social connections:     Talks on phone: Not on file     Gets together: Not on file     Attends Baptism service: Not on file     Active member of club or organization: Not on file     Attends meetings of clubs or organizations: Not on file     Relationship status: Not on file     Intimate partner violence:     Fear of current or ex partner: Not on file     Emotionally abused: Not on file     Physically abused: Not on file     Forced sexual activity: Not on file   Other Topics Concern     Parent/sibling w/ CABG, MI or angioplasty before 65F 55M? Not Asked   Social History Narrative    .    of heart disease.  She is a retired  and travels with her daughter who lives in [place].  Retired from [employer].  Lives in town.  Two children.       Review of Systems   Constitutional: Negative for chills, diaphoresis,  fatigue and fever.   HENT: Negative for congestion, ear pain, facial swelling, mouth sores, nosebleeds, rhinorrhea, sinus pressure, sinus pain, sore throat and trouble swallowing.    Eyes: Negative for pain, redness and visual disturbance.   Respiratory: Negative for cough, chest tightness, shortness of breath and wheezing.    Cardiovascular: Negative for chest pain, palpitations and leg swelling.   Gastrointestinal: Negative for abdominal distention, abdominal pain, blood in stool, constipation, diarrhea, nausea and vomiting.   Endocrine: Negative for cold intolerance and heat intolerance.   Genitourinary: Negative for difficulty urinating, dysuria, flank pain, frequency, hematuria, pelvic pain, vaginal bleeding, vaginal discharge and vaginal pain.   Musculoskeletal: Negative for back pain, gait problem, joint swelling, neck pain and neck stiffness.   Skin: Negative for rash.   Neurological: Negative for dizziness, tremors, seizures, syncope, weakness, numbness and headaches.   Hematological: Negative for adenopathy. Does not bruise/bleed easily.   Psychiatric/Behavioral: Negative for agitation, confusion, hallucinations and sleep disturbance.       Physical Exam   Constitutional: She is oriented to person, place, and time. She appears well-developed and well-nourished. No distress.   HENT:   Head: Normocephalic.   Right Ear: External ear normal.   Left Ear: External ear normal.   Mouth/Throat: Oropharynx is clear and moist. No oropharyngeal exudate.   Eyes: Pupils are equal, round, and reactive to light. Conjunctivae are normal.   Neck: Normal range of motion. Neck supple. Normal carotid pulses and no JVD present. Carotid bruit is not present. No tracheal deviation present. No thyromegaly present.   Cardiovascular: Normal rate, regular rhythm and normal heart sounds. Exam reveals no gallop and no friction rub.   No murmur heard.  Pulmonary/Chest: Effort normal and breath sounds normal. No respiratory distress. She  has no wheezes. She has no rales. Right breast exhibits no inverted nipple, no mass, no nipple discharge, no skin change and no tenderness. Left breast exhibits no inverted nipple, no mass, no nipple discharge, no skin change and no tenderness.   Abdominal: Soft. Bowel sounds are normal. She exhibits no distension and no mass. There is no tenderness. There is no rebound.   Musculoskeletal: Normal range of motion. She exhibits no edema.   Lymphadenopathy:     She has no cervical adenopathy.   Neurological: She is alert and oriented to person, place, and time. She has normal reflexes. No cranial nerve deficit. Coordination normal.   Skin: Skin is warm and dry. No rash noted. She is not diaphoretic.   Psychiatric: She has a normal mood and affect. Her behavior is normal.   Nursing note and vitals reviewed.      Assessment:      ICD-10-CM    1. Cerebral artery occlusion with cerebral infarction (H) I63.50 CBC W PLT No Diff   2. Hypothyroidism, unspecified type E03.9 TSH     T4, Free   3. Hyperlipidemia, unspecified hyperlipidemia type E78.5 Comprehensive Metabolic Panel     Lipid Panel   4. Essential hypertension I10    5. Primary insomnia F51.01    6. 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  Medications will continue without change including the Ambien.  She is aware of the potential risks of this medication as discussed.  Complete lab will be done when she is fasting, I will send her a letter with the results.  Mammogram scheduled.  She will continue on at least a half aspirin daily because of her previous history of TIA or stroke.  Everything else looks fine.  Assuming all goes well, follow-up annually or sooner if there other problems or concerns.  I encouraged increased regular exercise and activity.

## 2019-05-15 NOTE — NURSING NOTE
"The patient is here today to be seen for a refill on her medications.  Maday So LPN on 5/15/2019 at 2:58 PM  Chief Complaint   Patient presents with     Recheck Medication       Initial /74 (BP Location: Right arm, Patient Position: Sitting, Cuff Size: Adult Regular)   Pulse 64   Temp 97.2  F (36.2  C) (Tympanic)   Resp 18   Ht 1.651 m (5' 5\")   Wt 55.5 kg (122 lb 6.4 oz)   Breastfeeding? No   BMI 20.37 kg/m   Estimated body mass index is 20.37 kg/m  as calculated from the following:    Height as of this encounter: 1.651 m (5' 5\").    Weight as of this encounter: 55.5 kg (122 lb 6.4 oz).  Medication Reconciliation: complete    Maday So LPN    "

## 2019-05-16 DIAGNOSIS — I63.50 CEREBRAL ARTERY OCCLUSION WITH CEREBRAL INFARCTION (H): ICD-10-CM

## 2019-05-16 DIAGNOSIS — E03.9 HYPOTHYROIDISM, UNSPECIFIED TYPE: ICD-10-CM

## 2019-05-16 DIAGNOSIS — E78.5 HYPERLIPIDEMIA, UNSPECIFIED HYPERLIPIDEMIA TYPE: ICD-10-CM

## 2019-05-16 LAB
ALBUMIN SERPL-MCNC: 4.4 G/DL (ref 3.5–5.7)
ALP SERPL-CCNC: 16 U/L (ref 34–104)
ALT SERPL W P-5'-P-CCNC: 22 U/L (ref 7–52)
ANION GAP SERPL CALCULATED.3IONS-SCNC: 5 MMOL/L (ref 3–14)
AST SERPL W P-5'-P-CCNC: 23 U/L (ref 13–39)
BILIRUB SERPL-MCNC: 0.7 MG/DL (ref 0.3–1)
BUN SERPL-MCNC: 22 MG/DL (ref 7–25)
CALCIUM SERPL-MCNC: 9.8 MG/DL (ref 8.6–10.3)
CHLORIDE SERPL-SCNC: 100 MMOL/L (ref 98–107)
CHOLEST SERPL-MCNC: 153 MG/DL
CO2 SERPL-SCNC: 32 MMOL/L (ref 21–31)
CREAT SERPL-MCNC: 0.92 MG/DL (ref 0.6–1.2)
ERYTHROCYTE [DISTWIDTH] IN BLOOD BY AUTOMATED COUNT: 12.9 % (ref 10–15)
GFR SERPL CREATININE-BSD FRML MDRD: 58 ML/MIN/{1.73_M2}
GLUCOSE SERPL-MCNC: 101 MG/DL (ref 70–105)
HCT VFR BLD AUTO: 41.5 % (ref 35–47)
HDLC SERPL-MCNC: 44 MG/DL (ref 23–92)
HGB BLD-MCNC: 14.2 G/DL (ref 11.7–15.7)
LDLC SERPL CALC-MCNC: 89 MG/DL
MCH RBC QN AUTO: 32.6 PG (ref 26.5–33)
MCHC RBC AUTO-ENTMCNC: 34.2 G/DL (ref 31.5–36.5)
MCV RBC AUTO: 95 FL (ref 78–100)
NONHDLC SERPL-MCNC: 109 MG/DL
PLATELET # BLD AUTO: 206 10E9/L (ref 150–450)
POTASSIUM SERPL-SCNC: 4 MMOL/L (ref 3.5–5.1)
PROT SERPL-MCNC: 6.9 G/DL (ref 6.4–8.9)
RBC # BLD AUTO: 4.35 10E12/L (ref 3.8–5.2)
SODIUM SERPL-SCNC: 137 MMOL/L (ref 134–144)
T4 FREE SERPL-MCNC: 1.08 NG/DL (ref 0.6–1.6)
TRIGL SERPL-MCNC: 101 MG/DL
TSH SERPL DL<=0.05 MIU/L-ACNC: 0.97 IU/ML (ref 0.34–5.6)
WBC # BLD AUTO: 5.5 10E9/L (ref 4–11)

## 2019-05-16 PROCEDURE — 80061 LIPID PANEL: CPT | Performed by: INTERNAL MEDICINE

## 2019-05-16 PROCEDURE — 84443 ASSAY THYROID STIM HORMONE: CPT | Performed by: INTERNAL MEDICINE

## 2019-05-16 PROCEDURE — 84439 ASSAY OF FREE THYROXINE: CPT | Performed by: INTERNAL MEDICINE

## 2019-05-16 PROCEDURE — 80053 COMPREHEN METABOLIC PANEL: CPT | Performed by: INTERNAL MEDICINE

## 2019-05-16 PROCEDURE — 85027 COMPLETE CBC AUTOMATED: CPT | Performed by: INTERNAL MEDICINE

## 2019-05-16 PROCEDURE — 36415 COLL VENOUS BLD VENIPUNCTURE: CPT | Performed by: INTERNAL MEDICINE

## 2019-05-30 ENCOUNTER — HOSPITAL ENCOUNTER (OUTPATIENT)
Dept: MAMMOGRAPHY | Facility: OTHER | Age: 84
Discharge: HOME | End: 2019-05-30
Attending: INTERNAL MEDICINE | Admitting: INTERNAL MEDICINE
Payer: MEDICARE

## 2019-05-30 DIAGNOSIS — Z12.31 VISIT FOR SCREENING MAMMOGRAM: ICD-10-CM

## 2019-05-30 PROCEDURE — 77063 BREAST TOMOSYNTHESIS BI: CPT

## 2019-05-31 ENCOUNTER — HOSPITAL ENCOUNTER (OUTPATIENT)
Dept: ULTRASOUND IMAGING | Facility: OTHER | Age: 84
Discharge: HOME | End: 2019-05-31
Attending: INTERNAL MEDICINE | Admitting: INTERNAL MEDICINE
Payer: MEDICARE

## 2019-05-31 ENCOUNTER — HOSPITAL ENCOUNTER (OUTPATIENT)
Dept: MAMMOGRAPHY | Facility: OTHER | Age: 84
End: 2019-05-31
Attending: INTERNAL MEDICINE
Payer: MEDICARE

## 2019-05-31 DIAGNOSIS — R92.8 ABNORMAL MAMMOGRAM: ICD-10-CM

## 2019-05-31 PROCEDURE — 76642 ULTRASOUND BREAST LIMITED: CPT | Mod: RT

## 2019-05-31 PROCEDURE — G0279 TOMOSYNTHESIS, MAMMO: HCPCS

## 2019-08-13 ENCOUNTER — APPOINTMENT (OUTPATIENT)
Dept: OTOLARYNGOLOGY | Facility: OTHER | Age: 84
End: 2019-08-13
Attending: OTOLARYNGOLOGY
Payer: MEDICARE

## 2019-10-04 ENCOUNTER — OFFICE VISIT (OUTPATIENT)
Dept: INTERNAL MEDICINE | Facility: OTHER | Age: 84
End: 2019-10-04
Attending: INTERNAL MEDICINE
Payer: COMMERCIAL

## 2019-10-04 VITALS
SYSTOLIC BLOOD PRESSURE: 128 MMHG | WEIGHT: 122.8 LBS | HEART RATE: 72 BPM | RESPIRATION RATE: 18 BRPM | BODY MASS INDEX: 20.43 KG/M2 | TEMPERATURE: 97.1 F | DIASTOLIC BLOOD PRESSURE: 76 MMHG

## 2019-10-04 DIAGNOSIS — F51.01 PRIMARY INSOMNIA: Primary | ICD-10-CM

## 2019-10-04 PROCEDURE — G0463 HOSPITAL OUTPT CLINIC VISIT: HCPCS

## 2019-10-04 PROCEDURE — 99213 OFFICE O/P EST LOW 20 MIN: CPT | Performed by: INTERNAL MEDICINE

## 2019-10-04 RX ORDER — LORAZEPAM 1 MG/1
1 TABLET ORAL
Qty: 20 TABLET | Refills: 0 | Status: SHIPPED | OUTPATIENT
Start: 2019-10-04 | End: 2019-10-22

## 2019-10-04 ASSESSMENT — ENCOUNTER SYMPTOMS
CONSTITUTIONAL NEGATIVE: 1
EYES NEGATIVE: 1
ENDOCRINE NEGATIVE: 1
ALLERGIC/IMMUNOLOGIC NEGATIVE: 1

## 2019-10-04 NOTE — PROGRESS NOTES
Chief Complaint: Insomnia.    HPI: This patient comes in today with a complaint of insomnia.  She tells me that she is just not able to sleep.  She had been taking Ambien but that was not even helping.  She quit the Ambien about 3 weeks ago and has not been able to sleep very well since.  She has been on trazodone in the past with significant side effects.  Benadryl does not agree with her either.  She is here today wondering what her other options are in regards to assisting her with sleep.    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     EXTRACAPSULAR CATARACT EXTRATION WITH INTRAOCULAR LENS IMPLANT Bilateral     No Comments Provided     TONSILLECTOMY, ADENOIDECTOMY, COMBINED      No Comments Provided       Allergies   Allergen Reactions     Trazodone Shortness Of Breath and Unknown     Codeine Unknown     Lisinopril Cough and Unknown     Pneumococcal 13-Bertha Conj Vacc Unknown       Current Outpatient Medications   Medication Sig Dispense Refill     acetaminophen (TYLENOL) 500 MG tablet Take 1,000 mg by mouth every 6 hours as needed Max acetaminophen dose: 4000 mg in 24 hours       amLODIPine (NORVASC) 5 MG tablet Take 1 tablet (5 mg) by mouth daily 90 tablet 3     aspirin (GOODSENSE ASPIRIN) 325 MG tablet Take 325 mg by mouth  daily with food       atenolol (TENORMIN) 100 MG tablet Take 1 tablet (100 mg) by mouth daily 90 tablet 3     Calcium Carbonate-Vit D-Min (CALCIUM 600+D PLUS MINERALS) 600-400 MG-UNIT TABS Take 1 tablet by mouth daily with food       hydrochlorothiazide (HYDRODIURIL) 25 MG tablet Take 1 tablet (25 mg) by mouth daily 90 tablet 3     levothyroxine (SYNTHROID/LEVOTHROID) 100 MCG tablet Take 1 tablet (100 mcg) by mouth daily 90 tablet 3     LORazepam (ATIVAN) 1 MG tablet Take 1 tablet (1 mg) by mouth nightly as needed for anxiety 20 tablet 0     simvastatin (ZOCOR) 40 MG tablet Take 1 tablet (40 mg) by mouth At Bedtime 90 tablet 3       Review of Systems   Constitutional: Negative.    Eyes: Negative.    Endocrine: Negative.    Allergic/Immunologic: Negative.        Physical Exam  Vitals signs and nursing note reviewed.   Constitutional:       General: She is not in acute distress.     Appearance: Normal appearance. She is not ill-appearing, toxic-appearing or diaphoretic.   Neurological:      Mental Status: She is alert.         Assessment:        ICD-10-CM    1. Primary insomnia F51.01 LORazepam (ATIVAN) 1 MG tablet       Plan: Discontinue the Ambien.  Trial of lorazepam 1 mg 1/2-1 nightly.  I will have her back in 2 weeks for recheck to see how she is doing with this.  Consider adding melatonin to this regimen if needed.  I think the lorazepam will be more effective as a lot of her difficulties with sleeping are related to her anxious and hyperactive mood.

## 2019-10-04 NOTE — NURSING NOTE
"The patient is here today to be seen to discuss her sleep concerns.  Maday So LPN on 10/4/2019 at 3:13 PM  Chief Complaint   Patient presents with     Sleep Problem       Initial /76 (BP Location: Right arm, Patient Position: Sitting, Cuff Size: Adult Large)   Pulse 72   Temp 97.1  F (36.2  C) (Tympanic)   Resp 18   Wt 55.7 kg (122 lb 12.8 oz)   Breastfeeding? No   BMI 20.43 kg/m   Estimated body mass index is 20.43 kg/m  as calculated from the following:    Height as of 5/15/19: 1.651 m (5' 5\").    Weight as of this encounter: 55.7 kg (122 lb 12.8 oz).  Medication Reconciliation: complete    Maday So LPN    "

## 2019-10-22 ENCOUNTER — OFFICE VISIT (OUTPATIENT)
Dept: INTERNAL MEDICINE | Facility: OTHER | Age: 84
End: 2019-10-22
Attending: INTERNAL MEDICINE
Payer: MEDICARE

## 2019-10-22 VITALS
WEIGHT: 124.6 LBS | TEMPERATURE: 97.4 F | BODY MASS INDEX: 20.73 KG/M2 | DIASTOLIC BLOOD PRESSURE: 84 MMHG | HEART RATE: 64 BPM | SYSTOLIC BLOOD PRESSURE: 134 MMHG | RESPIRATION RATE: 20 BRPM

## 2019-10-22 DIAGNOSIS — F51.01 PRIMARY INSOMNIA: ICD-10-CM

## 2019-10-22 PROCEDURE — 99213 OFFICE O/P EST LOW 20 MIN: CPT | Performed by: INTERNAL MEDICINE

## 2019-10-22 PROCEDURE — G0463 HOSPITAL OUTPT CLINIC VISIT: HCPCS | Mod: 25

## 2019-10-22 PROCEDURE — G0463 HOSPITAL OUTPT CLINIC VISIT: HCPCS

## 2019-10-22 PROCEDURE — 90686 IIV4 VACC NO PRSV 0.5 ML IM: CPT

## 2019-10-22 PROCEDURE — G0008 ADMIN INFLUENZA VIRUS VAC: HCPCS

## 2019-10-22 RX ORDER — LORAZEPAM 1 MG/1
1.5 TABLET ORAL
Qty: 45 TABLET | Refills: 5 | Status: SHIPPED | OUTPATIENT
Start: 2019-10-22 | End: 2019-12-18

## 2019-10-22 RX ORDER — ZOLPIDEM TARTRATE 5 MG/1
TABLET ORAL
COMMUNITY
Start: 2019-10-22 | End: 2019-12-30

## 2019-10-22 ASSESSMENT — ENCOUNTER SYMPTOMS
CONSTITUTIONAL NEGATIVE: 1
EYES NEGATIVE: 1
ENDOCRINE NEGATIVE: 1
ALLERGIC/IMMUNOLOGIC NEGATIVE: 1

## 2019-10-22 ASSESSMENT — PAIN SCALES - GENERAL: PAINLEVEL: NO PAIN (0)

## 2019-10-22 NOTE — PROGRESS NOTES
Chief Complaint: Follow-up on insomnia.    HPI: She is here for follow-up on her insomnia.  See previous note.  She was not sleeping well with the Ambien 5 mg.  She wanted to try something else.  She was intolerant of a number of other medications so I put her on lorazepam 1 mg.  She finds that 1 mg is not sufficient but with 1.5 mg, she sleeps throughout the night without any difficulty and without any side effects.  She is happy with the medication and would like to continue it.  She would like to still have some 5 mg Ambien tablets on hand to use when she travels.  She would prefer to use the Ambien in place of the lorazepam.    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     EXTRACAPSULAR CATARACT EXTRATION WITH INTRAOCULAR LENS IMPLANT Bilateral     No Comments Provided     TONSILLECTOMY, ADENOIDECTOMY, COMBINED      No Comments Provided       Allergies   Allergen Reactions     Trazodone Shortness Of Breath and Unknown     Codeine Unknown     Lisinopril Cough and Unknown     Pneumococcal 13-Bertha Conj Vacc Unknown       Current Outpatient Medications   Medication Sig Dispense Refill     acetaminophen (TYLENOL) 500 MG tablet Take 1,000 mg by mouth every 6 hours as needed Max acetaminophen dose: 4000 mg in 24 hours       amLODIPine  (NORVASC) 5 MG tablet Take 1 tablet (5 mg) by mouth daily 90 tablet 3     aspirin (GOODSENSE ASPIRIN) 325 MG tablet Take 325 mg by mouth daily with food       atenolol (TENORMIN) 100 MG tablet Take 1 tablet (100 mg) by mouth daily 90 tablet 3     Calcium Carbonate-Vit D-Min (CALCIUM 600+D PLUS MINERALS) 600-400 MG-UNIT TABS Take 1 tablet by mouth daily with food       hydrochlorothiazide (HYDRODIURIL) 25 MG tablet Take 1 tablet (25 mg) by mouth daily 90 tablet 3     levothyroxine (SYNTHROID/LEVOTHROID) 100 MCG tablet Take 1 tablet (100 mcg) by mouth daily 90 tablet 3     LORazepam (ATIVAN) 1 MG tablet Take 1.5 tablets (1.5 mg) by mouth nightly as needed for anxiety 45 tablet 5     simvastatin (ZOCOR) 40 MG tablet Take 1 tablet (40 mg) by mouth At Bedtime 90 tablet 3     zolpidem (AMBIEN) 5 MG tablet One at bedtime prn when travelling--in place of the lorazepam         Review of Systems   Constitutional: Negative.    Eyes: Negative.    Endocrine: Negative.    Allergic/Immunologic: Negative.        Physical Exam  Vitals signs and nursing note reviewed.   Constitutional:       General: She is not in acute distress.     Appearance: Normal appearance. She is not ill-appearing, toxic-appearing or diaphoretic.   Neurological:      Mental Status: She is alert.         Assessment:        ICD-10-CM    1. Primary insomnia F51.01 LORazepam (ATIVAN) 1 MG tablet       Plan: She will continue on the lorazepam as needed for sleep.  She will use the Ambien in place of the lorazepam when she travels.  Flu shot given today.  Follow-up will be as needed.

## 2019-11-25 ENCOUNTER — NURSE TRIAGE (OUTPATIENT)
Dept: INTERNAL MEDICINE | Facility: OTHER | Age: 84
End: 2019-11-25

## 2019-11-25 NOTE — TELEPHONE ENCOUNTER
Contacted the patient and gave her the information below. The patient is wondering if the lorazepam could be causing her migraines. She has had two major migraines since starting this medication 10 days ago.  Maday So LPN on 11/25/2019 at 11:26 AM

## 2019-11-25 NOTE — TELEPHONE ENCOUNTER
I would be more concerned about a primary eye problem and suggest that she contact her eye doctor to see if they recommend that she be evaluated.

## 2019-11-25 NOTE — TELEPHONE ENCOUNTER
S-(situation): patient calling and states she had episode this morning of white spot in the center of her vision, states is lasted about 5-8 minutes    B-(background): patient  has a long history of migraines and usually gets aura symptom but usually only gets about every 6 months.  Patient  was started on new medication recently , Lorazepam and has been taking 1.5 tablet for the last 3 days.    A-(assessment): patient  about 10 days ago had a normal aura that she usually gets with migraine, rainbow arch last about 20 months.  States about 5 days ago was awaken from sleep due to phone call and had double vision for about 3-4 minutes.  States this morning had white spot in the center of her vision lasted about 5-8 minutes.  Denies any weakness, speech problems, states looked at face in mirror and smiled and it was normal or any other symptoms.      R-(recommendations): patient concerned as she has not had migraine symptoms this close together and the white spots in vision is not a symptom she gets with migraine. Patient wondering if could be from new medication , lorazepam or if she should be seen.  Patient denies any symptoms now  Judy Alvarado RN on 11/25/2019 at 11:11 AM

## 2019-11-25 NOTE — TELEPHONE ENCOUNTER
Patient is taking a new medication and ongoing for a month she is getting migrains. Wondering if the medication she is on is causing this.

## 2019-11-25 NOTE — TELEPHONE ENCOUNTER
It is possible but not a guarantee.  She could try substituting the Ambien in place of the lorazepam for a while to see what happens.

## 2019-12-10 ENCOUNTER — APPOINTMENT (OUTPATIENT)
Dept: OTOLARYNGOLOGY | Facility: OTHER | Age: 84
End: 2019-12-10
Attending: OTOLARYNGOLOGY
Payer: MEDICARE

## 2019-12-18 ENCOUNTER — OFFICE VISIT (OUTPATIENT)
Dept: INTERNAL MEDICINE | Facility: OTHER | Age: 84
End: 2019-12-18
Attending: INTERNAL MEDICINE
Payer: COMMERCIAL

## 2019-12-18 VITALS
HEART RATE: 60 BPM | WEIGHT: 124 LBS | TEMPERATURE: 97.2 F | RESPIRATION RATE: 18 BRPM | SYSTOLIC BLOOD PRESSURE: 122 MMHG | DIASTOLIC BLOOD PRESSURE: 68 MMHG | BODY MASS INDEX: 20.63 KG/M2

## 2019-12-18 DIAGNOSIS — F41.9 ANXIETY: Primary | ICD-10-CM

## 2019-12-18 PROCEDURE — 99213 OFFICE O/P EST LOW 20 MIN: CPT | Performed by: INTERNAL MEDICINE

## 2019-12-18 PROCEDURE — G0463 HOSPITAL OUTPT CLINIC VISIT: HCPCS

## 2019-12-18 ASSESSMENT — ANXIETY QUESTIONNAIRES
3. WORRYING TOO MUCH ABOUT DIFFERENT THINGS: NOT AT ALL
1. FEELING NERVOUS, ANXIOUS, OR ON EDGE: NEARLY EVERY DAY
7. FEELING AFRAID AS IF SOMETHING AWFUL MIGHT HAPPEN: NOT AT ALL
5. BEING SO RESTLESS THAT IT IS HARD TO SIT STILL: SEVERAL DAYS
IF YOU CHECKED OFF ANY PROBLEMS ON THIS QUESTIONNAIRE, HOW DIFFICULT HAVE THESE PROBLEMS MADE IT FOR YOU TO DO YOUR WORK, TAKE CARE OF THINGS AT HOME, OR GET ALONG WITH OTHER PEOPLE: NOT DIFFICULT AT ALL
2. NOT BEING ABLE TO STOP OR CONTROL WORRYING: MORE THAN HALF THE DAYS
GAD7 TOTAL SCORE: 9
6. BECOMING EASILY ANNOYED OR IRRITABLE: NOT AT ALL

## 2019-12-18 ASSESSMENT — ENCOUNTER SYMPTOMS
ALLERGIC/IMMUNOLOGIC NEGATIVE: 1
ENDOCRINE NEGATIVE: 1
EYES NEGATIVE: 1
CONSTITUTIONAL NEGATIVE: 1

## 2019-12-18 ASSESSMENT — PATIENT HEALTH QUESTIONNAIRE - PHQ9: 5. POOR APPETITE OR OVEREATING: NEARLY EVERY DAY

## 2019-12-18 NOTE — NURSING NOTE
"The patient is here today to have a follow up on anxiety.  Maday So LPN on 12/18/2019 at 3:10 PM  Chief Complaint   Patient presents with     Recheck Medication       Initial /68 (BP Location: Right arm, Patient Position: Sitting, Cuff Size: Adult Regular)   Pulse 60   Temp 97.2  F (36.2  C) (Tympanic)   Resp 18   Wt 56.2 kg (124 lb)   Breastfeeding No   BMI 20.63 kg/m   Estimated body mass index is 20.63 kg/m  as calculated from the following:    Height as of 5/15/19: 1.651 m (5' 5\").    Weight as of this encounter: 56.2 kg (124 lb).  Medication Reconciliation: complete    Maday So LPN    "

## 2019-12-18 NOTE — PROGRESS NOTES
Chief Complaint:  Anxiety issues.    HPI: She is in today for follow-up on her anxiety and her insomnia.  She had previously been on Ambien which was starting to lose its effectiveness as she was relying on it fairly heavily.  We then tried her on lorazepam which initially helped but after taking it for a while she started developing some side effects.  She is in today wondering what she can do and what her options are.  Most of her problems are probably related to anxiety at this time.  She had been on citalopram in the past without enough improvement but without side effects.  She does not tolerate trazodone.    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     EXTRACAPSULAR CATARACT EXTRATION WITH INTRAOCULAR LENS IMPLANT Bilateral     No Comments Provided     TONSILLECTOMY, ADENOIDECTOMY, COMBINED      No Comments Provided       Allergies   Allergen Reactions     Trazodone Shortness Of Breath and Unknown     Codeine Unknown     Lisinopril Cough and Unknown     Pneumococcal 13-Bertha Conj Vacc Unknown       Current Outpatient Medications   Medication Sig Dispense Refill     acetaminophen (TYLENOL) 500 MG tablet Take 1,000 mg by mouth every 6 hours as needed Max acetaminophen dose: 4000 mg in 24 hours       amLODIPine (NORVASC) 5 MG  tablet Take 1 tablet (5 mg) by mouth daily 90 tablet 3     aspirin (GOODSENSE ASPIRIN) 325 MG tablet Take 325 mg by mouth daily with food       atenolol (TENORMIN) 100 MG tablet Take 1 tablet (100 mg) by mouth daily 90 tablet 3     Calcium Carbonate-Vit D-Min (CALCIUM 600+D PLUS MINERALS) 600-400 MG-UNIT TABS Take 1 tablet by mouth daily with food       hydrochlorothiazide (HYDRODIURIL) 25 MG tablet Take 1 tablet (25 mg) by mouth daily 90 tablet 3     levothyroxine (SYNTHROID/LEVOTHROID) 100 MCG tablet Take 1 tablet (100 mcg) by mouth daily 90 tablet 3     sertraline (ZOLOFT) 50 MG tablet Take 1 tablet (50 mg) by mouth daily 30 tablet 1     simvastatin (ZOCOR) 40 MG tablet Take 1 tablet (40 mg) by mouth At Bedtime 90 tablet 3     zolpidem (AMBIEN) 5 MG tablet One at bedtime prn when travelling--in place of the lorazepam         Review of Systems   Constitutional: Negative.    Eyes: Negative.    Endocrine: Negative.    Allergic/Immunologic: Negative.        Physical Exam  Vitals signs and nursing note reviewed.   Constitutional:       General: She is not in acute distress.     Appearance: Normal appearance. She is not ill-appearing, toxic-appearing or diaphoretic.   Neurological:      Mental Status: She is alert.       LORI-7 SCORE 3/12/2018 11/28/2018 12/18/2019   Total Score 0 0 9       Assessment:        ICD-10-CM    1. Anxiety F41.9 sertraline (ZOLOFT) 50 MG tablet       Plan: I want her to try sertraline 50 mg.  She will start with 1/2 tablet daily for 4 days and then increase to whole tablet daily.  Discontinue the lorazepam.  Continue with the Ambien as needed.  Follow-up with me in 4 weeks for recheck and adjustment in dose as needed.  Could also consider BuSpar or similar if needed.

## 2019-12-19 ASSESSMENT — ANXIETY QUESTIONNAIRES: GAD7 TOTAL SCORE: 9

## 2019-12-30 DIAGNOSIS — F41.9 ANXIETY: Primary | ICD-10-CM

## 2019-12-30 RX ORDER — ZOLPIDEM TARTRATE 5 MG/1
TABLET ORAL
Qty: 30 TABLET | Refills: 1 | Status: SHIPPED | OUTPATIENT
Start: 2019-12-30 | End: 2020-02-17

## 2019-12-30 NOTE — TELEPHONE ENCOUNTER
Routing refill request to provider for review/approval because:  Drug not on the [facility] refill protocol   LOV: 12/18/19  Judy Alvarado RN on 12/30/2019 at 3:17 PM

## 2020-01-13 ENCOUNTER — OFFICE VISIT (OUTPATIENT)
Dept: INTERNAL MEDICINE | Facility: OTHER | Age: 85
End: 2020-01-13
Attending: INTERNAL MEDICINE
Payer: MEDICARE

## 2020-01-13 VITALS
WEIGHT: 122 LBS | TEMPERATURE: 96.2 F | HEART RATE: 60 BPM | BODY MASS INDEX: 19.61 KG/M2 | SYSTOLIC BLOOD PRESSURE: 142 MMHG | RESPIRATION RATE: 20 BRPM | DIASTOLIC BLOOD PRESSURE: 84 MMHG | HEIGHT: 66 IN

## 2020-01-13 DIAGNOSIS — R29.898 WEAKNESS OF RIGHT LOWER EXTREMITY: ICD-10-CM

## 2020-01-13 DIAGNOSIS — R29.90 STROKE-LIKE SYMPTOM: Primary | ICD-10-CM

## 2020-01-13 DIAGNOSIS — I49.9 IRREGULAR HEART BEAT: ICD-10-CM

## 2020-01-13 DIAGNOSIS — R79.89 LOW TSH LEVEL: ICD-10-CM

## 2020-01-13 DIAGNOSIS — I10 ESSENTIAL HYPERTENSION: ICD-10-CM

## 2020-01-13 DIAGNOSIS — E03.9 HYPOTHYROIDISM, UNSPECIFIED TYPE: ICD-10-CM

## 2020-01-13 LAB
ALBUMIN SERPL-MCNC: 4.8 G/DL (ref 3.5–5.7)
ALP SERPL-CCNC: 17 U/L (ref 34–104)
ALT SERPL W P-5'-P-CCNC: 18 U/L (ref 7–52)
ANION GAP SERPL CALCULATED.3IONS-SCNC: 5 MMOL/L (ref 3–14)
AST SERPL W P-5'-P-CCNC: 19 U/L (ref 13–39)
BILIRUB SERPL-MCNC: 0.9 MG/DL (ref 0.3–1)
BUN SERPL-MCNC: 11 MG/DL (ref 7–25)
CALCIUM SERPL-MCNC: 10.2 MG/DL (ref 8.6–10.3)
CHLORIDE SERPL-SCNC: 96 MMOL/L (ref 98–107)
CO2 SERPL-SCNC: 33 MMOL/L (ref 21–31)
CREAT SERPL-MCNC: 1.04 MG/DL (ref 0.6–1.2)
ERYTHROCYTE [DISTWIDTH] IN BLOOD BY AUTOMATED COUNT: 13 % (ref 10–15)
GFR SERPL CREATININE-BSD FRML MDRD: 51 ML/MIN/{1.73_M2}
GLUCOSE SERPL-MCNC: 97 MG/DL (ref 70–105)
HCT VFR BLD AUTO: 43.6 % (ref 35–47)
HGB BLD-MCNC: 15 G/DL (ref 11.7–15.7)
MAGNESIUM SERPL-MCNC: 2.4 MG/DL (ref 1.9–2.7)
MCH RBC QN AUTO: 32.9 PG (ref 26.5–33)
MCHC RBC AUTO-ENTMCNC: 34.4 G/DL (ref 31.5–36.5)
MCV RBC AUTO: 96 FL (ref 78–100)
PLATELET # BLD AUTO: 237 10E9/L (ref 150–450)
POTASSIUM SERPL-SCNC: 3.9 MMOL/L (ref 3.5–5.1)
PROT SERPL-MCNC: 7 G/DL (ref 6.4–8.9)
RBC # BLD AUTO: 4.56 10E12/L (ref 3.8–5.2)
SODIUM SERPL-SCNC: 134 MMOL/L (ref 134–144)
T4 FREE SERPL-MCNC: 1.22 NG/DL (ref 0.6–1.6)
TSH SERPL DL<=0.05 MIU/L-ACNC: 0.31 IU/ML (ref 0.34–5.6)
WBC # BLD AUTO: 6.2 10E9/L (ref 4–11)

## 2020-01-13 PROCEDURE — 36415 COLL VENOUS BLD VENIPUNCTURE: CPT | Mod: ZL | Performed by: INTERNAL MEDICINE

## 2020-01-13 PROCEDURE — 80053 COMPREHEN METABOLIC PANEL: CPT | Mod: ZL | Performed by: INTERNAL MEDICINE

## 2020-01-13 PROCEDURE — 83735 ASSAY OF MAGNESIUM: CPT | Mod: ZL | Performed by: INTERNAL MEDICINE

## 2020-01-13 PROCEDURE — 99214 OFFICE O/P EST MOD 30 MIN: CPT | Performed by: INTERNAL MEDICINE

## 2020-01-13 PROCEDURE — 84443 ASSAY THYROID STIM HORMONE: CPT | Mod: ZL | Performed by: INTERNAL MEDICINE

## 2020-01-13 PROCEDURE — 85027 COMPLETE CBC AUTOMATED: CPT | Mod: ZL | Performed by: INTERNAL MEDICINE

## 2020-01-13 PROCEDURE — G0463 HOSPITAL OUTPT CLINIC VISIT: HCPCS

## 2020-01-13 PROCEDURE — 93005 ELECTROCARDIOGRAM TRACING: CPT | Performed by: INTERNAL MEDICINE

## 2020-01-13 PROCEDURE — 93010 ELECTROCARDIOGRAM REPORT: CPT | Performed by: INTERNAL MEDICINE

## 2020-01-13 PROCEDURE — 84439 ASSAY OF FREE THYROXINE: CPT | Mod: ZL | Performed by: INTERNAL MEDICINE

## 2020-01-13 ASSESSMENT — ANXIETY QUESTIONNAIRES
1. FEELING NERVOUS, ANXIOUS, OR ON EDGE: NOT AT ALL
6. BECOMING EASILY ANNOYED OR IRRITABLE: NOT AT ALL
GAD7 TOTAL SCORE: 0
2. NOT BEING ABLE TO STOP OR CONTROL WORRYING: NOT AT ALL
IF YOU CHECKED OFF ANY PROBLEMS ON THIS QUESTIONNAIRE, HOW DIFFICULT HAVE THESE PROBLEMS MADE IT FOR YOU TO DO YOUR WORK, TAKE CARE OF THINGS AT HOME, OR GET ALONG WITH OTHER PEOPLE: NOT DIFFICULT AT ALL
3. WORRYING TOO MUCH ABOUT DIFFERENT THINGS: NOT AT ALL
7. FEELING AFRAID AS IF SOMETHING AWFUL MIGHT HAPPEN: NOT AT ALL
5. BEING SO RESTLESS THAT IT IS HARD TO SIT STILL: NOT AT ALL

## 2020-01-13 ASSESSMENT — PATIENT HEALTH QUESTIONNAIRE - PHQ9
SUM OF ALL RESPONSES TO PHQ QUESTIONS 1-9: 0
5. POOR APPETITE OR OVEREATING: NOT AT ALL

## 2020-01-13 ASSESSMENT — PAIN SCALES - GENERAL: PAINLEVEL: NO PAIN (0)

## 2020-01-13 ASSESSMENT — MIFFLIN-ST. JEOR: SCORE: 1021.17

## 2020-01-13 NOTE — PROGRESS NOTES
Chief Complaint   Patient presents with     Musculoskeletal Problem          Subjective:   Ms. Flores is a 84 year old female  seen for the acute concern today of concerns for stroke.    3 days ago she had brought in mail and tipped to her right and was unable to catch herself.  She couldn't move her leg back to a normal position.  She crawled to her bathroom and got a drink of water.  No memory issues or confusion.  She was very anxious at the time.  This lasted seconds to minutes.  She did not have LOC.  No head injury.  She was able to speak she believes.  She has a family history of stroke.  She was able to stick her tongue out and it appeared normal.  She reportedly had a stroke in 2009 with loss of vision.  Although brain imaging did not show any area of infarct.    She has a history of hypertension.  She is currently on hydrochlorothiazide, atenolol and amlodipine.  She is currently on a statin.    She has a history of hypothyroidism and is on levothyroxine.    She  reports that she quit smoking about 29 years ago. Her smoking use included cigarettes. She has never used smokeless tobacco.    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      ROS:   Pertinent  ROS was performed and was negative, including for fevers, chills, chest pain, shortness of breath, increased lower extremity edema, changes in bowel or bladder, blood in  "the stool, difficulty swallowing, sores in the mouth. No other concerns, with exception of HPI above.      Objective:    BP (!) 150/78 (BP Location: Right arm, Patient Position: Sitting, Cuff Size: Adult Regular)   Pulse 60   Temp 96.2  F (35.7  C) (Tympanic)   Resp 20   Ht 1.67 m (5' 5.75\")   Wt 55.3 kg (122 lb)   BMI 19.84 kg/m    GEN: Vitals reviewed.  Patient is in no acute distress. Cooperative with exam.  HEENT: Normocephalic atraumatic.  Pupils equally round.  No scleral icterus, no conjunctival erythema. Oropharynx with no erythema or exudates. Dentition adequate.  NECK: Supple; no thyromegaly.  No neck, cervical LAD.    CV: Heart irregular with normal rate with no murmur.   LUNGS: Lungs clear to auscultation bilaterally.  Chest rise equal bilaterally.  No accessory muscle use.  SKIN: Warm and dry to touch.  No rash on face, arms and legs.  EXT: No clubbing or cyanosis.  No peripheral edema.  NEURO: Alert and oriented to person, place, and time.  Cranial nerves: Pupils equal, round, reactive to light and accomodation. Visual fields full. Extraocular muscles full. Facial sensation to light touch normal. Facial strength symmetric. Hearing normal. Tongue and palate midline. Power of tongue normal. Shoulder shrug normal and symmetric.  SCM muscle tone normal.   Motor: Tone and strength normal and symmetric in distal and proximal BUE and BLE. No tremor.                Sensory: Normal and symmetric to light touch.  Cerebellar: Rapid alternating movements of hands and heel-tap intact are normal. Normal gait. Romberg negative    LABS: Personally ordered/reviewed  Results for orders placed or performed in visit on 01/13/20   Magnesium     Status: None   Result Value Ref Range    Magnesium 2.4 1.9 - 2.7 mg/dL   TSH Reflex GH     Status: Abnormal   Result Value Ref Range    TSH Reflex 0.31 (L) 0.34 - 5.60 IU/mL   CBC W PLT No Diff     Status: None   Result Value Ref Range    WBC 6.2 4.0 - 11.0 10e9/L    RBC Count " 4.56 3.8 - 5.2 10e12/L    Hemoglobin 15.0 11.7 - 15.7 g/dL    Hematocrit 43.6 35.0 - 47.0 %    MCV 96 78 - 100 fl    MCH 32.9 26.5 - 33.0 pg    MCHC 34.4 31.5 - 36.5 g/dL    RDW 13.0 10.0 - 15.0 %    Platelet Count 237 150 - 450 10e9/L   Comprehensive Metabolic Panel     Status: Abnormal   Result Value Ref Range    Sodium 134 134 - 144 mmol/L    Potassium 3.9 3.5 - 5.1 mmol/L    Chloride 96 (L) 98 - 107 mmol/L    Carbon Dioxide 33 (H) 21 - 31 mmol/L    Anion Gap 5 3 - 14 mmol/L    Glucose 97 70 - 105 mg/dL    Urea Nitrogen 11 7 - 25 mg/dL    Creatinine 1.04 0.60 - 1.20 mg/dL    GFR Estimate 51 (L) >60 mL/min/[1.73_m2]    GFR Estimate If Black 61 >60 mL/min/[1.73_m2]    Calcium 10.2 8.6 - 10.3 mg/dL    Bilirubin Total 0.9 0.3 - 1.0 mg/dL    Albumin 4.8 3.5 - 5.7 g/dL    Protein Total 7.0 6.4 - 8.9 g/dL    Alkaline Phosphatase 17 (L) 34 - 104 U/L    ALT 18 7 - 52 U/L    AST 19 13 - 39 U/L   T4, Free     Status: None   Result Value Ref Range    T4 Free 1.22 0.60 - 1.60 ng/dL             Assessment/Plan:   Stroke-like symptom  -Physical exam at this time is normal.  We discussed options and will plan for imaging of the brain to assess for any damage related to recent symptoms.  She should continue on her aspirin regularly.  We discussed possibly increasing her statin although her cholesterol is well controlled.  She was encouraged to work on blood pressure control as below.   - CBC W PLT No Diff  - Comprehensive Metabolic Panel  - MR Brain w/o & w Contrast    Weakness of right lower extremity  -Improved at this time.  Lab work shows overall normal results other than slightly low TSH.  - TSH Reflex GH  - MR Brain w/o & w Contrast    Essential hypertension  - Blood pressure today of (!) 142/84 is not at the goal of <140/90 with mild exacerbation.  - Continue current regimen.  Instructed to check BP at home.  - Cautioned patient to monitor with antibiotics, herbals and any OTC medications  - electrolytes and renal function  done and okay    Irregular heart beat  -Heart rate is slightly irregular today although likely reflects ectopic activity.  EKG obtained and does show sinus bradycardia with first-degree AV block.  Essentially unchanged from prior.  - EKG 12-lead, tracing only  - Magnesium  - TSH Reflex GH    Low TSH level  -TSH is slightly low.  T4 is normal.  Plan for recheck in 4 to 6 weeks.  - T4, Free  - TSH Reflex GH    Hypothyroidism, unspecified type  - T4, Free  - TSH Reflex GH      - Return/call as needed for follow-up should any new symptoms develop, for worsening of current symptoms or if symptoms do not resolve with above plan.    Return if symptoms worsen or fail to improve.     KARTHIKEYAN AMBROSIO,    1/13/2020 11:58 AM    This document was prepared using voice generated softwear. While every attempt was made for accuracy, grammatical errors may exist.

## 2020-01-13 NOTE — LETTER
January 14, 2020      Bea REGAN Mark  [address]  [place] [address]        Dear ,    We are writing to inform you of your test results.    Labs show that your thyroid is slightly high.  Before any changes are made I would recommend that we recheck this in 4-6 weeks.  This can be done with a lab only appointment.  I will watch for the results from the MRI and determine further testing based on this.  Please call with any questions.      Resulted Orders   Magnesium   Result Value Ref Range    Magnesium 2.4 1.9 - 2.7 mg/dL   TSH Reflex GH   Result Value Ref Range    TSH Reflex 0.31 (L) 0.34 - 5.60 IU/mL   CBC W PLT No Diff   Result Value Ref Range    WBC 6.2 4.0 - 11.0 10e9/L    RBC Count 4.56 3.8 - 5.2 10e12/L    Hemoglobin 15.0 11.7 - 15.7 g/dL    Hematocrit 43.6 35.0 - 47.0 %    MCV 96 78 - 100 fl    MCH 32.9 26.5 - 33.0 pg    MCHC 34.4 31.5 - 36.5 g/dL    RDW 13.0 10.0 - 15.0 %    Platelet Count 237 150 - 450 10e9/L   Comprehensive Metabolic Panel   Result Value Ref Range    Sodium 134 134 - 144 mmol/L    Potassium 3.9 3.5 - 5.1 mmol/L    Chloride 96 (L) 98 - 107 mmol/L    Carbon Dioxide 33 (H) 21 - 31 mmol/L    Anion Gap 5 3 - 14 mmol/L    Glucose 97 70 - 105 mg/dL    Urea Nitrogen 11 7 - 25 mg/dL    Creatinine 1.04 0.60 - 1.20 mg/dL    GFR Estimate 51 (L) >60 mL/min/[1.73_m2]    GFR Estimate If Black 61 >60 mL/min/[1.73_m2]    Calcium 10.2 8.6 - 10.3 mg/dL    Bilirubin Total 0.9 0.3 - 1.0 mg/dL    Albumin 4.8 3.5 - 5.7 g/dL    Protein Total 7.0 6.4 - 8.9 g/dL    Alkaline Phosphatase 17 (L) 34 - 104 U/L    ALT 18 7 - 52 U/L    AST 19 13 - 39 U/L   T4, Free   Result Value Ref Range    T4 Free 1.22 0.60 - 1.60 ng/dL       If you have any questions or concerns, please call the clinic at the number listed above.       Sincerely,        Jessica Ramirez, DO

## 2020-01-13 NOTE — NURSING NOTE
"Patient presents to the clinic for decrease in range of motion of the right leg-patient has concerns about possible stroke this past Friday.  See today's triage note.      Chief Complaint   Patient presents with     Musculoskeletal Problem       Initial BP (!) 150/78 (BP Location: Right arm, Patient Position: Sitting, Cuff Size: Adult Regular)   Pulse 60   Temp 96.2  F (35.7  C) (Tympanic)   Resp 20   Ht 1.67 m (5' 5.75\")   Wt 55.3 kg (122 lb)   BMI 19.84 kg/m   Estimated body mass index is 19.84 kg/m  as calculated from the following:    Height as of this encounter: 1.67 m (5' 5.75\").    Weight as of this encounter: 55.3 kg (122 lb).  Medication Reconciliation: complete    Carlee Covarrubias LPN    "

## 2020-01-14 ASSESSMENT — ANXIETY QUESTIONNAIRES: GAD7 TOTAL SCORE: 0

## 2020-01-15 ENCOUNTER — HOSPITAL ENCOUNTER (OUTPATIENT)
Dept: MRI IMAGING | Facility: OTHER | Age: 85
Discharge: HOME | End: 2020-01-15
Attending: INTERNAL MEDICINE | Admitting: INTERNAL MEDICINE
Payer: MEDICARE

## 2020-01-15 DIAGNOSIS — R29.898 WEAKNESS OF RIGHT LOWER EXTREMITY: ICD-10-CM

## 2020-01-15 DIAGNOSIS — R29.90 STROKE-LIKE SYMPTOM: ICD-10-CM

## 2020-01-15 PROCEDURE — 25500064 ZZH RX 255 OP 636: Performed by: INTERNAL MEDICINE

## 2020-01-15 PROCEDURE — 70553 MRI BRAIN STEM W/O & W/DYE: CPT

## 2020-01-15 PROCEDURE — A9575 INJ GADOTERATE MEGLUMI 0.1ML: HCPCS | Performed by: INTERNAL MEDICINE

## 2020-01-15 RX ORDER — GADOTERATE MEGLUMINE 376.9 MG/ML
15 INJECTION INTRAVENOUS ONCE
Status: COMPLETED | OUTPATIENT
Start: 2020-01-15 | End: 2020-01-15

## 2020-01-15 RX ADMIN — GADOTERATE MEGLUMINE 11 ML: 376.9 INJECTION INTRAVENOUS at 10:19

## 2020-01-27 ENCOUNTER — OFFICE VISIT (OUTPATIENT)
Dept: INTERNAL MEDICINE | Facility: OTHER | Age: 85
End: 2020-01-27
Attending: INTERNAL MEDICINE
Payer: COMMERCIAL

## 2020-01-27 VITALS
TEMPERATURE: 97.1 F | RESPIRATION RATE: 18 BRPM | BODY MASS INDEX: 20.07 KG/M2 | DIASTOLIC BLOOD PRESSURE: 80 MMHG | SYSTOLIC BLOOD PRESSURE: 132 MMHG | HEART RATE: 64 BPM | WEIGHT: 123.4 LBS

## 2020-01-27 DIAGNOSIS — F41.9 ANXIETY: ICD-10-CM

## 2020-01-27 DIAGNOSIS — G45.9 TIA (TRANSIENT ISCHEMIC ATTACK): ICD-10-CM

## 2020-01-27 DIAGNOSIS — R19.7 DIARRHEA, UNSPECIFIED TYPE: ICD-10-CM

## 2020-01-27 DIAGNOSIS — F51.01 PRIMARY INSOMNIA: Primary | ICD-10-CM

## 2020-01-27 PROCEDURE — G0463 HOSPITAL OUTPT CLINIC VISIT: HCPCS

## 2020-01-27 PROCEDURE — 99214 OFFICE O/P EST MOD 30 MIN: CPT | Performed by: INTERNAL MEDICINE

## 2020-01-27 ASSESSMENT — ANXIETY QUESTIONNAIRES
6. BECOMING EASILY ANNOYED OR IRRITABLE: NOT AT ALL
GAD7 TOTAL SCORE: 2
1. FEELING NERVOUS, ANXIOUS, OR ON EDGE: NOT AT ALL
2. NOT BEING ABLE TO STOP OR CONTROL WORRYING: SEVERAL DAYS
3. WORRYING TOO MUCH ABOUT DIFFERENT THINGS: NOT AT ALL
IF YOU CHECKED OFF ANY PROBLEMS ON THIS QUESTIONNAIRE, HOW DIFFICULT HAVE THESE PROBLEMS MADE IT FOR YOU TO DO YOUR WORK, TAKE CARE OF THINGS AT HOME, OR GET ALONG WITH OTHER PEOPLE: NOT DIFFICULT AT ALL
7. FEELING AFRAID AS IF SOMETHING AWFUL MIGHT HAPPEN: NOT AT ALL
5. BEING SO RESTLESS THAT IT IS HARD TO SIT STILL: SEVERAL DAYS

## 2020-01-27 ASSESSMENT — PATIENT HEALTH QUESTIONNAIRE - PHQ9
SUM OF ALL RESPONSES TO PHQ QUESTIONS 1-9: 0
5. POOR APPETITE OR OVEREATING: NOT AT ALL

## 2020-01-27 ASSESSMENT — ENCOUNTER SYMPTOMS
CONSTITUTIONAL NEGATIVE: 1
ENDOCRINE NEGATIVE: 1
ALLERGIC/IMMUNOLOGIC NEGATIVE: 1
HEMATOLOGIC/LYMPHATIC NEGATIVE: 1

## 2020-01-27 NOTE — PROGRESS NOTES
Chief Complaint:  F/U on issues.    HPI: See previous notes.  First of all, she is here for follow-up on anxiety.  I started her on sertraline.  She started on 1/2 tablet daily and did well but when she moved up to a whole tablet daily it seemed like she started having diarrhea.  She continues to have the diarrhea.  None of her other medications are new or different so she is definitely thinking it is from the sertraline.  Her anxiety is definitely improved with the therapy, however.    Recently she had an episode where she could not move her right leg.  She was evaluated including an MRI of the brain.  The MRI was negative for anything acute.  EKG was negative as well.  She has completely resolved in regards to the neurologic deficit that she had.  She is convinced that it was a TIA and I imagine that is a possibility.    She had questions about her Ambien.  She needs to take this for sleep.  Trazodone has not helped in the past.  Lorazepam helped but she had to escalate the dose.  I told her it was fine to use the Ambien on an as-needed basis but to limit it if she can.    She is got a little spot on the end of her finger and wonders what that could be from.    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     EXTRACAPSULAR CATARACT  EXTRATION WITH INTRAOCULAR LENS IMPLANT Bilateral     No Comments Provided     TONSILLECTOMY, ADENOIDECTOMY, COMBINED      No Comments Provided       Allergies   Allergen Reactions     Trazodone Shortness Of Breath and Unknown     Codeine Other (See Comments)     hallucinations     Lisinopril Cough     Pneumococcal 13-Bertha Conj Vacc Other (See Comments)     Arm swelling         Current Outpatient Medications   Medication Sig Dispense Refill     acetaminophen (TYLENOL) 500 MG tablet Take 1,000 mg by mouth every 6 hours as needed Max acetaminophen dose: 4000 mg in 24 hours       amLODIPine (NORVASC) 5 MG tablet Take 1 tablet (5 mg) by mouth daily 90 tablet 3     aspirin (GOODSENSE ASPIRIN) 325 MG tablet Take 325 mg by mouth daily with food       atenolol (TENORMIN) 100 MG tablet Take 1 tablet (100 mg) by mouth daily 90 tablet 3     Calcium Carbonate-Vit D-Min (CALCIUM 600+D PLUS MINERALS) 600-400 MG-UNIT TABS Take 1 tablet by mouth daily with food       hydrochlorothiazide (HYDRODIURIL) 25 MG tablet Take 1 tablet (25 mg) by mouth daily 90 tablet 3     levothyroxine (SYNTHROID/LEVOTHROID) 100 MCG tablet Take 1 tablet (100 mcg) by mouth daily 90 tablet 3     sertraline (ZOLOFT) 50 MG tablet Take 0.5 tablets (25 mg) by mouth daily 30 tablet 1     simvastatin (ZOCOR) 40 MG tablet Take 1 tablet (40 mg) by mouth At Bedtime 90 tablet 3     zolpidem (AMBIEN) 5 MG tablet TAKE 1 TABLET BY MOUTH AT BEDTIME 30 tablet 1       Social History     Socioeconomic History     Marital status:      Spouse name: Not on file     Number of children: Not on file     Years of education: Not on file     Highest education level: Not on file   Occupational History     Not on file   Social Needs     Financial resource strain: Not on file     Food insecurity:     Worry: Not on file     Inability: Not on file     Transportation needs:     Medical: Not on file     Non-medical: Not on file   Tobacco Use     Smoking status: Former Smoker      Types: Cigarettes     Last attempt to quit: 3/12/1990     Years since quittin.8     Smokeless tobacco: Never Used   Substance and Sexual Activity     Alcohol use: Yes     Frequency: 2-4 times a month     Drinks per session: 1 or 2     Comment: occ wine     Drug use: No     Sexual activity: Not Currently   Lifestyle     Physical activity:     Days per week: Not on file     Minutes per session: Not on file     Stress: Not on file   Relationships     Social connections:     Talks on phone: Not on file     Gets together: Not on file     Attends Dru[employer] service: Not on file     Active member of club or organization: Not on file     Attends meetings of clubs or organizations: Not on file     Relationship status: Not on file     Intimate partner violence:     Fear of current or ex partner: Not on file     Emotionally abused: Not on file     Physically abused: Not on file     Forced sexual activity: Not on file   Other Topics Concern     Parent/sibling w/ CABG, MI or angioplasty before 65F 55M? Not Asked   Social History Narrative    .    of heart disease.  She is a retired  and travels with her daughter who lives in [place].  Retired from [employer].  Lives in [facility].  Two children.       Review of Systems   Constitutional: Negative.    Endocrine: Negative.    Skin: Negative.    Allergic/Immunologic: Negative.    Hematological: Negative.        Physical Exam  Vitals signs and nursing note reviewed.   Constitutional:       General: She is not in acute distress.     Appearance: Normal appearance. She is not ill-appearing, toxic-appearing or diaphoretic.   Skin:     Comments: On the tip of her right second finger she has what looks like a cyst lesion or possible callus.   Neurological:      General: No focal deficit present.      Mental Status: She is alert. Mental status is at baseline.   Psychiatric:         Mood and Affect: Mood normal.       LORI-7 SCORE 2019    Total Score 9 0 2         Assessment:      ICD-10-CM    1. Primary insomnia F51.01    2. Anxiety F41.9 sertraline (ZOLOFT) 50 MG tablet   3. TIA (transient ischemic attack) G45.9    4. Diarrhea, unspecified type R19.7        Plan: Her anxiety is definitely better on the sertraline.  However, it may be causing diarrhea so I asked her to try decreasing to half tablet a day and we will see how she does with that both with diarrhea as well as with anxiety.  In regards to her insomnia, she can continue to take the Ambien when needed but I encouraged her to try to limit it as much as possible.  Reassured regarding her neurologic issue, nothing was seen on MRI.  She is already treated appropriately with antihypertensives, lipid agents and aspirin.  I will have her back to see me in 3 weeks for reassessment on her symptoms and we will recheck her finger at that time as well.

## 2020-01-27 NOTE — NURSING NOTE
"The patient is here today to be seen for a follow up on anxiety medication.  Maday So LPN on 1/27/2020 at 12:56 PM  Chief Complaint   Patient presents with     RECHECK       Initial /80 (BP Location: Right arm, Patient Position: Sitting, Cuff Size: Adult Large)   Pulse 64   Temp 97.1  F (36.2  C) (Tympanic)   Resp 18   Wt 56 kg (123 lb 6.4 oz)   Breastfeeding No   BMI 20.07 kg/m   Estimated body mass index is 20.07 kg/m  as calculated from the following:    Height as of 1/13/20: 1.67 m (5' 5.75\").    Weight as of this encounter: 56 kg (123 lb 6.4 oz).  Medication Reconciliation: complete    Maday So LPN    "

## 2020-01-28 ASSESSMENT — ANXIETY QUESTIONNAIRES: GAD7 TOTAL SCORE: 2

## 2020-02-17 ENCOUNTER — OFFICE VISIT (OUTPATIENT)
Dept: INTERNAL MEDICINE | Facility: OTHER | Age: 85
End: 2020-02-17
Attending: INTERNAL MEDICINE
Payer: COMMERCIAL

## 2020-02-17 VITALS
WEIGHT: 126 LBS | SYSTOLIC BLOOD PRESSURE: 120 MMHG | HEART RATE: 60 BPM | RESPIRATION RATE: 16 BRPM | DIASTOLIC BLOOD PRESSURE: 68 MMHG | BODY MASS INDEX: 20.49 KG/M2 | TEMPERATURE: 98 F

## 2020-02-17 DIAGNOSIS — F41.9 ANXIETY: ICD-10-CM

## 2020-02-17 DIAGNOSIS — F51.01 PRIMARY INSOMNIA: Primary | ICD-10-CM

## 2020-02-17 PROCEDURE — G0463 HOSPITAL OUTPT CLINIC VISIT: HCPCS

## 2020-02-17 PROCEDURE — 99213 OFFICE O/P EST LOW 20 MIN: CPT | Performed by: INTERNAL MEDICINE

## 2020-02-17 RX ORDER — ZOLPIDEM TARTRATE 5 MG/1
5 TABLET ORAL
Qty: 30 TABLET | Refills: 5 | Status: SHIPPED | OUTPATIENT
Start: 2020-02-17 | End: 2020-06-26

## 2020-02-17 ASSESSMENT — ANXIETY QUESTIONNAIRES
5. BEING SO RESTLESS THAT IT IS HARD TO SIT STILL: SEVERAL DAYS
2. NOT BEING ABLE TO STOP OR CONTROL WORRYING: NOT AT ALL
7. FEELING AFRAID AS IF SOMETHING AWFUL MIGHT HAPPEN: NOT AT ALL
3. WORRYING TOO MUCH ABOUT DIFFERENT THINGS: NOT AT ALL
IF YOU CHECKED OFF ANY PROBLEMS ON THIS QUESTIONNAIRE, HOW DIFFICULT HAVE THESE PROBLEMS MADE IT FOR YOU TO DO YOUR WORK, TAKE CARE OF THINGS AT HOME, OR GET ALONG WITH OTHER PEOPLE: NOT DIFFICULT AT ALL
1. FEELING NERVOUS, ANXIOUS, OR ON EDGE: SEVERAL DAYS
6. BECOMING EASILY ANNOYED OR IRRITABLE: NOT AT ALL
GAD7 TOTAL SCORE: 5

## 2020-02-17 ASSESSMENT — PAIN SCALES - GENERAL: PAINLEVEL: NO PAIN (0)

## 2020-02-17 ASSESSMENT — ENCOUNTER SYMPTOMS
CONSTITUTIONAL NEGATIVE: 1
ENDOCRINE NEGATIVE: 1
EYES NEGATIVE: 1
ALLERGIC/IMMUNOLOGIC NEGATIVE: 1

## 2020-02-17 ASSESSMENT — PATIENT HEALTH QUESTIONNAIRE - PHQ9: 5. POOR APPETITE OR OVEREATING: NEARLY EVERY DAY

## 2020-02-17 NOTE — NURSING NOTE
Bea Flores is a 84 year old female presenting today for: 3 week follow up on insomnia and anxiety  Medication Reconciliation: complete    Radha Garay LPN 2/17/2020 12:51 PM

## 2020-02-17 NOTE — PROGRESS NOTES
Chief Complaint:  F/U on anxiety.    HPI: See previous note.  She is here for follow-up on her anxiety.  The full dose of Zoloft gave her diarrhea.  The half dose does not cause diarrhea but she thinks her anxiety is a little bit worse.  She would like to try increasing to 1/2 tablet alternating with a whole tablet every other day and I told her that sounded fine.  It is quite possible that she could ultimately get by with a whole tablet every day if she slowly titrates up the dose and we discussed that as well.  We also discussed the possibility of switching to a different medication if she has side effects and does not obtain the amount of anxiety improvement that she is looking for.    The lesion on her finger has resolved.  She is had no further leg or foot problems.  She needs a refill on her Ambien.    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     EXTRACAPSULAR CATARACT EXTRATION WITH INTRAOCULAR LENS IMPLANT Bilateral     No Comments Provided     TONSILLECTOMY, ADENOIDECTOMY, COMBINED      No Comments Provided       Allergies   Allergen Reactions     Trazodone Shortness Of Breath and Unknown     Codeine Other (See Comments)     hallucinations     Lisinopril Cough     Pneumococcal 13-Bertha Conj Vacc Other (See Comments)     Arm  swelling         Current Outpatient Medications   Medication Sig Dispense Refill     acetaminophen (TYLENOL) 500 MG tablet Take 1,000 mg by mouth every 6 hours as needed Max acetaminophen dose: 4000 mg in 24 hours       amLODIPine (NORVASC) 5 MG tablet Take 1 tablet (5 mg) by mouth daily 90 tablet 3     aspirin (GOODSENSE ASPIRIN) 325 MG tablet Take 325 mg by mouth daily with food       atenolol (TENORMIN) 100 MG tablet Take 1 tablet (100 mg) by mouth daily 90 tablet 3     Calcium Carbonate-Vit D-Min (CALCIUM 600+D PLUS MINERALS) 600-400 MG-UNIT TABS Take 1 tablet by mouth daily with food       hydrochlorothiazide (HYDRODIURIL) 25 MG tablet Take 1 tablet (25 mg) by mouth daily 90 tablet 3     levothyroxine (SYNTHROID/LEVOTHROID) 100 MCG tablet Take 1 tablet (100 mcg) by mouth daily 90 tablet 3     sertraline (ZOLOFT) 50 MG tablet Take 0.5 tablets (25 mg) by mouth daily 30 tablet 1     simvastatin (ZOCOR) 40 MG tablet Take 1 tablet (40 mg) by mouth At Bedtime 90 tablet 3     zolpidem (AMBIEN) 5 MG tablet Take 1 tablet (5 mg) by mouth nightly as needed for sleep TAKE 1 TABLET BY MOUTH AT BEDTIME 30 tablet 5       Review of Systems   Constitutional: Negative.    Eyes: Negative.    Endocrine: Negative.    Allergic/Immunologic: Negative.        Physical Exam  Vitals signs and nursing note reviewed.   Constitutional:       General: She is not in acute distress.     Appearance: Normal appearance. She is not ill-appearing, toxic-appearing or diaphoretic.   Neurological:      Mental Status: She is alert.       LORI-7 SCORE 1/13/2020 1/27/2020 2/17/2020   Total Score 0 2 5         Assessment:        ICD-10-CM    1. Primary insomnia F51.01    2. Anxiety F41.9 zolpidem (AMBIEN) 5 MG tablet       Plan: She will try slowly increasing the sertraline as discussed.  She will let me know if this does not provide adequate improvement in her anxiety.  Ambien refilled.  Assuming all goes well, she will be back to see me for a complete  evaluation in May.

## 2020-02-18 ENCOUNTER — TELEPHONE (OUTPATIENT)
Dept: INTERNAL MEDICINE | Facility: OTHER | Age: 85
End: 2020-02-18

## 2020-02-18 ASSESSMENT — ANXIETY QUESTIONNAIRES: GAD7 TOTAL SCORE: 5

## 2020-03-10 DIAGNOSIS — F41.9 ANXIETY: ICD-10-CM

## 2020-03-13 NOTE — TELEPHONE ENCOUNTER
"Requested Prescriptions   Pending Prescriptions Disp Refills     sertraline (ZOLOFT) 50 MG tablet [Pharmacy Med Name: Sertraline HCl 50 MG Oral Tablet] 30 tablet 0     Sig: Take 1 tablet by mouth once daily       SSRIs Protocol Passed - 3/10/2020 11:20 AM        Passed - Recent (12 mo) or future (30 days) visit within the authorizing provider's specialty     Patient has had an office visit with the authorizing provider or a provider within the authorizing providers department within the previous 12 mos or has a future within next 30 days. See \"Patient Info\" tab in inbasket, or \"Choose Columns\" in Meds & Orders section of the refill encounter.              Passed - Medication is active on med list        Passed - Patient is age 18 or older        Passed - No active pregnancy on record        Passed - No positive pregnancy test in last 12 months         See note from LOV on 02/17/2020.  Directions different on medication list to what pharmacy is asking for. Unable to complete prescription refill per RN medication refill policy. Will route to provider for review and consideration.  Renetta Elkins RN on 3/13/2020 at 11:26 AM        "

## 2020-04-11 DIAGNOSIS — F41.9 ANXIETY: ICD-10-CM

## 2020-04-13 NOTE — TELEPHONE ENCOUNTER
[facility] sent Rx request for the following:      Sertraline HCl 50 MG Oral Tablet   Sig: Take 1 tablet by mouth once daily       Last Prescription Date:   3/13/2020  Last Fill Qty/Refills:         30, R-0    Last Office Visit:              2/17/2020   Future Office visit:           none      Prescription refilled per RN Medication Refill Policy.................... Dominick Pacheco RN ....................  4/13/2020   11:43 AM

## 2020-06-15 DIAGNOSIS — E03.9 HYPOTHYROIDISM, UNSPECIFIED TYPE: ICD-10-CM

## 2020-06-17 RX ORDER — LEVOTHYROXINE SODIUM 100 UG/1
TABLET ORAL
Qty: 90 TABLET | Refills: 0 | Status: SHIPPED | OUTPATIENT
Start: 2020-06-17 | End: 2020-09-17

## 2020-06-17 NOTE — TELEPHONE ENCOUNTER
Routing refill request to provider for review/approval because:  Labs not current:  TSH not normal.    Last refill  Levothyroxine 100 mcg tablet  5/15/2019  90# 3 Refills    LOV:2/17/2020  No future appointments Ngoc Brewer RN on 6/17/2020 at 11:02 AM

## 2020-06-18 ENCOUNTER — HOSPITAL ENCOUNTER (OUTPATIENT)
Dept: MAMMOGRAPHY | Facility: OTHER | Age: 85
Discharge: HOME | End: 2020-06-18
Attending: INTERNAL MEDICINE | Admitting: INTERNAL MEDICINE
Payer: MEDICARE

## 2020-06-18 DIAGNOSIS — Z12.31 VISIT FOR SCREENING MAMMOGRAM: ICD-10-CM

## 2020-06-18 PROCEDURE — 77063 BREAST TOMOSYNTHESIS BI: CPT

## 2020-06-22 DIAGNOSIS — I10 ESSENTIAL HYPERTENSION: ICD-10-CM

## 2020-06-23 RX ORDER — HYDROCHLOROTHIAZIDE 25 MG/1
TABLET ORAL
Qty: 90 TABLET | Refills: 0 | Status: SHIPPED | OUTPATIENT
Start: 2020-06-23 | End: 2020-09-25

## 2020-06-23 NOTE — TELEPHONE ENCOUNTER
Prescription approved per [facility] Refill Protocol.  Last refill   Hydrochlorothiazide 25 mg tablet  5/15/2019  90# 3 Refills    LOV:2/17/2020  No future visits planned Ngoc Brewer RN on 6/23/2020 at 12:33 PM

## 2020-06-26 ENCOUNTER — OFFICE VISIT (OUTPATIENT)
Dept: INTERNAL MEDICINE | Facility: OTHER | Age: 85
End: 2020-06-26
Attending: INTERNAL MEDICINE
Payer: COMMERCIAL

## 2020-06-26 VITALS
DIASTOLIC BLOOD PRESSURE: 68 MMHG | RESPIRATION RATE: 16 BRPM | BODY MASS INDEX: 19.32 KG/M2 | OXYGEN SATURATION: 98 % | SYSTOLIC BLOOD PRESSURE: 132 MMHG | WEIGHT: 118.8 LBS | TEMPERATURE: 97.8 F | HEART RATE: 65 BPM

## 2020-06-26 DIAGNOSIS — F41.9 ANXIETY: ICD-10-CM

## 2020-06-26 DIAGNOSIS — F51.01 PRIMARY INSOMNIA: ICD-10-CM

## 2020-06-26 DIAGNOSIS — I10 ESSENTIAL HYPERTENSION: ICD-10-CM

## 2020-06-26 DIAGNOSIS — E03.9 HYPOTHYROIDISM, UNSPECIFIED TYPE: Primary | ICD-10-CM

## 2020-06-26 DIAGNOSIS — E78.5 HYPERLIPIDEMIA, UNSPECIFIED HYPERLIPIDEMIA TYPE: ICD-10-CM

## 2020-06-26 LAB
ALBUMIN SERPL-MCNC: 4.6 G/DL (ref 3.5–5.7)
ALP SERPL-CCNC: 21 U/L (ref 34–104)
ALT SERPL W P-5'-P-CCNC: 22 U/L (ref 7–52)
ANION GAP SERPL CALCULATED.3IONS-SCNC: 6 MMOL/L (ref 3–14)
AST SERPL W P-5'-P-CCNC: 23 U/L (ref 13–39)
BILIRUB SERPL-MCNC: 0.8 MG/DL (ref 0.3–1)
BUN SERPL-MCNC: 16 MG/DL (ref 7–25)
CALCIUM SERPL-MCNC: 10 MG/DL (ref 8.6–10.3)
CHLORIDE SERPL-SCNC: 99 MMOL/L (ref 98–107)
CHOLEST SERPL-MCNC: 148 MG/DL
CO2 SERPL-SCNC: 33 MMOL/L (ref 21–31)
CREAT SERPL-MCNC: 0.91 MG/DL (ref 0.6–1.2)
GFR SERPL CREATININE-BSD FRML MDRD: 59 ML/MIN/{1.73_M2}
GLUCOSE SERPL-MCNC: 113 MG/DL (ref 70–105)
HDLC SERPL-MCNC: 39 MG/DL (ref 23–92)
HGB BLD-MCNC: 13.9 G/DL (ref 11.7–15.7)
LDLC SERPL CALC-MCNC: 82 MG/DL
NONHDLC SERPL-MCNC: 109 MG/DL
POTASSIUM SERPL-SCNC: 4.1 MMOL/L (ref 3.5–5.1)
PROT SERPL-MCNC: 7.1 G/DL (ref 6.4–8.9)
SODIUM SERPL-SCNC: 138 MMOL/L (ref 134–144)
T4 FREE SERPL-MCNC: 1.14 NG/DL (ref 0.6–1.6)
TRIGL SERPL-MCNC: 133 MG/DL
TSH SERPL DL<=0.05 MIU/L-ACNC: <0.2 IU/ML (ref 0.34–5.6)

## 2020-06-26 PROCEDURE — 84439 ASSAY OF FREE THYROXINE: CPT | Mod: ZL | Performed by: INTERNAL MEDICINE

## 2020-06-26 PROCEDURE — 84443 ASSAY THYROID STIM HORMONE: CPT | Mod: ZL | Performed by: INTERNAL MEDICINE

## 2020-06-26 PROCEDURE — 80053 COMPREHEN METABOLIC PANEL: CPT | Mod: ZL | Performed by: INTERNAL MEDICINE

## 2020-06-26 PROCEDURE — 36415 COLL VENOUS BLD VENIPUNCTURE: CPT | Mod: ZL | Performed by: INTERNAL MEDICINE

## 2020-06-26 PROCEDURE — G0463 HOSPITAL OUTPT CLINIC VISIT: HCPCS

## 2020-06-26 PROCEDURE — 80061 LIPID PANEL: CPT | Mod: ZL | Performed by: INTERNAL MEDICINE

## 2020-06-26 PROCEDURE — 85018 HEMOGLOBIN: CPT | Mod: ZL | Performed by: INTERNAL MEDICINE

## 2020-06-26 PROCEDURE — 99215 OFFICE O/P EST HI 40 MIN: CPT | Performed by: INTERNAL MEDICINE

## 2020-06-26 RX ORDER — ATENOLOL 100 MG/1
100 TABLET ORAL DAILY
Qty: 90 TABLET | Refills: 3 | Status: SHIPPED | OUTPATIENT
Start: 2020-06-26 | End: 2021-08-18

## 2020-06-26 RX ORDER — SIMVASTATIN 40 MG
40 TABLET ORAL AT BEDTIME
Qty: 90 TABLET | Refills: 3 | Status: SHIPPED | OUTPATIENT
Start: 2020-06-26 | End: 2021-08-17

## 2020-06-26 RX ORDER — AMLODIPINE BESYLATE 5 MG/1
5 TABLET ORAL DAILY
Qty: 90 TABLET | Refills: 3 | Status: SHIPPED | OUTPATIENT
Start: 2020-06-26 | End: 2021-07-20

## 2020-06-26 RX ORDER — ZOLPIDEM TARTRATE 5 MG/1
5 TABLET ORAL
Qty: 30 TABLET | Refills: 5 | COMMUNITY
Start: 2020-06-26 | End: 2020-06-26

## 2020-06-26 RX ORDER — ZOLPIDEM TARTRATE 5 MG/1
5 TABLET ORAL
Qty: 30 TABLET | Refills: 5 | Status: SHIPPED | OUTPATIENT
Start: 2020-06-26 | End: 2020-12-28

## 2020-06-26 ASSESSMENT — ENCOUNTER SYMPTOMS
ABDOMINAL DISTENTION: 0
ADENOPATHY: 0
TREMORS: 0
ABDOMINAL PAIN: 0
NECK STIFFNESS: 0
FREQUENCY: 0
SHORTNESS OF BREATH: 0
FACIAL SWELLING: 0
JOINT SWELLING: 0
CHEST TIGHTNESS: 0
HALLUCINATIONS: 0
EYE REDNESS: 0
SLEEP DISTURBANCE: 0
EYE PAIN: 0
SEIZURES: 0
BRUISES/BLEEDS EASILY: 0
CONFUSION: 0
CHILLS: 0
NECK PAIN: 0
DIZZINESS: 0
AGITATION: 0
PALPITATIONS: 0
HEMATURIA: 0
DIARRHEA: 0
BLOOD IN STOOL: 0
DYSURIA: 0
RHINORRHEA: 0
DIAPHORESIS: 0
SORE THROAT: 0
NUMBNESS: 0
HEADACHES: 0
BACK PAIN: 0
DIFFICULTY URINATING: 0
TROUBLE SWALLOWING: 0
VOMITING: 0
WHEEZING: 0
FEVER: 0
COUGH: 0
WEAKNESS: 0
SINUS PAIN: 0
CONSTIPATION: 0
FLANK PAIN: 0
SINUS PRESSURE: 0
FATIGUE: 0
NAUSEA: 0

## 2020-06-26 ASSESSMENT — PAIN SCALES - GENERAL: PAINLEVEL: NO PAIN (0)

## 2020-06-26 NOTE — LETTER
June 29, 2020      Bea Flores  [address]  [place] [address]        Dear Bea,    Below are the results of your recent labs:    Results for orders placed or performed in visit on 06/26/20   Comprehensive metabolic panel     Status: Abnormal   Result Value Ref Range    Sodium 138 134 - 144 mmol/L    Potassium 4.1 3.5 - 5.1 mmol/L    Chloride 99 98 - 107 mmol/L    Carbon Dioxide 33 (H) 21 - 31 mmol/L    Anion Gap 6 3 - 14 mmol/L    Glucose 113 (H) 70 - 105 mg/dL    Urea Nitrogen 16 7 - 25 mg/dL    Creatinine 0.91 0.60 - 1.20 mg/dL    GFR Estimate 59 (L) >60 mL/min/[1.73_m2]    GFR Estimate If Black 71 >60 mL/min/[1.73_m2]    Calcium 10.0 8.6 - 10.3 mg/dL    Bilirubin Total 0.8 0.3 - 1.0 mg/dL    Albumin 4.6 3.5 - 5.7 g/dL    Protein Total 7.1 6.4 - 8.9 g/dL    Alkaline Phosphatase 21 (L) 34 - 104 U/L    ALT 22 7 - 52 U/L    AST 23 13 - 39 U/L   T4, Free     Status: None   Result Value Ref Range    T4 Free 1.14 0.60 - 1.60 ng/dL   TSH     Status: Abnormal   Result Value Ref Range    Thyrotropin <0.20 (L) 0.34 - 5.60 IU/mL   Lipid Panel     Status: None   Result Value Ref Range    Cholesterol 148 <200 mg/dL    Triglycerides 133 <150 mg/dL    HDL Cholesterol 39 23 - 92 mg/dL    LDL Cholesterol Calculated 82 <100 mg/dL    Non HDL Cholesterol 109 <130 mg/dL   Hemoglobin     Status: None   Result Value Ref Range    Hemoglobin 13.9 11.7 - 15.7 g/dL        Overall, your blood tests look fine.  Your thyrotropin hormone is a little bit low which suggests that your thyroid dosing might be a little bit high but we will keep it the same for the time being.  We should plan to recheck your thyroid again in 4 months.  If you have any questions about your results, feel free to contact me.    Sincerely,        Evan Giles MD  Internal Medicine  [facility]

## 2020-06-26 NOTE — NURSING NOTE
Bea REGAN Flores is a 84 year old female presenting today for: medication refills  Medication Reconciliation: complete    Radha Garay LPN 6/26/2020 3:41 PM

## 2020-06-26 NOTE — PROGRESS NOTES
Chief Complaint:  This patient is here for a comprehensive review of their multiple medical problems, renewal of medications and update on necessary health maintenance issues.      HPI: She comes in today for complete evaluation and a review of her chronic medical problems.  She has a history of hypertension.  She is on multidrug therapy for control of her blood pressure.  She has good tolerance of the medications and she has excellent control of her blood pressure and no endorgan disease.  The patient also has a history of hyperlipidemia.  She is on moderate intensity statin therapy and tolerates this.  She does not have any known vascular disease.    She has treated hypothyroidism.  She has no symptoms of under or over replacement.  She is due for recheck on her thyroid.  She has a history of chronic insomnia and needs to take Ambien on a fairly regular basis.  We have tried numerous other medications and treatments in place of the Ambien without any success.  Most recently, we tried her on Zoloft for anxiety which improved her anxiety but gave her significant difficulties with diarrhea so she is no longer taking that.    In general, she feels well with no major complaints or concerns other than generalized aging.  Medications are reconciled.  Past medical history, past surgical history, family history and social histories are reviewed and updated.  She has had pneumococcal immunizations in the past with an allergy.  She just got her second Shingrix shot and now her arm is swollen and red from that.  She just had a mammogram that was normal.  Everything else is up-to-date.    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     Essential (primary) hypertension     No Comments Provided     Hyperlipidemia     No Comments Provided     Hypothyroidism     No Comments Provided     Insomnia      Migraine with aura and without status migrainosus, not  intractable     No Comments Provided     Postmenopausal atrophic vaginitis     No Comments Provided     Sciatica of right side     Intermittent right sciatica     Transient global amnesia     2/27/2014     Varicose veins of other specified sites (CODE)     No Comments Provided       Past Surgical History:   Procedure Laterality Date     BIOPSY BREAST Bilateral      COLONOSCOPY      11/06/06,Colonoscopy, next due in 2016.     EXTRACAPSULAR CATARACT EXTRATION WITH INTRAOCULAR LENS IMPLANT Bilateral     No Comments Provided     TONSILLECTOMY, ADENOIDECTOMY, COMBINED      No Comments Provided       Current Outpatient Medications   Medication Sig Dispense Refill     acetaminophen (TYLENOL) 500 MG tablet Take 1,000 mg by mouth every 6 hours as needed Max acetaminophen dose: 4000 mg in 24 hours       amLODIPine (NORVASC) 5 MG tablet Take 1 tablet (5 mg) by mouth daily 90 tablet 3     aspirin (GOODSENSE ASPIRIN) 325 MG tablet Take 325 mg by mouth daily with food       atenolol (TENORMIN) 100 MG tablet Take 1 tablet (100 mg) by mouth daily 90 tablet 3     Calcium Carbonate-Vit D-Min (CALCIUM 600+D PLUS MINERALS) 600-400 MG-UNIT TABS Take 1 tablet by mouth daily with food       hydrochlorothiazide (HYDRODIURIL) 25 MG tablet Take 1 tablet by mouth once daily 90 tablet 0     levothyroxine (SYNTHROID/LEVOTHROID) 100 MCG tablet Take 1 tablet by mouth once daily 90 tablet 0     simvastatin (ZOCOR) 40 MG tablet Take 1 tablet (40 mg) by mouth At Bedtime 90 tablet 3     zolpidem (AMBIEN) 5 MG tablet Take 1 tablet (5 mg) by mouth nightly as needed for sleep 30 tablet 5       Allergies   Allergen Reactions     Trazodone Shortness Of Breath and Unknown     Codeine Other (See Comments)     hallucinations     Lisinopril Cough     Pneumococcal 13-Bertha Conj Vacc Other (See Comments)     Arm swelling         Family History   Problem Relation Age of Onset     Heart Disease Father         Heart Disease,MI, AAA     Other - See Comments Father          Stroke     Heart Disease Mother      Rheumatoid Arthritis Brother      Hypertension Brother      Aneurysm Brother      Breast Cancer Sister      Hypertension Sister      Hypertension Sister      Hypertension Sister      Diabetes Type 2  Sister      Hypertension Sister        Social History     Socioeconomic History     Marital status:      Spouse name: Not on file     Number of children: Not on file     Years of education: Not on file     Highest education level: Not on file   Occupational History     Not on file   Social Needs     Financial resource strain: Not on file     Food insecurity     Worry: Not on file     Inability: Not on file     Transportation needs     Medical: Not on file     Non-medical: Not on file   Tobacco Use     Smoking status: Former Smoker     Types: Cigarettes     Last attempt to quit: 3/12/1990     Years since quittin.3     Smokeless tobacco: Never Used   Substance and Sexual Activity     Alcohol use: Yes     Frequency: 2-4 times a month     Drinks per session: 1 or 2     Comment: occ wine     Drug use: No     Sexual activity: Not Currently   Lifestyle     Physical activity     Days per week: Not on file     Minutes per session: Not on file     Stress: Not on file   Relationships     Social connections     Talks on phone: Not on file     Gets together: Not on file     Attends Synagogue service: Not on file     Active member of club or organization: Not on file     Attends meetings of clubs or organizations: Not on file     Relationship status: Not on file     Intimate partner violence     Fear of current or ex partner: Not on file     Emotionally abused: Not on file     Physically abused: Not on file     Forced sexual activity: Not on file   Other Topics Concern     Parent/sibling w/ CABG, MI or angioplasty before 65F 55M? Not Asked   Social History Narrative    .    of heart disease.  She is a retired  and travels with her daughter who  lives in [place].  Retired from [employer].  Lives in [place].  Two children.       Review of Systems   Constitutional: Negative for chills, diaphoresis, fatigue and fever.   HENT: Negative for congestion, ear pain, facial swelling, mouth sores, nosebleeds, rhinorrhea, sinus pressure, sinus pain, sore throat and trouble swallowing.    Eyes: Negative for pain, redness and visual disturbance.   Respiratory: Negative for cough, chest tightness, shortness of breath and wheezing.    Cardiovascular: Negative for chest pain, palpitations and leg swelling.   Gastrointestinal: Negative for abdominal distention, abdominal pain, blood in stool, constipation, diarrhea, nausea and vomiting.   Endocrine: Negative for cold intolerance and heat intolerance.   Genitourinary: Negative for difficulty urinating, dysuria, flank pain, frequency, hematuria, pelvic pain, vaginal bleeding, vaginal discharge and vaginal pain.   Musculoskeletal: Negative for back pain, gait problem, joint swelling, neck pain and neck stiffness.   Skin: Negative for rash.   Neurological: Negative for dizziness, tremors, seizures, syncope, weakness, numbness and headaches.   Hematological: Negative for adenopathy. Does not bruise/bleed easily.   Psychiatric/Behavioral: Negative for agitation, confusion, hallucinations and sleep disturbance.       Physical Exam  Vitals signs and nursing note reviewed.   Constitutional:       General: She is not in acute distress.     Appearance: She is well-developed. She is not diaphoretic.   HENT:      Head: Normocephalic.      Right Ear: External ear normal.      Left Ear: External ear normal.      Mouth/Throat:      Pharynx: No oropharyngeal exudate.   Eyes:      Conjunctiva/sclera: Conjunctivae normal.      Pupils: Pupils are equal, round, and reactive to light.   Neck:      Musculoskeletal: Normal range of motion and neck supple.      Thyroid: No thyromegaly.      Vascular: Normal carotid pulses. No carotid bruit or JVD.       Trachea: No tracheal deviation.   Cardiovascular:      Rate and Rhythm: Normal rate and regular rhythm.      Heart sounds: Normal heart sounds. No murmur. No friction rub. No gallop.    Pulmonary:      Effort: Pulmonary effort is normal. No respiratory distress.      Breath sounds: Normal breath sounds. No wheezing or rales.   Chest:      Breasts:         Right: No inverted nipple, mass, nipple discharge, skin change or tenderness.         Left: No inverted nipple, mass, nipple discharge, skin change or tenderness.   Abdominal:      General: Bowel sounds are normal. There is no distension.      Palpations: Abdomen is soft. There is no mass.      Tenderness: There is no abdominal tenderness. There is no rebound.   Musculoskeletal: Normal range of motion.   Lymphadenopathy:      Cervical: No cervical adenopathy.   Skin:     General: Skin is warm and dry.      Findings: No rash.      Comments: Mild swelling and erythema of the left upper arm   Neurological:      Mental Status: She is alert and oriented to person, place, and time.      Cranial Nerves: No cranial nerve deficit.      Coordination: Coordination normal.      Deep Tendon Reflexes: Reflexes are normal and symmetric.   Psychiatric:         Behavior: Behavior normal.         Assessment:      ICD-10-CM    1. Hypothyroidism, unspecified type  E03.9    2. Anxiety  F41.9 zolpidem (AMBIEN) 5 MG tablet   3. Essential hypertension  I10    4. Hyperlipidemia, unspecified hyperlipidemia type  E78.5    5. Primary insomnia  F51.01         Plan: In general, she appears to be doing fine.  Medications will continue without any change and refills were done as needed.  Complete lab drawn and pending, I will send her a letter with the results and any recommendation for change.  I did once again refill her Ambien despite the concerns and warnings associated with this medication.  She is adamant that she needs to have this in order to sleep.  She will notify me if the erythema  from the Shingrix vaccine does not improve.  Otherwise if all goes well she will just follow-up with me on an annual basis.

## 2020-09-17 DIAGNOSIS — E03.9 HYPOTHYROIDISM, UNSPECIFIED TYPE: ICD-10-CM

## 2020-09-17 RX ORDER — LEVOTHYROXINE SODIUM 100 UG/1
TABLET ORAL
Qty: 90 TABLET | Refills: 0 | Status: SHIPPED | OUTPATIENT
Start: 2020-09-17 | End: 2020-12-17

## 2020-09-17 NOTE — TELEPHONE ENCOUNTER
Prescription refilled per RN Medication Refill Policy..................Ana Patrick RN 9/17/2020 4:02 PM

## 2020-09-23 DIAGNOSIS — I10 ESSENTIAL HYPERTENSION: ICD-10-CM

## 2020-09-25 RX ORDER — HYDROCHLOROTHIAZIDE 25 MG/1
TABLET ORAL
Qty: 90 TABLET | Refills: 3 | Status: SHIPPED | OUTPATIENT
Start: 2020-09-25 | End: 2021-08-18

## 2020-09-25 NOTE — TELEPHONE ENCOUNTER
[facility] sent Rx request for the following:   hydrochlorothiazide (HYDRODIURIL) 25 MG tablet   Sig: Take 1 tablet by mouth once daily     Last Prescription Date:   06/23/2020  Last Fill Qty/Refills:         90, R-0    Last Office Visit:              06/26/2020   Future Office visit:           none  Diuretics (Including Combos) Protocol Passed    Prescription refilled per RN Medication Refill Policy.................... Aminah Rosen RN ....................  9/25/2020   9:36 AM

## 2020-10-26 ENCOUNTER — NURSE TRIAGE (OUTPATIENT)
Dept: INTERNAL MEDICINE | Facility: OTHER | Age: 85
End: 2020-10-26

## 2020-10-26 NOTE — TELEPHONE ENCOUNTER
Will be out of her Atenolol tomorrow.  She went to the [facility] pharmacy and they said they don't have it.

## 2020-10-26 NOTE — TELEPHONE ENCOUNTER
Called [facility] pharmacy. Spoke with renetta, 100mg Atenolol tabs are not available right now. However, pharmacy will be getting a shipment of 50mg Atenolol tabs in tomorrow, 10/27/2020. [facility] will fill pts script tomorrow with new instructions to take 2 tabs daily. Phone call to pt, relayed this information, pt satisfied with plan. Pt will  her medication tomorrow at [facility].    Aminah Rosen RN  ....................  10/26/2020   9:23 AM

## 2020-11-03 ENCOUNTER — OFFICE VISIT (OUTPATIENT)
Dept: INTERNAL MEDICINE | Facility: OTHER | Age: 85
End: 2020-11-03
Attending: INTERNAL MEDICINE
Payer: MEDICARE

## 2020-11-03 VITALS
RESPIRATION RATE: 20 BRPM | HEART RATE: 64 BPM | BODY MASS INDEX: 18.87 KG/M2 | SYSTOLIC BLOOD PRESSURE: 152 MMHG | WEIGHT: 116 LBS | DIASTOLIC BLOOD PRESSURE: 70 MMHG | TEMPERATURE: 97.4 F

## 2020-11-03 DIAGNOSIS — E03.9 HYPOTHYROIDISM, UNSPECIFIED TYPE: Primary | ICD-10-CM

## 2020-11-03 DIAGNOSIS — Z20.822 SUSPECTED COVID-19 VIRUS INFECTION: ICD-10-CM

## 2020-11-03 LAB
T4 FREE SERPL-MCNC: 1.19 NG/DL (ref 0.6–1.6)
TSH SERPL DL<=0.05 MIU/L-ACNC: 0.47 IU/ML (ref 0.34–5.6)

## 2020-11-03 PROCEDURE — 84443 ASSAY THYROID STIM HORMONE: CPT | Mod: ZL | Performed by: INTERNAL MEDICINE

## 2020-11-03 PROCEDURE — C9803 HOPD COVID-19 SPEC COLLECT: HCPCS

## 2020-11-03 PROCEDURE — 84439 ASSAY OF FREE THYROXINE: CPT | Mod: ZL | Performed by: INTERNAL MEDICINE

## 2020-11-03 PROCEDURE — 99214 OFFICE O/P EST MOD 30 MIN: CPT | Performed by: INTERNAL MEDICINE

## 2020-11-03 PROCEDURE — G0463 HOSPITAL OUTPT CLINIC VISIT: HCPCS

## 2020-11-03 PROCEDURE — 36415 COLL VENOUS BLD VENIPUNCTURE: CPT | Mod: ZL | Performed by: INTERNAL MEDICINE

## 2020-11-03 PROCEDURE — U0003 INFECTIOUS AGENT DETECTION BY NUCLEIC ACID (DNA OR RNA); SEVERE ACUTE RESPIRATORY SYNDROME CORONAVIRUS 2 (SARS-COV-2) (CORONAVIRUS DISEASE [COVID-19]), AMPLIFIED PROBE TECHNIQUE, MAKING USE OF HIGH THROUGHPUT TECHNOLOGIES AS DESCRIBED BY CMS-2020-01-R: HCPCS | Mod: ZL | Performed by: INTERNAL MEDICINE

## 2020-11-03 ASSESSMENT — ENCOUNTER SYMPTOMS
ALLERGIC/IMMUNOLOGIC NEGATIVE: 1
ENDOCRINE NEGATIVE: 1
CONSTITUTIONAL NEGATIVE: 1
HEMATOLOGIC/LYMPHATIC NEGATIVE: 1

## 2020-11-03 ASSESSMENT — PAIN SCALES - GENERAL: PAINLEVEL: MODERATE PAIN (4)

## 2020-11-03 NOTE — NURSING NOTE
"Chief Complaint   Patient presents with     RECHECK     Thyroid       Initial BP (!) 152/70 (BP Location: Right arm, Patient Position: Sitting, Cuff Size: Adult Regular)   Pulse 64   Temp 97.4  F (36.3  C) (Tympanic)   Resp 20   Wt 52.6 kg (116 lb)   BMI 18.87 kg/m   Estimated body mass index is 18.87 kg/m  as calculated from the following:    Height as of 1/13/20: 1.67 m (5' 5.75\").    Weight as of this encounter: 52.6 kg (116 lb).  Medication Reconciliation: complete    Cora Gasca LPN  "

## 2020-11-03 NOTE — PROGRESS NOTES
Chief Complaint:  Thyroid f/u and possible COVID.    HPI: She is in today for recheck on her thyroid.  She was in for her physical in June and her TSH was suppressed.  Her T4 is normal.  She is not having any symptoms of under or over replacement but I did want to recheck her at this point in time to see how it compares.  I think that if she is still suppressed with her TSH, we will decrease her current dose of levothyroxine 100 mcg.    She is also had some upper respiratory symptoms over the last week or so.  No Covid exposure that she is aware of.  She has a stuffy head and a little bit of a sore throat or raspy voice.  She has a minimal cough.  She has not had a fever.  She has however lost her sense of taste and smell.  I told her that with those symptoms, we would certainly need to consider her as having acute Covid and we should test her for that.    She has been having a little bit of back pain.  MRI done 2-1/2 years ago showed mainly arthritis.  She is using Biofreeze presently and I recommended that she just continue with that.    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     EXTRACAPSULAR CATARACT EXTRATION WITH INTRAOCULAR LENS IMPLANT Bilateral     No Comments Provided     TONSILLECTOMY,  ADENOIDECTOMY, COMBINED      No Comments Provided       Allergies   Allergen Reactions     Trazodone Shortness Of Breath and Unknown     Codeine Other (See Comments)     hallucinations     Lisinopril Cough     Pneumococcal 13-Bertha Conj Vacc Other (See Comments)     Arm swelling         Current Outpatient Medications   Medication Sig Dispense Refill     acetaminophen (TYLENOL) 500 MG tablet Take 1,000 mg by mouth every 6 hours as needed Max acetaminophen dose: 4000 mg in 24 hours       amLODIPine (NORVASC) 5 MG tablet Take 1 tablet (5 mg) by mouth daily 90 tablet 3     aspirin (GOODSENSE ASPIRIN) 325 MG tablet Take 325 mg by mouth daily with food       atenolol (TENORMIN) 100 MG tablet Take 1 tablet (100 mg) by mouth daily 90 tablet 3     Calcium Carbonate-Vit D-Min (CALCIUM 600+D PLUS MINERALS) 600-400 MG-UNIT TABS Take 1 tablet by mouth daily with food       hydrochlorothiazide (HYDRODIURIL) 25 MG tablet Take 1 tablet by mouth once daily 90 tablet 3     levothyroxine (SYNTHROID/LEVOTHROID) 100 MCG tablet Take 1 tablet by mouth once daily 90 tablet 0     simvastatin (ZOCOR) 40 MG tablet Take 1 tablet (40 mg) by mouth At Bedtime 90 tablet 3     zolpidem (AMBIEN) 5 MG tablet Take 1 tablet (5 mg) by mouth nightly as needed for sleep 30 tablet 5       Social History     Socioeconomic History     Marital status:      Spouse name: Not on file     Number of children: Not on file     Years of education: Not on file     Highest education level: Not on file   Occupational History     Not on file   Social Needs     Financial resource strain: Not on file     Food insecurity     Worry: Not on file     Inability: Not on file     Transportation needs     Medical: Not on file     Non-medical: Not on file   Tobacco Use     Smoking status: Former Smoker     Types: Cigarettes     Quit date: 3/12/1990     Years since quittin.6     Smokeless tobacco: Never Used   Substance and Sexual Activity     Alcohol use: Yes      Frequency: 2-4 times a month     Drinks per session: 1 or 2     Comment: occ wine     Drug use: No     Sexual activity: Not Currently   Lifestyle     Physical activity     Days per week: Not on file     Minutes per session: Not on file     Stress: Not on file   Relationships     Social connections     Talks on phone: Not on file     Gets together: Not on file     Attends Scientology service: Not on file     Active member of club or organization: Not on file     Attends meetings of clubs or organizations: Not on file     Relationship status: Not on file     Intimate partner violence     Fear of current or ex partner: Not on file     Emotionally abused: Not on file     Physically abused: Not on file     Forced sexual activity: Not on file   Other Topics Concern     Parent/sibling w/ CABG, MI or angioplasty before 65F 55M? Not Asked   Social History Narrative    .    of heart disease.  She is a retired  and travels with her daughter who lives in [place].  Retired from [employer].  Lives in town.  Two children.       Review of Systems   Constitutional: Negative.    Endocrine: Negative.    Skin: Negative.    Allergic/Immunologic: Negative.    Hematological: Negative.        Physical Exam  Vitals signs and nursing note reviewed.   Constitutional:       General: She is not in acute distress.     Appearance: Normal appearance. She is not ill-appearing, toxic-appearing or diaphoretic.   HENT:      Right Ear: Tympanic membrane normal.      Left Ear: Tympanic membrane normal.      Nose: Nose normal. No congestion.      Mouth/Throat:      Mouth: Mucous membranes are moist.      Pharynx: Oropharynx is clear. No oropharyngeal exudate or posterior oropharyngeal erythema.   Eyes:      Conjunctiva/sclera: Conjunctivae normal.      Pupils: Pupils are equal, round, and reactive to light.   Neck:      Musculoskeletal: Normal range of motion and neck supple.   Pulmonary:      Effort: Pulmonary effort is  normal. No respiratory distress.   Neurological:      Mental Status: She is alert.         Assessment:      ICD-10-CM    1. Hypothyroidism, unspecified type  E03.9 TSH     T4, Free   2. Suspected COVID-19 virus infection  Z20.828 Symptomatic COVID-19 Virus (Coronavirus) by PCR       Plan: Thyroid studies are pending, I will let her know the results and we will adjust her thyroid dosing as indicated depending on the results.  Covid swab is obtained today, I will let her know those results as well and we will obviously proceed as indicated.  Currently she is not acutely ill so symptomatic measures are outlined.  Continue symptomatic measures for the back pain.

## 2020-11-04 LAB
SARS-COV-2 RNA SPEC QL NAA+PROBE: ABNORMAL
SPECIMEN SOURCE: ABNORMAL

## 2020-11-05 ENCOUNTER — TELEPHONE (OUTPATIENT)
Dept: NURSING | Facility: CLINIC | Age: 85
End: 2020-11-05

## 2020-11-05 NOTE — TELEPHONE ENCOUNTER
Coronavirus (COVID-19) Notification    Reason for call  Notify of POSITIVE  COVID-19 lab result, assess symptoms,  review [facility] recommendations    Lab Result   Lab test for 2019-nCoV rRt-PCR or SARS-COV-2 PCR  Oropharyngeal AND/OR nasopharyngeal swabs were POSITIVE for 2019-nCoV RNA [OR] SARS-COV-2 RNA (COVID-19) RNA     We have been unable to reach Patient by phone at this time to notify of their Positive COVID-19 result.  Left voicemail message requesting a call back to [phone] [facility] for results.        POSITIVE COVID-19 Letter sent.    [Name]  Melissa Castro RN  [facility] Nurse Advisor  11/5/2020 at 4:02 PM

## 2020-12-17 DIAGNOSIS — E03.9 HYPOTHYROIDISM, UNSPECIFIED TYPE: ICD-10-CM

## 2020-12-17 RX ORDER — LEVOTHYROXINE SODIUM 100 UG/1
TABLET ORAL
Qty: 90 TABLET | Refills: 0 | Status: SHIPPED | OUTPATIENT
Start: 2020-12-17 | End: 2021-03-22

## 2020-12-17 NOTE — TELEPHONE ENCOUNTER
[facility] sent Rx request for the following:      Levothyroxine Sodium 100 MCG Oral Tablet  Sig: Take 1 tablet by mouth once daily      Last Prescription Date:   9/17/2020  Last Fill Qty/Refills:         90, R-0    Last Office Visit:              11/3/2020   Future Office visit:           none    Prescription refilled per RN Medication Refill Policy.................... Dominick Pacheco RN ....................  12/17/2020   3:00 PM

## 2021-02-03 ENCOUNTER — TELEPHONE (OUTPATIENT)
Dept: INTERNAL MEDICINE | Facility: OTHER | Age: 86
End: 2021-02-03

## 2021-02-03 NOTE — TELEPHONE ENCOUNTER
After verification, patient notified.  Sloane Alvarez LPN ....................2/3/2021   3:15 PM

## 2021-02-03 NOTE — TELEPHONE ENCOUNTER
Patient is wondering if she should get the covid-19 vaccine.   Since she has reactions to other inj in the past.    Please call back.  Thank you Patricia Guerrero on 2/3/2021 at 12:29 PM

## 2021-02-15 ENCOUNTER — OFFICE VISIT (OUTPATIENT)
Dept: INTERNAL MEDICINE | Facility: OTHER | Age: 86
End: 2021-02-15
Attending: INTERNAL MEDICINE
Payer: MEDICARE

## 2021-02-15 ENCOUNTER — HOSPITAL ENCOUNTER (OUTPATIENT)
Dept: GENERAL RADIOLOGY | Facility: OTHER | Age: 86
End: 2021-02-15
Attending: INTERNAL MEDICINE
Payer: MEDICARE

## 2021-02-15 VITALS
OXYGEN SATURATION: 96 % | WEIGHT: 119 LBS | RESPIRATION RATE: 16 BRPM | SYSTOLIC BLOOD PRESSURE: 134 MMHG | DIASTOLIC BLOOD PRESSURE: 78 MMHG | TEMPERATURE: 97.9 F | HEART RATE: 60 BPM | HEIGHT: 66 IN | BODY MASS INDEX: 19.13 KG/M2

## 2021-02-15 DIAGNOSIS — R10.84 ABDOMINAL PAIN, GENERALIZED: Primary | ICD-10-CM

## 2021-02-15 DIAGNOSIS — R10.84 ABDOMINAL PAIN, GENERALIZED: ICD-10-CM

## 2021-02-15 LAB
ALBUMIN SERPL-MCNC: 4.7 G/DL (ref 3.5–5.7)
ALBUMIN UR-MCNC: NEGATIVE MG/DL
ALP SERPL-CCNC: 16 U/L (ref 34–104)
ALT SERPL W P-5'-P-CCNC: 21 U/L (ref 7–52)
ANION GAP SERPL CALCULATED.3IONS-SCNC: 6 MMOL/L (ref 3–14)
APPEARANCE UR: CLEAR
AST SERPL W P-5'-P-CCNC: 20 U/L (ref 13–39)
BACTERIA #/AREA URNS HPF: ABNORMAL /HPF
BILIRUB SERPL-MCNC: 1 MG/DL (ref 0.3–1)
BILIRUB UR QL STRIP: NEGATIVE
BUN SERPL-MCNC: 18 MG/DL (ref 7–25)
CALCIUM SERPL-MCNC: 10.1 MG/DL (ref 8.6–10.3)
CHLORIDE SERPL-SCNC: 98 MMOL/L (ref 98–107)
CO2 SERPL-SCNC: 34 MMOL/L (ref 21–31)
COLOR UR AUTO: ABNORMAL
CREAT SERPL-MCNC: 0.87 MG/DL (ref 0.6–1.2)
ERYTHROCYTE [DISTWIDTH] IN BLOOD BY AUTOMATED COUNT: 12.7 % (ref 10–15)
GFR SERPL CREATININE-BSD FRML MDRD: 62 ML/MIN/{1.73_M2}
GLUCOSE SERPL-MCNC: 107 MG/DL (ref 70–105)
GLUCOSE UR STRIP-MCNC: NEGATIVE MG/DL
HCT VFR BLD AUTO: 44.8 % (ref 35–47)
HGB BLD-MCNC: 15.1 G/DL (ref 11.7–15.7)
HGB UR QL STRIP: ABNORMAL
KETONES UR STRIP-MCNC: NEGATIVE MG/DL
LEUKOCYTE ESTERASE UR QL STRIP: ABNORMAL
MCH RBC QN AUTO: 31.8 PG (ref 26.5–33)
MCHC RBC AUTO-ENTMCNC: 33.7 G/DL (ref 31.5–36.5)
MCV RBC AUTO: 94 FL (ref 78–100)
MUCOUS THREADS #/AREA URNS LPF: PRESENT /LPF
NITRATE UR QL: NEGATIVE
PH UR STRIP: 7.5 PH (ref 5–7)
PLATELET # BLD AUTO: 207 10E9/L (ref 150–450)
POTASSIUM SERPL-SCNC: 3.8 MMOL/L (ref 3.5–5.1)
PROT SERPL-MCNC: 7.3 G/DL (ref 6.4–8.9)
RBC # BLD AUTO: 4.75 10E12/L (ref 3.8–5.2)
RBC #/AREA URNS AUTO: 7 /HPF (ref 0–2)
SODIUM SERPL-SCNC: 138 MMOL/L (ref 134–144)
SOURCE: ABNORMAL
SP GR UR STRIP: 1.01 (ref 1–1.03)
SQUAMOUS #/AREA URNS AUTO: 3 /HPF (ref 0–1)
UROBILINOGEN UR STRIP-MCNC: NORMAL MG/DL (ref 0–2)
WBC # BLD AUTO: 6.7 10E9/L (ref 4–11)
WBC #/AREA URNS AUTO: 6 /HPF (ref 0–5)

## 2021-02-15 PROCEDURE — 85027 COMPLETE CBC AUTOMATED: CPT | Mod: ZL | Performed by: INTERNAL MEDICINE

## 2021-02-15 PROCEDURE — 36415 COLL VENOUS BLD VENIPUNCTURE: CPT | Mod: ZL | Performed by: INTERNAL MEDICINE

## 2021-02-15 PROCEDURE — 74019 RADEX ABDOMEN 2 VIEWS: CPT

## 2021-02-15 PROCEDURE — G0463 HOSPITAL OUTPT CLINIC VISIT: HCPCS

## 2021-02-15 PROCEDURE — G0463 HOSPITAL OUTPT CLINIC VISIT: HCPCS | Mod: 25

## 2021-02-15 PROCEDURE — 99214 OFFICE O/P EST MOD 30 MIN: CPT | Performed by: INTERNAL MEDICINE

## 2021-02-15 PROCEDURE — 81001 URINALYSIS AUTO W/SCOPE: CPT | Mod: ZL | Performed by: INTERNAL MEDICINE

## 2021-02-15 PROCEDURE — 80053 COMPREHEN METABOLIC PANEL: CPT | Mod: ZL | Performed by: INTERNAL MEDICINE

## 2021-02-15 ASSESSMENT — PAIN SCALES - GENERAL: PAINLEVEL: MILD PAIN (2)

## 2021-02-15 ASSESSMENT — ENCOUNTER SYMPTOMS
ALLERGIC/IMMUNOLOGIC NEGATIVE: 1
CONSTITUTIONAL NEGATIVE: 1
ENDOCRINE NEGATIVE: 1
HEMATOLOGIC/LYMPHATIC NEGATIVE: 1

## 2021-02-15 ASSESSMENT — MIFFLIN-ST. JEOR: SCORE: 1002.56

## 2021-02-15 NOTE — PROGRESS NOTES
Chief Complaint: Abdominal pain    HPI: She is in today with a complaint of low abdominal pain.  This has been going on for about 5 days.  She tells me that anytime she tries to eat anything she seems like she gets extremely full and gets a tense and distended abdomen.  She has not had any diarrhea or constipation and claims that her bowels are normal and regular.  She does not have any fevers or chills.  She does not have any weight loss.  It just does not seem to be getting any better.    Her medications are unchanged.  Her health is otherwise unchanged since I last saw her in the fall when she had the Covid infection.  She is scheduled to get the Covid vaccination tomorrow and is wondering if that is something that is safe to do given her current situation.    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     EXTRACAPSULAR CATARACT EXTRATION WITH INTRAOCULAR LENS IMPLANT Bilateral     No Comments Provided     TONSILLECTOMY, ADENOIDECTOMY, COMBINED      No Comments Provided       Allergies   Allergen Reactions     Trazodone Shortness Of Breath and Unknown     Codeine Other (See Comments)     hallucinations     Lisinopril Cough     Pneumococcal 13-Bertha Conj Vacc Other (See Comments)     Arm swelling          Current Outpatient Medications   Medication Sig Dispense Refill     acetaminophen (TYLENOL) 500 MG tablet Take 1,000 mg by mouth every 6 hours as needed Max acetaminophen dose: 4000 mg in 24 hours       amLODIPine (NORVASC) 5 MG tablet Take 1 tablet (5 mg) by mouth daily 90 tablet 3     aspirin (GOODSENSE ASPIRIN) 325 MG tablet Take 325 mg by mouth daily with food       atenolol (TENORMIN) 100 MG tablet Take 1 tablet (100 mg) by mouth daily 90 tablet 3     Calcium Carbonate-Vit D-Min (CALCIUM 600+D PLUS MINERALS) 600-400 MG-UNIT TABS Take 1 tablet by mouth daily with food       hydrochlorothiazide (HYDRODIURIL) 25 MG tablet Take 1 tablet by mouth once daily 90 tablet 3     levothyroxine (SYNTHROID/LEVOTHROID) 100 MCG tablet Take 1 tablet by mouth once daily 90 tablet 0     simvastatin (ZOCOR) 40 MG tablet Take 1 tablet (40 mg) by mouth At Bedtime 90 tablet 3     zolpidem (AMBIEN) 5 MG tablet Take 1 tablet (5 mg) by mouth nightly as needed 30 tablet 5       Social History     Socioeconomic History     Marital status:      Spouse name: Not on file     Number of children: Not on file     Years of education: Not on file     Highest education level: Not on file   Occupational History     Not on file   Social Needs     Financial resource strain: Not on file     Food insecurity     Worry: Not on file     Inability: Not on file     Transportation needs     Medical: Not on file     Non-medical: Not on file   Tobacco Use     Smoking status: Former Smoker     Types: Cigarettes     Quit date: 3/12/1990     Years since quittin.9     Smokeless tobacco: Never Used   Substance and Sexual Activity     Alcohol use: Yes     Frequency: 2-4 times a month     Drinks per session: 1 or 2     Comment: occ wine     Drug use: No     Sexual activity: Not Currently   Lifestyle     Physical activity     Days per week: Not on file     Minutes per session: Not on file     Stress: Not on file   Relationships     Social connections      Talks on phone: Not on file     Gets together: Not on file     Attends Voodoo service: Not on file     Active member of club or organization: Not on file     Attends meetings of clubs or organizations: Not on file     Relationship status: Not on file     Intimate partner violence     Fear of current or ex partner: Not on file     Emotionally abused: Not on file     Physically abused: Not on file     Forced sexual activity: Not on file   Other Topics Concern     Parent/sibling w/ CABG, MI or angioplasty before 65F 55M? Not Asked   Social History Narrative    .    of heart disease.  She is a retired  and travels with her daughter who lives in [place].  Retired from [employer].  Lives in town.  Two children.       Review of Systems   Constitutional: Negative.    Endocrine: Negative.    Skin: Negative.    Allergic/Immunologic: Negative.    Hematological: Negative.        Physical Exam  Vitals signs and nursing note reviewed.   Constitutional:       General: She is not in acute distress.     Appearance: Normal appearance. She is not ill-appearing, toxic-appearing or diaphoretic.   Pulmonary:      Effort: Pulmonary effort is normal.      Breath sounds: Normal breath sounds.   Abdominal:      General: Abdomen is flat. There is distension.      Palpations: Abdomen is soft.      Tenderness: There is abdominal tenderness.      Comments: Diffuse mid to lower abdominal tenderness.  Somewhat diminished in somewhat tinkling bowel sounds.   Genitourinary:     Comments: Rectal exam was normal.  Neurological:      Mental Status: She is alert.         Assessment:      ICD-10-CM    1. Abdominal pain, generalized  R10.84 XR Abdomen 2 Views     Comprehensive metabolic panel     CBC with platelets     UA with Microscopic reflex to Culture     CT Abdomen Pelvis w Contrast       Plan: This patient gives symptoms that were almost suspicious for partial obstruction but her x-ray today did not show that.  I  thought that there was maybe an abnormality near the rectal vault on x-ray but digital rectal exam was normal.  I think that further testing is indicated and warranted.  Lab work including urine is pending and I will get her scheduled for a CT scan of the abdomen and pelvis.  We will proceed depending on the findings and her clinical course.

## 2021-02-15 NOTE — PROGRESS NOTES
Bea is a 85 year old who is being evaluated via a billable telephone visit.          Assessment & Plan   Problem List Items Addressed This Visit     None      Visit Diagnoses     Abdominal pain, generalized    -  Primary                      No follow-ups on file.    AJITH HOWE MD  [facility]    Subjective   Bea is a 85 year old who presents for the following health issues--questions regarding Covid.    HPI     She called today not feeling well.  After a brief conversation, it was evident that she needed to be seen      Review of Systems         Objective           Vitals:  No vitals were obtained today due to virtual visit.    Physical Exam     PSYCH: Alert and oriented times 3; coherent speech, normal   rate and volume, able to articulate logical thoughts, able   to abstract reason, no tangential thoughts, no hallucinations   or delusions    RESP: No cough, no audible wheezing, able to talk in full sentences  Remainder of exam unable to be completed due to telephone visits    She will be in for an office visit.        Phone call duration: 3 minutes

## 2021-02-15 NOTE — NURSING NOTE
Patient presents to clinic today for abdominal pain and concern about getting COVID shot tomorrow.  Medication reconciliation completed.  Malena Naik CMA([facility])..................2/15/2021   3:10 PM

## 2021-02-18 ENCOUNTER — HOSPITAL ENCOUNTER (OUTPATIENT)
Dept: CT IMAGING | Facility: OTHER | Age: 86
Discharge: HOME | End: 2021-02-18
Attending: INTERNAL MEDICINE | Admitting: INTERNAL MEDICINE
Payer: MEDICARE

## 2021-02-18 DIAGNOSIS — R10.84 ABDOMINAL PAIN, GENERALIZED: ICD-10-CM

## 2021-02-18 PROCEDURE — 255N000002 HC RX 255 OP 636: Performed by: INTERNAL MEDICINE

## 2021-02-18 PROCEDURE — 74177 CT ABD & PELVIS W/CONTRAST: CPT

## 2021-02-18 RX ADMIN — IOHEXOL 100 ML: 350 INJECTION, SOLUTION INTRAVENOUS at 16:25

## 2021-03-22 DIAGNOSIS — E03.9 HYPOTHYROIDISM, UNSPECIFIED TYPE: ICD-10-CM

## 2021-03-22 RX ORDER — LEVOTHYROXINE SODIUM 100 UG/1
TABLET ORAL
Qty: 90 TABLET | Refills: 1 | Status: SHIPPED | OUTPATIENT
Start: 2021-03-22 | End: 2021-08-18

## 2021-03-22 NOTE — TELEPHONE ENCOUNTER
"Requested Prescriptions   Pending Prescriptions Disp Refills     levothyroxine (SYNTHROID/LEVOTHROID) 100 MCG tablet [Pharmacy Med Name: Levothyroxine Sodium 100 MCG Oral Tablet] 90 tablet 0     Sig: Take 1 tablet by mouth once daily       Thyroid Protocol Passed - 3/22/2021  9:43 AM        Passed - Patient is 12 years or older        Passed - Recent (12 mo) or future (30 days) visit within the authorizing provider's specialty     Patient has had an office visit with the authorizing provider or a provider within the authorizing providers department within the previous 12 mos or has a future within next 30 days. See \"Patient Info\" tab in inbasket, or \"Choose Columns\" in Meds & Orders section of the refill encounter.              Passed - Medication is active on med list        Passed - Normal TSH on file in past 12 months     No lab results found.           Thyrotropin 0.34 - 5.60 IU/mL 0.47  11/3/20              Passed - No active pregnancy on record     If patient is pregnant or has had a positive pregnancy test, please check TSH.          Passed - No positive pregnancy test in past 12 months     If patient is pregnant or has had a positive pregnancy test, please check TSH.           LOV-2/15/21 and remains current on medication list.  Prescription refilled per RN Medication RefillPolicy.................... Joslyn Lester RN ....................  3/22/2021   10:56 AM      "

## 2021-05-04 ENCOUNTER — APPOINTMENT (OUTPATIENT)
Dept: OTOLARYNGOLOGY | Facility: OTHER | Age: 86
End: 2021-05-04
Attending: OTOLARYNGOLOGY
Payer: COMMERCIAL

## 2021-06-15 DIAGNOSIS — F41.9 ANXIETY: ICD-10-CM

## 2021-06-16 RX ORDER — ZOLPIDEM TARTRATE 5 MG/1
TABLET ORAL
Qty: 30 TABLET | Refills: 3 | Status: SHIPPED | OUTPATIENT
Start: 2021-06-16 | End: 2021-10-07

## 2021-06-16 NOTE — TELEPHONE ENCOUNTER
Routing refill request to provider for review/approval because:  Drug not on the [facility] refill protocol .      LOV: 2/15/2021  Judy Alvarado RN on 6/16/2021 at 11:27 AM

## 2021-07-18 DIAGNOSIS — I10 ESSENTIAL HYPERTENSION: ICD-10-CM

## 2021-07-20 RX ORDER — AMLODIPINE BESYLATE 5 MG/1
TABLET ORAL
Qty: 90 TABLET | Refills: 1 | Status: SHIPPED | OUTPATIENT
Start: 2021-07-20 | End: 2021-08-18

## 2021-07-20 NOTE — TELEPHONE ENCOUNTER
"Requested Prescriptions   Pending Prescriptions Disp Refills     amLODIPine (NORVASC) 5 MG tablet [Pharmacy Med Name: amLODIPine Besylate 5 MG Oral Tablet] 90 tablet 0     Sig: Take 1 tablet by mouth once daily       Calcium Channel Blockers Protocol  Passed - 7/18/2021  6:30 AM        Passed - Blood pressure under 140/90 in past 12 months     BP Readings from Last 3 Encounters:   02/15/21 134/78   11/03/20 (!) 152/70   06/26/20 132/68                 Passed - Recent (12 mo) or future (30 days) visit within the authorizing provider's specialty     Patient has had an office visit with the authorizing provider or a provider within the authorizing providers department within the previous 12 mos or has a future within next 30 days. See \"Patient Info\" tab in inbasket, or \"Choose Columns\" in Meds & Orders section of the refill encounter.              Passed - Medication is active on med list        Passed - Patient is age 18 or older        Passed - No active pregnancy on record        Passed - Normal serum creatinine on file in past 12 months     Recent Labs   Lab Test 02/15/21  1550   CR 0.87       Ok to refill medication if creatinine is low          Passed - No positive pregnancy test in past 12 months           LOV 2/15/21 Giles  Prescription approved per [facility] Refill Protocol.  Brenda J. Goodell, RN on 7/20/2021 at 9:08 AM    "

## 2021-08-01 DIAGNOSIS — I10 ESSENTIAL HYPERTENSION: Primary | ICD-10-CM

## 2021-08-02 NOTE — TELEPHONE ENCOUNTER
Requested Prescriptions   Pending Prescriptions Disp Refills     atenolol (TENORMIN) 50 MG tablet [Pharmacy Med Name: Atenolol 50 MG Oral Tablet] 180 tablet 0     Sig: TAKE 2 TABLETS (100 MG) BY MOUTH ONCE DAILY     Last Written Prescription Date:  6/26/20 Tisha  Last Fill Quantity: 90,   # refills: 3  Last Office Visit: 3/15/21 Tisha  Future Office visit:    Next 5 appointments (look out 90 days)    Aug 18, 2021 11:00 AM  PHYSICAL with Evan Giles MD  [facility] ([facility] [address]  [place] [address]  [phone]        Routing refill request to provider for review/approval because:  New RN protocol.  Brenda J. Goodell, RN on 8/2/2021 at 3:09 PM

## 2021-08-06 DIAGNOSIS — I10 ESSENTIAL HYPERTENSION: ICD-10-CM

## 2021-08-06 RX ORDER — ATENOLOL 50 MG/1
TABLET ORAL
Qty: 180 TABLET | Refills: 0 | Status: SHIPPED | OUTPATIENT
Start: 2021-08-06 | End: 2021-08-18

## 2021-08-09 RX ORDER — ATENOLOL 100 MG/1
TABLET ORAL
Qty: 90 TABLET | Refills: 0 | OUTPATIENT
Start: 2021-08-09

## 2021-08-09 NOTE — TELEPHONE ENCOUNTER
[facility] sent Rx request for the following:     Requested Prescriptions   Pending Prescriptions Disp Refills     atenolol (TENORMIN) 100 MG tablet [Pharmacy Med Name: Atenolol 100 MG Oral Tablet] 90 tablet 0     Sig: Take 1 tablet by mouth once daily         Last Prescription Date:   8/6/2021  Last Fill Qty/Refills:         180, R-0    Last Office Visit:              2/15/2021  Future Office visit:           8/18/2021  Prescription refused.  This is a duplicate request.  Ca Tate RN.......8/9/2021 3:44 PM

## 2021-08-13 DIAGNOSIS — E78.5 HYPERLIPIDEMIA, UNSPECIFIED HYPERLIPIDEMIA TYPE: ICD-10-CM

## 2021-08-17 RX ORDER — SIMVASTATIN 40 MG
TABLET ORAL
Qty: 90 TABLET | Refills: 0 | Status: SHIPPED | OUTPATIENT
Start: 2021-08-17 | End: 2021-08-18

## 2021-08-18 ENCOUNTER — OFFICE VISIT (OUTPATIENT)
Dept: INTERNAL MEDICINE | Facility: OTHER | Age: 86
End: 2021-08-18
Attending: INTERNAL MEDICINE
Payer: COMMERCIAL

## 2021-08-18 VITALS
HEIGHT: 66 IN | OXYGEN SATURATION: 97 % | RESPIRATION RATE: 20 BRPM | SYSTOLIC BLOOD PRESSURE: 136 MMHG | BODY MASS INDEX: 19.22 KG/M2 | HEART RATE: 63 BPM | DIASTOLIC BLOOD PRESSURE: 70 MMHG | TEMPERATURE: 98.5 F | WEIGHT: 119.6 LBS

## 2021-08-18 DIAGNOSIS — E03.9 HYPOTHYROIDISM, UNSPECIFIED TYPE: ICD-10-CM

## 2021-08-18 DIAGNOSIS — F51.01 PRIMARY INSOMNIA: ICD-10-CM

## 2021-08-18 DIAGNOSIS — I63.50 CEREBRAL ARTERY OCCLUSION WITH CEREBRAL INFARCTION (H): ICD-10-CM

## 2021-08-18 DIAGNOSIS — I10 ESSENTIAL HYPERTENSION: ICD-10-CM

## 2021-08-18 DIAGNOSIS — E78.5 HYPERLIPIDEMIA, UNSPECIFIED HYPERLIPIDEMIA TYPE: Primary | ICD-10-CM

## 2021-08-18 DIAGNOSIS — F41.9 ANXIETY: ICD-10-CM

## 2021-08-18 DIAGNOSIS — Z12.31 ENCOUNTER FOR SCREENING MAMMOGRAM FOR BREAST CANCER: ICD-10-CM

## 2021-08-18 LAB
ALBUMIN SERPL-MCNC: 4.4 G/DL (ref 3.5–5.7)
ALP SERPL-CCNC: 22 U/L (ref 34–104)
ALT SERPL W P-5'-P-CCNC: 24 U/L (ref 7–52)
ANION GAP SERPL CALCULATED.3IONS-SCNC: 5 MMOL/L (ref 3–14)
AST SERPL W P-5'-P-CCNC: 22 U/L (ref 13–39)
BILIRUB SERPL-MCNC: 0.8 MG/DL (ref 0.3–1)
BUN SERPL-MCNC: 24 MG/DL (ref 7–25)
CALCIUM SERPL-MCNC: 10 MG/DL (ref 8.6–10.3)
CHLORIDE BLD-SCNC: 98 MMOL/L (ref 98–107)
CHOLEST SERPL-MCNC: 131 MG/DL
CO2 SERPL-SCNC: 33 MMOL/L (ref 21–31)
CREAT SERPL-MCNC: 0.85 MG/DL (ref 0.6–1.2)
FASTING STATUS PATIENT QL REPORTED: ABNORMAL
GFR SERPL CREATININE-BSD FRML MDRD: 63 ML/MIN/1.73M2
GLUCOSE BLD-MCNC: 96 MG/DL (ref 70–105)
HDLC SERPL-MCNC: 40 MG/DL (ref 23–92)
HGB BLD-MCNC: 14.2 G/DL (ref 11.7–15.7)
LDLC SERPL CALC-MCNC: 53 MG/DL
NONHDLC SERPL-MCNC: 91 MG/DL
POTASSIUM BLD-SCNC: 4.4 MMOL/L (ref 3.5–5.1)
PROT SERPL-MCNC: 6.9 G/DL (ref 6.4–8.9)
SODIUM SERPL-SCNC: 136 MMOL/L (ref 134–144)
T4 FREE SERPL-MCNC: 1.11 NG/DL (ref 0.6–1.6)
TRIGL SERPL-MCNC: 188 MG/DL
TSH SERPL DL<=0.005 MIU/L-ACNC: 0.15 MU/L (ref 0.4–4)

## 2021-08-18 PROCEDURE — G0439 PPPS, SUBSEQ VISIT: HCPCS | Performed by: INTERNAL MEDICINE

## 2021-08-18 PROCEDURE — 80053 COMPREHEN METABOLIC PANEL: CPT | Mod: ZL | Performed by: INTERNAL MEDICINE

## 2021-08-18 PROCEDURE — 84439 ASSAY OF FREE THYROXINE: CPT | Mod: ZL | Performed by: INTERNAL MEDICINE

## 2021-08-18 PROCEDURE — 85018 HEMOGLOBIN: CPT | Mod: ZL | Performed by: INTERNAL MEDICINE

## 2021-08-18 PROCEDURE — 84443 ASSAY THYROID STIM HORMONE: CPT | Mod: ZL | Performed by: INTERNAL MEDICINE

## 2021-08-18 PROCEDURE — 36415 COLL VENOUS BLD VENIPUNCTURE: CPT | Mod: ZL | Performed by: INTERNAL MEDICINE

## 2021-08-18 PROCEDURE — 80061 LIPID PANEL: CPT | Mod: ZL | Performed by: INTERNAL MEDICINE

## 2021-08-18 RX ORDER — AMLODIPINE BESYLATE 5 MG/1
5 TABLET ORAL DAILY
Qty: 90 TABLET | Refills: 3 | Status: SHIPPED | OUTPATIENT
Start: 2021-08-18 | End: 2022-08-29

## 2021-08-18 RX ORDER — LEVOTHYROXINE SODIUM 100 UG/1
100 TABLET ORAL DAILY
Qty: 90 TABLET | Refills: 3 | Status: SHIPPED | OUTPATIENT
Start: 2021-08-18 | End: 2022-08-29

## 2021-08-18 RX ORDER — SIMVASTATIN 40 MG
40 TABLET ORAL AT BEDTIME
Qty: 90 TABLET | Refills: 3 | Status: SHIPPED | OUTPATIENT
Start: 2021-08-18 | End: 2022-08-29

## 2021-08-18 RX ORDER — ASPIRIN 325 MG
162.5 TABLET ORAL
COMMUNITY
Start: 2021-08-18

## 2021-08-18 RX ORDER — HYDROCHLOROTHIAZIDE 25 MG/1
25 TABLET ORAL DAILY
Qty: 90 TABLET | Refills: 3 | Status: SHIPPED | OUTPATIENT
Start: 2021-08-18 | End: 2022-08-29

## 2021-08-18 RX ORDER — ATENOLOL 100 MG/1
100 TABLET ORAL DAILY
Qty: 90 TABLET | Refills: 3 | Status: SHIPPED | OUTPATIENT
Start: 2021-08-18 | End: 2022-08-29

## 2021-08-18 ASSESSMENT — MIFFLIN-ST. JEOR: SCORE: 1014.63

## 2021-08-18 ASSESSMENT — PAIN SCALES - GENERAL: PAINLEVEL: NO PAIN (0)

## 2021-08-18 NOTE — PROGRESS NOTES
SUBJECTIVE:   Bea Flores is a 85 year old female who presents for Preventive Visit.      Patient has been advised of split billing requirements and indicates understanding: Yes   Are you in the first 12 months of your Medicare coverage?  No    HPI     She is in today for Medicare wellness physical.  She feels fine at this time.  She has treated hypothyroidism and treated hyperlipidemia.  She is due for recheck on some lab work.  She has treated hypertension with good control and tolerance of her medication.    She has chronic insomnia and anxiety.  She takes Ambien every night.  If she does not take it she cannot sleep.    Medications are reconciled.  Past medical history, past surgical history, family history and social histories are reviewed and updated.  Immunizations are up-to-date.  She is due for mammogram.    Do you feel safe in your environment? Yes    Have you ever done Advance Care Planning? (For example, a Health Directive, POLST, or a discussion with a medical provider or your loved ones about your wishes): No, advance care planning information given to patient to review.  Patient declined advance care planning discussion at this time.       Fall risk  Fallen 2 or more times in the past year?: No  Any fall with injury in the past year?: No    Cognitive Screening   1) Repeat 3 items (Leader, Season, Table)    2) Clock draw: NORMAL  3) 3 item recall: Recalls 3 objects  Results: 3 items recalled: COGNITIVE IMPAIRMENT LESS LIKELY    Mini-CogTM Copyright S Mirza. Licensed by the author for use in [facility]; reprinted with permission (audelia@.[facility]). All rights reserved.      Do you have sleep apnea, excessive snoring or daytime drowsiness?: no    Reviewed and updated as needed this visit by clinical staff  Tobacco  Allergies  Meds  Problems  Med Hx  Surg Hx  Fam Hx          Reviewed and updated as needed this visit by Provider  Tobacco  Allergies  Meds  Problems  Med Hx  Surg Hx   "Fam Hx         Social History     Tobacco Use     Smoking status: Former Smoker     Types: Cigarettes     Quit date: 3/12/1990     Years since quittin.4     Smokeless tobacco: Never Used   Substance Use Topics     Alcohol use: Yes     Comment: occ wine     If you drink alcohol do you typically have >3 drinks per day or >7 drinks per week? Not applicable    Alcohol Use 2021   Prescreen: >3 drinks/day or >7 drinks/week? Not Applicable           Current Outpatient Medications   Medication     acetaminophen (TYLENOL) 500 MG tablet     amLODIPine (NORVASC) 5 MG tablet     aspirin (GOODSENSE ASPIRIN) 325 MG tablet     atenolol (TENORMIN) 100 MG tablet     Calcium Carbonate-Vit D-Min (CALCIUM 600+D PLUS MINERALS) 600-400 MG-UNIT TABS     hydrochlorothiazide (HYDRODIURIL) 25 MG tablet     levothyroxine (SYNTHROID/LEVOTHROID) 100 MCG tablet     simvastatin (ZOCOR) 40 MG tablet     zolpidem (AMBIEN) 5 MG tablet     No current facility-administered medications for this visit.         Current providers sharing in care for this patient include:   Patient Care Team:  Evan Giles MD as PCP - General (Internal Medicine)  Evan Giles MD as Assigned PCP    The following health maintenance items are reviewed in Epic and correct as of today:  There are no preventive care reminders to display for this patient.  Lab work is in process        Pertinent mammograms are reviewed under the imaging tab.    Review of Systems  Constitutional, HEENT, cardiovascular, pulmonary, GI, , musculoskeletal, neuro, skin, endocrine and psych systems are negative, except as otherwise noted.    OBJECTIVE:   /70   Pulse 63   Temp 98.5  F (36.9  C) (Tympanic)   Resp 20   Ht 1.685 m (5' .34\")   Wt 54.3 kg (119 lb 9.6 oz)   SpO2 97%   Breastfeeding No   BMI 19.11 kg/m   Estimated body mass index is 19.11 kg/m  as calculated from the following:    Height as of this encounter: 1.685 m (5' 6.34\").    Weight as of this encounter: " 54.3 kg (119 lb 9.6 oz).  Physical Exam  GENERAL APPEARANCE: healthy, alert and no distress  EYES: Eyes grossly normal to inspection, PERRL and conjunctivae and sclerae normal  HENT: ear canals and TM's normal, nose and mouth without ulcers or lesions, oropharynx clear and oral mucous membranes moist  NECK: no adenopathy, no asymmetry, masses, or scars and thyroid normal to palpation  RESP: lungs clear to auscultation - no rales, rhonchi or wheezes  BREAST: normal without masses, tenderness or nipple discharge and no palpable axillary masses or adenopathy  CV: regular rate and rhythm, normal S1 S2, no S3 or S4, no murmur, click or rub, no peripheral edema and peripheral pulses strong  ABDOMEN: soft, nontender, no hepatosplenomegaly, no masses and bowel sounds normal  MS: no musculoskeletal defects are noted and gait is age appropriate without ataxia  SKIN: no suspicious lesions or rashes  NEURO: Normal strength and tone, sensory exam grossly normal, mentation intact and speech normal  PSYCH: mentation appears normal and affect normal/bright        ASSESSMENT / PLAN:       ICD-10-CM    1. Hyperlipidemia, unspecified hyperlipidemia type  E78.5 Comprehensive metabolic panel     Lipid Panel   2. Hypothyroidism, unspecified type  E03.9 TSH     T4, Free   3. Essential hypertension  I10    4. Cerebral artery occlusion with cerebral infarction (H)  I63.50 Hemoglobin   5. Primary insomnia  F51.01    6. Anxiety  F41.9    7. Encounter for screening mammogram for breast cancer  Z12.31 MA Screening Digital Bilateral       Patient has been advised of split billing requirements and indicates understanding: Yes  COUNSELING:    She appears to be healthy and stable.  Medications will continue without change.  Lab is pending, I will send her a letter with the results.  If everything goes well she will follow-up in 6 months for her Ambien refill.    Reviewed preventive health counseling, as reflected in patient instructions        "Regular exercise       Healthy diet/nutrition    Estimated body mass index is 19.11 kg/m  as calculated from the following:    Height as of this encounter: 1.685 m (5' 6.34\").    Weight as of this encounter: 54.3 kg (119 lb 9.6 oz).        She reports that she quit smoking about 31 years ago. Her smoking use included cigarettes. She has never used smokeless tobacco.      Appropriate preventive services were discussed with this patient, including applicable screening as appropriate for cardiovascular disease, diabetes, osteopenia/osteoporosis, and glaucoma.  As appropriate for age/gender, discussed screening for colorectal cancer, prostate cancer, breast cancer, and cervical cancer. Checklist reviewing preventive services available has been given to the patient.    Reviewed patients plan of care and provided an AVS. The Basic Care Plan (routine screening as documented in Health Maintenance) for Bea meets the Care Plan requirement. This Care Plan has been established and reviewed with the Patient.    Counseling Resources:  ATP IV Guidelines  Pooled Cohorts Equation Calculator  Breast Cancer Risk Calculator  Breast Cancer: Medication to Reduce Risk  FRAX Risk Assessment  ICSI Preventive Guidelines  Dietary Guidelines for Americans, 2010  USDA's MyPlate  ASA Prophylaxis  Lung CA Screening    AJITH HOWE MD  [facility]    Identified Health Risks:  "

## 2021-08-18 NOTE — PATIENT INSTRUCTIONS
Continue your current medications.    Blood tests today, I will send you a letter with the results.    Mammogram has been ordered.    If all goes well, follow-up in 6 months for refill on the Ambien.

## 2021-08-18 NOTE — LETTER
August 18, 2021      Bea Flores  [id number] [facility] [facility] [address]        Dear Bea,    Below are the results of your recent labs:    Results for orders placed or performed in visit on 08/18/21   Hemoglobin     Status: Normal   Result Value Ref Range    Hemoglobin 14.2 11.7 - 15.7 g/dL   T4, Free     Status: Normal   Result Value Ref Range    Free T4 1.11 0.60 - 1.60 ng/dL   TSH     Status: Abnormal   Result Value Ref Range    TSH 0.15 (L) 0.40 - 4.00 mU/L   Lipid Panel     Status: Abnormal   Result Value Ref Range    Cholesterol 131 <200 mg/dL    Triglycerides 188 (H) <150 mg/dL    Direct Measure HDL 40 23 - 92 mg/dL    LDL Cholesterol Calculated 53 <=100 mg/dL    Non HDL Cholesterol 91 <130 mg/dL    Patient Fasting > 8hrs? Unknown    Comprehensive metabolic panel     Status: Abnormal   Result Value Ref Range    Sodium 136 134 - 144 mmol/L    Potassium 4.4 3.5 - 5.1 mmol/L    Chloride 98 98 - 107 mmol/L    Carbon Dioxide (CO2) 33 (H) 21 - 31 mmol/L    Anion Gap 5 3 - 14 mmol/L    Urea Nitrogen 24 7 - 25 mg/dL    Creatinine 0.85 0.60 - 1.20 mg/dL    Calcium 10.0 8.6 - 10.3 mg/dL    Glucose 96 70 - 105 mg/dL    Alkaline Phosphatase 22 (L) 34 - 104 U/L    AST 22 13 - 39 U/L    ALT 24 7 - 52 U/L    Protein Total 6.9 6.4 - 8.9 g/dL    Albumin 4.4 3.5 - 5.7 g/dL    Bilirubin Total 0.8 0.3 - 1.0 mg/dL    GFR Estimate 63 >60 mL/min/1.73m2        Overall, your blood tests look fine.  Your TSH is a little bit on the low side which might suggest that your thyroid replacement dosing is a little bit excessive but I think for the time being it is close enough and we will not make any changes.  I am satisfied with your results.  If you have any questions about any of your results, feel free to contact me.    Sincerely,        Evan Giles MD  Internal Medicine  [facility]

## 2021-08-26 ENCOUNTER — HOSPITAL ENCOUNTER (OUTPATIENT)
Dept: MAMMOGRAPHY | Facility: OTHER | Age: 86
Discharge: HOME | End: 2021-08-26
Attending: INTERNAL MEDICINE | Admitting: INTERNAL MEDICINE
Payer: MEDICARE

## 2021-08-26 DIAGNOSIS — Z12.31 ENCOUNTER FOR SCREENING MAMMOGRAM FOR BREAST CANCER: ICD-10-CM

## 2021-08-26 PROCEDURE — 77063 BREAST TOMOSYNTHESIS BI: CPT

## 2021-09-24 DIAGNOSIS — E03.9 HYPOTHYROIDISM, UNSPECIFIED TYPE: ICD-10-CM

## 2021-09-27 RX ORDER — LEVOTHYROXINE SODIUM 100 UG/1
TABLET ORAL
Qty: 90 TABLET | Refills: 0 | OUTPATIENT
Start: 2021-09-27

## 2021-09-27 NOTE — TELEPHONE ENCOUNTER
Redundant refill request refused: Too soon: last filled on 8/18/21     Requested Prescriptions   Pending Prescriptions Disp Refills     levothyroxine (SYNTHROID/LEVOTHROID) 100 MCG tablet [Pharmacy Med Name: Levothyroxine Sodium 100 MCG Oral Tablet] 90 tablet 0     Sig: Take 1 tablet by mouth once daily     Last Prescription Date:   8/18/21  Last Fill Qty/Refills:         90, R-3    Grace Roberto RN .............. 9/27/2021  1:28 PM

## 2021-10-07 DIAGNOSIS — F41.9 ANXIETY: ICD-10-CM

## 2021-10-07 RX ORDER — ZOLPIDEM TARTRATE 5 MG/1
TABLET ORAL
Qty: 30 TABLET | Refills: 0 | Status: SHIPPED | OUTPATIENT
Start: 2021-10-07 | End: 2021-12-16

## 2021-12-07 ENCOUNTER — APPOINTMENT (OUTPATIENT)
Dept: OTOLARYNGOLOGY | Facility: OTHER | Age: 86
End: 2021-12-07
Attending: INTERNAL MEDICINE
Payer: COMMERCIAL

## 2021-12-14 DIAGNOSIS — F41.9 ANXIETY: ICD-10-CM

## 2021-12-16 RX ORDER — ZOLPIDEM TARTRATE 5 MG/1
TABLET ORAL
Qty: 30 TABLET | Refills: 0 | Status: SHIPPED | OUTPATIENT
Start: 2021-12-16 | End: 2022-03-01

## 2022-01-16 DIAGNOSIS — I10 ESSENTIAL HYPERTENSION: ICD-10-CM

## 2022-01-18 RX ORDER — AMLODIPINE BESYLATE 5 MG/1
TABLET ORAL
Qty: 90 TABLET | Refills: 0 | OUTPATIENT
Start: 2022-01-18

## 2022-01-18 NOTE — TELEPHONE ENCOUNTER
Filled 08/18/2021 #90 x 3. Due 08/18/2022. Pharmacy alerted. Unable to complete prescription refill per RNMedication Refill Policy.................... Mirna Moreau RN ....................  1/18/2022   1:40 PM    amLODIPine (NORVASC) 5 MG tablet 90 tablet 3 8/18/2021  No   Sig - Route: Take 1 tablet (5 mg) by mouth daily - Oral   Sent to pharmacy as: amLODIPine Besylate 5 MG Oral Tablet (NORVASC)   Class: E-Prescribe   Order: [id number]   E-Prescribing Status: Receipt confirmed by pharmacy (8/18/2021 11:26 AM CDT)       [facility] - [address]

## 2022-03-01 ENCOUNTER — NURSE TRIAGE (OUTPATIENT)
Dept: INTERNAL MEDICINE | Facility: OTHER | Age: 87
End: 2022-03-01
Payer: COMMERCIAL

## 2022-03-01 ENCOUNTER — OFFICE VISIT (OUTPATIENT)
Dept: INTERNAL MEDICINE | Facility: OTHER | Age: 87
End: 2022-03-01
Attending: NURSE PRACTITIONER
Payer: COMMERCIAL

## 2022-03-01 VITALS
OXYGEN SATURATION: 96 % | BODY MASS INDEX: 20.36 KG/M2 | TEMPERATURE: 98 F | SYSTOLIC BLOOD PRESSURE: 136 MMHG | HEIGHT: 65 IN | RESPIRATION RATE: 16 BRPM | DIASTOLIC BLOOD PRESSURE: 76 MMHG | WEIGHT: 122.2 LBS | HEART RATE: 62 BPM

## 2022-03-01 DIAGNOSIS — E03.9 HYPOTHYROIDISM, UNSPECIFIED TYPE: ICD-10-CM

## 2022-03-01 DIAGNOSIS — F41.9 ANXIETY: Primary | ICD-10-CM

## 2022-03-01 DIAGNOSIS — I10 ESSENTIAL HYPERTENSION: ICD-10-CM

## 2022-03-01 LAB
T4 FREE SERPL-MCNC: 1.07 NG/DL (ref 0.6–1.6)
TSH SERPL DL<=0.005 MIU/L-ACNC: 0.63 MU/L (ref 0.4–4)

## 2022-03-01 PROCEDURE — 36415 COLL VENOUS BLD VENIPUNCTURE: CPT | Mod: ZL | Performed by: NURSE PRACTITIONER

## 2022-03-01 PROCEDURE — G0463 HOSPITAL OUTPT CLINIC VISIT: HCPCS

## 2022-03-01 PROCEDURE — 84443 ASSAY THYROID STIM HORMONE: CPT | Mod: ZL | Performed by: NURSE PRACTITIONER

## 2022-03-01 PROCEDURE — 99214 OFFICE O/P EST MOD 30 MIN: CPT | Performed by: NURSE PRACTITIONER

## 2022-03-01 PROCEDURE — 84439 ASSAY OF FREE THYROXINE: CPT | Mod: ZL | Performed by: NURSE PRACTITIONER

## 2022-03-01 RX ORDER — CITALOPRAM HYDROBROMIDE 10 MG/1
10 TABLET ORAL DAILY
Qty: 90 TABLET | Refills: 3 | Status: SHIPPED | OUTPATIENT
Start: 2022-03-01 | End: 2023-03-22

## 2022-03-01 RX ORDER — LATANOPROST 50 UG/ML
1 SOLUTION/ DROPS OPHTHALMIC DAILY
COMMUNITY
Start: 2021-11-23 | End: 2022-08-29

## 2022-03-01 ASSESSMENT — ENCOUNTER SYMPTOMS
FACIAL ASYMMETRY: 0
UNEXPECTED WEIGHT CHANGE: 0
PALPITATIONS: 0
LIGHT-HEADEDNESS: 0
DIARRHEA: 0
SLEEP DISTURBANCE: 1
NAUSEA: 0
DIZZINESS: 0
HEADACHES: 1
NERVOUS/ANXIOUS: 1

## 2022-03-01 ASSESSMENT — ANXIETY QUESTIONNAIRES
GAD7 TOTAL SCORE: 5
7. FEELING AFRAID AS IF SOMETHING AWFUL MIGHT HAPPEN: NOT AT ALL
GAD7 TOTAL SCORE: 5
1. FEELING NERVOUS, ANXIOUS, OR ON EDGE: SEVERAL DAYS
3. WORRYING TOO MUCH ABOUT DIFFERENT THINGS: SEVERAL DAYS
2. NOT BEING ABLE TO STOP OR CONTROL WORRYING: SEVERAL DAYS
GAD7 TOTAL SCORE: 5
7. FEELING AFRAID AS IF SOMETHING AWFUL MIGHT HAPPEN: NOT AT ALL
5. BEING SO RESTLESS THAT IT IS HARD TO SIT STILL: NOT AT ALL
6. BECOMING EASILY ANNOYED OR IRRITABLE: SEVERAL DAYS
4. TROUBLE RELAXING: SEVERAL DAYS

## 2022-03-01 ASSESSMENT — PAIN SCALES - GENERAL: PAINLEVEL: NO PAIN (0)

## 2022-03-01 NOTE — TELEPHONE ENCOUNTER
Contacted patient and informed of physician's response. Patient agreed to transferred to scheduling to set up a visit for today. Mirna Moreau RN on 3/1/2022 at 11:44 AM

## 2022-03-01 NOTE — LETTER
March 1, 2022       Bea Flores  [address] [facility] [address]        Dear ,    We are writing to inform you of your test results.    Your recent thyroid test are normal.    Resulted Orders   T4 free   Result Value Ref Range    Free T4 1.07 0.60 - 1.60 ng/dL   TSH   Result Value Ref Range    TSH 0.63 0.40 - 4.00 mU/L       If you have any questions or concerns, please call the clinic at the number listed above.       Sincerely,      Rayna Correa NP

## 2022-03-01 NOTE — TELEPHONE ENCOUNTER
MAF-patient states blood pressure is high please call and advise    Thank You    Cristina Rincon on 3/1/2022 at 10:45 AM

## 2022-03-01 NOTE — NURSING NOTE
"Chief Complaint   Patient presents with     Hypertension         Initial /76   Pulse 62   Temp 98  F (36.7  C) (Temporal)   Resp 16   Ht 1.638 m (5' 4.5\")   Wt 55.4 kg (122 lb 3.2 oz)   SpO2 96%   Breastfeeding No   BMI 20.65 kg/m   Estimated body mass index is 20.65 kg/m  as calculated from the following:    Height as of this encounter: 1.638 m (5' 4.5\").    Weight as of this encounter: 55.4 kg (122 lb 3.2 oz).         Norma J. Gosselin, CLAUS   "

## 2022-03-01 NOTE — TELEPHONE ENCOUNTER
"S-(situation): Per call center-\"MAF-patient states blood pressure is high please call and advise\".        B-(background): HTN, \"States, \" family history of strokes\". \"I went to bed and woke up at 0130 and walked for an hour. Still up since 01/30. I had the runs. But not now. The top of my head felt like it was going to pop off. Like there was a lot of pressure on my head. I still have it some but not as bad. I think it stress and anxiety\".    A-(assessment): B/P at 0930 172/72 Now it 164/70. Taking Norvasc, Atenolol, hydrochlorothiazide. No missed doses.   Low grade headache. Not unusual for me. Rates 3 out of 10. No chest pain. No difficulty breathing. No unsteady gait. No blurred vision.     R-(recommendations):  \"I don't like the way I feel. I have taken citalopram in the past. Taking nothing now. Maybe I should be on something?  I think I would like to get back on some medication for anxiety. There is a whole lot going on in my life. If Dr. Giles thinks I should be seen I sure will. But maybe first run this by him and see what he thinks\".     Pt requests physician consideration and a callback today please    Mirna Moreau RN on 3/1/2022 at 11:16 AM              Additional Information    Negative: Sounds like a life-threatening emergency to the triager    Negative: Pregnant > 20 weeks or postpartum (< 6 weeks after delivery) and new hand or face swelling    Negative: Pregnant > 20 weeks and BP > 140/90    Negative: Systolic BP >= 160 OR Diastolic >= 100, and any cardiac or neurologic symptoms (e.g., chest pain, difficulty breathing, unsteady gait, blurred vision)    Negative: Patient sounds very sick or weak to the triager    Negative: BP Systolic BP >= 140 OR Diastolic >= 90 and postpartum (from 0 to 6 weeks after delivery)    Negative: Systolic BP >= 180 OR Diastolic >= 110, and missed most recent dose of blood pressure medication    Negative: Systolic BP >= 180 OR Diastolic >= 110    Negative: Patient wants " "to be seen    Negative: Ran out of BP medications    Negative: Taking BP medications and feels is having side effects (e.g., impotence, cough, dizziness)    Answer Assessment - Initial Assessment Questions  1. BLOOD PRESSURE: \"What is the blood pressure?\" \"Did you take at least two measurements 5 minutes apart?\"      172/72. I went to bed and woke up at 0130 and walked for an hour. I had the runs. But not now. The top of my head felt like it was going to pop off. Like there was a lot of pressure on my head. I still have it some but not as bad. I think it stress and anxiety. I don't like the way I feel. I have taken citalopram in the past. Taking nothing now. Maybe I should be on something. If Dr. Giles thinks I should be seen I sure will. But maybe first run this by him and see what he thinks.   2. ONSET: \"When did you take your blood pressure?\"      At 0930 172/72 Now it 164/70  3. HOW: \"How did you obtain the blood pressure?\" (e.g., visiting nurse, automatic home BP monitor)     Automatic   4. HISTORY: \"Do you have a history of high blood pressure?\"      Yes   5. MEDICATIONS: \"Are you taking any medications for blood pressure?\" \"Have you missed any doses recently?\"      Norvasc, Atenolol, hydrochlorothiazide. No missed doses.  6. OTHER SYMPTOMS: \"Do you have any symptoms?\" (e.g., headache, chest pain, blurred vision, difficulty breathing, weakness)      Low grade headache. Not unusual for me. 3 out of 10, I think I would like to get back on some medication for anxiety. There is a whole lot going on in my life. If he wants me to come   7. PREGNANCY: \"Is there any chance you are pregnant?\" \"When was your last menstrual period?\"      na    Protocols used: HIGH BLOOD PRESSURE-A-OH    "

## 2022-03-01 NOTE — PROGRESS NOTES
ASSESSMENT:    ICD-10-CM    1. Anxiety  F41.9 citalopram (CELEXA) 10 MG tablet   2. Hypothyroidism, unspecified type  E03.9 T4 free     TSH     T4 free     TSH   3. Essential hypertension  I10        PLAN:  1.  Start Celexa 10 mg daily.  Follow-up in 4-6 weeks to determine if dose adjustment needed.  Headache improved with no warning symptoms.  This could have been related to stress and anxiety or from sore tight neck, clenching jaw etc.  Continue to monitor and be evaluated with any acute neurologic concerns or worsening.  2.  Repeat TSH and free T4.  TSH was low with normal free T4 in August.  She is having more anxiety over the past 2 months but no change in bowel patterns nor weight.  3.  Blood pressure well controlled.  Continue current.    SUBJECTIVE:    Patient is here today reporting anxiety.  She states that she was up pacing the floor last night.  Her sister has been ill and as needed amputations.  This is been worrisome for her.  She also has a new cat that cries all the time and is into everything.  This is caused increased stress to her life.  She is feeling more anxious over the past 2 months.  She states last night when she was feeling anxious she did get a headache in the back of her head.  She feels as though she was tightening her neck and it was more stiff.  The headache feels better today and is only mild at this time.  She has not had any visual changes nausea or head injuries.  When she checked her blood pressure last night it was when she was feeling very anxious.  She would describe that anxiety as a panic attack.    PROBLEM LIST:  Patient Active Problem List   Diagnosis     Anxiety     Cerebral artery occlusion with cerebral infarction (H)     Hyperlipidemia     Essential hypertension     Hypothyroidism     Insomnia     Transient global amnesia     Vitamin D deficiency     Osteoarthritis of spine with radiculopathy, lumbar region     PAST MEDICAL HISTORY:  Past Medical History:   Diagnosis  Date     Anxiety      Cerebral infarction (H)     ,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     Essential (primary) hypertension     No Comments Provided     Hyperlipidemia     No Comments Provided     Hypothyroidism     No Comments Provided     Insomnia      Migraine with aura and without status migrainosus, not intractable     No Comments Provided     Postmenopausal atrophic vaginitis     No Comments Provided     Sciatica of right side     Intermittent right sciatica     Transient global amnesia     2014     Varicose veins of other specified sites (CODE)     No Comments Provided     SURGICAL HISTORY:  Past Surgical History:   Procedure Laterality Date     BIOPSY BREAST Bilateral      COLONOSCOPY      06,Colonoscopy, next due in .     EXTRACAPSULAR CATARACT EXTRATION WITH INTRAOCULAR LENS IMPLANT Bilateral     No Comments Provided     TONSILLECTOMY, ADENOIDECTOMY, COMBINED      No Comments Provided       SOCIAL HISTORY:  Social History     Socioeconomic History     Marital status:      Spouse name: Not on file     Number of children: Not on file     Years of education: Not on file     Highest education level: Not on file   Occupational History     Not on file   Tobacco Use     Smoking status: Former Smoker     Types: Cigarettes     Quit date: 3/12/1990     Years since quittin.9     Smokeless tobacco: Never Used   Vaping Use     Vaping Use: Never used   Substance and Sexual Activity     Alcohol use: Not Currently     Comment: occ wine     Drug use: No     Sexual activity: Not Currently   Other Topics Concern     Parent/sibling w/ CABG, MI or angioplasty before 65F 55M? Not Asked   Social History Narrative    .    of heart disease.  She is a retired  and travels with her daughter who lives in [place].  Retired from [employer].  Lives in [facility].  Two children.     Social Determinants of Health     Financial Resource Strain: Not on file   Food Insecurity: Not on file    Transportation Needs: Not on file   Physical Activity: Not on file   Stress: Not on file   Social Connections: Not on file   Intimate Partner Violence: Not on file   Housing Stability: Not on file     FAMILYHISTORY:  Family History   Problem Relation Age of Onset     Heart Disease Father         Heart Disease,MI, AAA     Other - See Comments Father         Stroke     Heart Disease Mother      Rheumatoid Arthritis Brother      Hypertension Brother      Aneurysm Brother      Breast Cancer Sister      Hypertension Sister      Hypertension Sister      Hypertension Sister      Diabetes Type 2  Sister      Hypertension Sister      CURRENT MEDICATIONS:   Current Outpatient Medications   Medication Sig Dispense Refill     acetaminophen (TYLENOL) 500 MG tablet Take 1,000 mg by mouth every 6 hours as needed Max acetaminophen dose: 4000 mg in 24 hours       amLODIPine (NORVASC) 5 MG tablet Take 1 tablet (5 mg) by mouth daily 90 tablet 3     aspirin (GOODSENSE ASPIRIN) 325 MG tablet Take 0.5 tablets (162.5 mg) by mouth daily with food       atenolol (TENORMIN) 100 MG tablet Take 1 tablet (100 mg) by mouth daily 90 tablet 3     citalopram (CELEXA) 10 MG tablet Take 1 tablet (10 mg) by mouth daily 90 tablet 3     hydrochlorothiazide (HYDRODIURIL) 25 MG tablet Take 1 tablet (25 mg) by mouth daily 90 tablet 3     levothyroxine (SYNTHROID/LEVOTHROID) 100 MCG tablet Take 1 tablet (100 mcg) by mouth daily 90 tablet 3     simvastatin (ZOCOR) 40 MG tablet Take 1 tablet (40 mg) by mouth At Bedtime 90 tablet 3     Calcium Carbonate-Vit D-Min (CALCIUM 600+D PLUS MINERALS) 600-400 MG-UNIT TABS Take 1 tablet by mouth daily with food (Patient not taking: Reported on 3/1/2022)       latanoprost (XALATAN) 0.005 % ophthalmic solution Place 1 drop into both eyes daily       ALLERGIES:  Trazodone, Codeine, Lisinopril, and Pneumococcal 13-waqas conj vacc    REVIEW OF SYSTEMS:  Review of Systems   Constitutional: Negative for unexpected weight  "change.   Cardiovascular: Negative for palpitations.   Gastrointestinal: Negative for diarrhea and nausea.   Neurological: Positive for headaches. Negative for dizziness, facial asymmetry and light-headedness.   Psychiatric/Behavioral: Positive for sleep disturbance. The patient is nervous/anxious.         She did not sleep last night due to the anxiety.  She does not take Ambien any longer         OBJECTIVE:  /76   Pulse 62   Temp 98  F (36.7  C) (Temporal)   Resp 16   Ht 1.638 m (5' 4.5\")   Wt 55.4 kg (122 lb 3.2 oz)   SpO2 96%   Breastfeeding No   BMI 20.65 kg/m    EXAM:   Pleasant female no acute distress.  Affect normal.  Alert and oriented x4.  LORI increased with a score of 5.  Neck supple without adenopathy.  No thyromegaly.  No thyroid tenderness.  Lung fields clear to auscultation.  Cardiovascular regular.    Lab work from August 2021 reviewed and discussed with patient      Rayna RACHEL Correa NP      Answers for HPI/ROS submitted by the patient on 3/1/2022  LORI 7 TOTAL SCORE: 5  Do you check your blood pressure regularly outside of the clinic?: No  Are your blood pressures ever more than 140 on the top number (systolic) OR more than 90 on the bottom number (diastolic)? (For example, greater than 140/90): Yes  Are you following a low salt diet?: No  Depression/Anxiety: Anxiety  Anxiety since last: : worse  Other associated symotome: : Yes  Significant life event: : No  Anxious:: Yes  Current substance use:: No  How many servings of fruits and vegetables do you eat daily?: 2-3  On average, how many sweetened beverages do you drink each day (Examples: soda, juice, sweet tea, etc.  Do NOT count diet or artificially sweetened beverages)?: 0  How many minutes a day do you exercise enough to make your heart beat faster?: 20 to 29  How many days a week do you exercise enough to make your heart beat faster?: 7  How many days per week do you miss taking your medication?: 0      "

## 2022-03-02 ASSESSMENT — ANXIETY QUESTIONNAIRES: GAD7 TOTAL SCORE: 5

## 2022-05-09 DIAGNOSIS — F41.9 ANXIETY: ICD-10-CM

## 2022-05-09 RX ORDER — ZOLPIDEM TARTRATE 5 MG/1
TABLET ORAL
Qty: 30 TABLET | Refills: 3 | Status: SHIPPED | OUTPATIENT
Start: 2022-05-09 | End: 2022-08-29

## 2022-08-29 ENCOUNTER — OFFICE VISIT (OUTPATIENT)
Dept: INTERNAL MEDICINE | Facility: OTHER | Age: 87
End: 2022-08-29
Attending: INTERNAL MEDICINE
Payer: COMMERCIAL

## 2022-08-29 VITALS
HEIGHT: 66 IN | OXYGEN SATURATION: 98 % | DIASTOLIC BLOOD PRESSURE: 82 MMHG | BODY MASS INDEX: 20.18 KG/M2 | RESPIRATION RATE: 16 BRPM | WEIGHT: 125.6 LBS | TEMPERATURE: 96.1 F | SYSTOLIC BLOOD PRESSURE: 128 MMHG | HEART RATE: 58 BPM

## 2022-08-29 DIAGNOSIS — F41.9 ANXIETY: ICD-10-CM

## 2022-08-29 DIAGNOSIS — Z12.31 ENCOUNTER FOR SCREENING MAMMOGRAM FOR BREAST CANCER: ICD-10-CM

## 2022-08-29 DIAGNOSIS — F51.01 PRIMARY INSOMNIA: ICD-10-CM

## 2022-08-29 DIAGNOSIS — I10 ESSENTIAL HYPERTENSION: Primary | ICD-10-CM

## 2022-08-29 DIAGNOSIS — E78.5 HYPERLIPIDEMIA, UNSPECIFIED HYPERLIPIDEMIA TYPE: ICD-10-CM

## 2022-08-29 DIAGNOSIS — E03.9 HYPOTHYROIDISM, UNSPECIFIED TYPE: ICD-10-CM

## 2022-08-29 LAB
ALBUMIN SERPL-MCNC: 4.3 G/DL (ref 3.5–5.7)
ALP SERPL-CCNC: 24 U/L (ref 34–104)
ALT SERPL W P-5'-P-CCNC: 24 U/L (ref 7–52)
ANION GAP SERPL CALCULATED.3IONS-SCNC: 5 MMOL/L (ref 3–14)
AST SERPL W P-5'-P-CCNC: 25 U/L (ref 13–39)
BILIRUB SERPL-MCNC: 0.8 MG/DL (ref 0.3–1)
BUN SERPL-MCNC: 20 MG/DL (ref 7–25)
CALCIUM SERPL-MCNC: 9.9 MG/DL (ref 8.6–10.3)
CHLORIDE BLD-SCNC: 98 MMOL/L (ref 98–107)
CHOLEST SERPL-MCNC: 118 MG/DL
CO2 SERPL-SCNC: 32 MMOL/L (ref 21–31)
CREAT SERPL-MCNC: 0.79 MG/DL (ref 0.6–1.2)
FASTING STATUS PATIENT QL REPORTED: NORMAL
GFR SERPL CREATININE-BSD FRML MDRD: 73 ML/MIN/1.73M2
GLUCOSE BLD-MCNC: 100 MG/DL (ref 70–105)
HDLC SERPL-MCNC: 42 MG/DL (ref 23–92)
LDLC SERPL CALC-MCNC: 50 MG/DL
NONHDLC SERPL-MCNC: 76 MG/DL
POTASSIUM BLD-SCNC: 3.8 MMOL/L (ref 3.5–5.1)
PROT SERPL-MCNC: 6.7 G/DL (ref 6.4–8.9)
SODIUM SERPL-SCNC: 135 MMOL/L (ref 134–144)
T4 FREE SERPL-MCNC: 1.09 NG/DL (ref 0.6–1.6)
TRIGL SERPL-MCNC: 130 MG/DL
TSH SERPL DL<=0.005 MIU/L-ACNC: 0.09 MU/L (ref 0.4–4)

## 2022-08-29 PROCEDURE — 84439 ASSAY OF FREE THYROXINE: CPT | Mod: ZL | Performed by: INTERNAL MEDICINE

## 2022-08-29 PROCEDURE — 82040 ASSAY OF SERUM ALBUMIN: CPT | Mod: ZL | Performed by: INTERNAL MEDICINE

## 2022-08-29 PROCEDURE — G0463 HOSPITAL OUTPT CLINIC VISIT: HCPCS

## 2022-08-29 PROCEDURE — 80053 COMPREHEN METABOLIC PANEL: CPT | Mod: ZL | Performed by: INTERNAL MEDICINE

## 2022-08-29 PROCEDURE — G0439 PPPS, SUBSEQ VISIT: HCPCS | Performed by: INTERNAL MEDICINE

## 2022-08-29 PROCEDURE — 36415 COLL VENOUS BLD VENIPUNCTURE: CPT | Mod: ZL | Performed by: INTERNAL MEDICINE

## 2022-08-29 PROCEDURE — 84443 ASSAY THYROID STIM HORMONE: CPT | Mod: ZL | Performed by: INTERNAL MEDICINE

## 2022-08-29 PROCEDURE — 80061 LIPID PANEL: CPT | Mod: ZL | Performed by: INTERNAL MEDICINE

## 2022-08-29 RX ORDER — ZOLPIDEM TARTRATE 5 MG/1
TABLET ORAL
Qty: 30 TABLET | Refills: 3 | Status: SHIPPED | OUTPATIENT
Start: 2022-08-29 | End: 2022-11-03

## 2022-08-29 RX ORDER — HYDROCHLOROTHIAZIDE 25 MG/1
25 TABLET ORAL DAILY
Qty: 90 TABLET | Refills: 3 | Status: SHIPPED | OUTPATIENT
Start: 2022-08-29 | End: 2023-08-31

## 2022-08-29 RX ORDER — AMLODIPINE BESYLATE 5 MG/1
5 TABLET ORAL DAILY
Qty: 90 TABLET | Refills: 3 | Status: SHIPPED | OUTPATIENT
Start: 2022-08-29 | End: 2023-08-31

## 2022-08-29 RX ORDER — SIMVASTATIN 40 MG
40 TABLET ORAL AT BEDTIME
Qty: 90 TABLET | Refills: 3 | Status: SHIPPED | OUTPATIENT
Start: 2022-08-29 | End: 2022-08-29

## 2022-08-29 RX ORDER — ATORVASTATIN CALCIUM 20 MG/1
20 TABLET, FILM COATED ORAL DAILY
Qty: 90 TABLET | Refills: 3 | Status: SHIPPED | OUTPATIENT
Start: 2022-08-29 | End: 2023-08-31

## 2022-08-29 RX ORDER — LEVOTHYROXINE SODIUM 100 UG/1
100 TABLET ORAL DAILY
Qty: 90 TABLET | Refills: 3 | Status: SHIPPED | OUTPATIENT
Start: 2022-08-29 | End: 2022-10-13

## 2022-08-29 RX ORDER — ATENOLOL 100 MG/1
100 TABLET ORAL DAILY
Qty: 90 TABLET | Refills: 3 | Status: SHIPPED | OUTPATIENT
Start: 2022-08-29 | End: 2023-08-31

## 2022-08-29 ASSESSMENT — PAIN SCALES - GENERAL: PAINLEVEL: NO PAIN (0)

## 2022-08-29 NOTE — NURSING NOTE
"Chief Complaint   Patient presents with     Medicare Visit     Physical       Initial /82   Pulse 58   Temp (!) 96.1  F (35.6  C) (Temporal)   Resp 16   Ht 1.67 m (5' 5.75\")   Wt 57 kg (125 lb 9.6 oz)   SpO2 98%   Breastfeeding No   BMI 20.43 kg/m   Estimated body mass index is 20.43 kg/m  as calculated from the following:    Height as of this encounter: 1.67 m (5' 5.75\").    Weight as of this encounter: 57 kg (125 lb 9.6 oz).  FOOD SECURITY SCREENING QUESTIONS  Hunger Vital Signs:  Within the past 12 months we worried whether our food would run out before we got money to buy more. Never  Within the past 12 months the food we bought just didn't last and we didn't have money to get more. Never        Mike Bedoya, LPN    "

## 2022-08-29 NOTE — PATIENT INSTRUCTIONS
Continue your current medications except for the cholesterol medication which was changed to a atorvastatin 20 mg daily.    They will call you for a mammogram.    Blood tests today, I will send you a letter with the results.    If all goes well, follow-up annually.

## 2022-08-29 NOTE — LETTER
August 29, 2022      Bea Flores  [address]  [place] [address]        Dear Bea,    Below are the results of your recent labs:    Results for orders placed or performed in visit on 08/29/22   T4, Free     Status: Normal   Result Value Ref Range    Free T4 1.09 0.60 - 1.60 ng/dL   TSH     Status: Abnormal   Result Value Ref Range    TSH 0.09 (L) 0.40 - 4.00 mU/L   Lipid Panel     Status: None   Result Value Ref Range    Cholesterol 118 <200 mg/dL    Triglycerides 130 <150 mg/dL    Direct Measure HDL 42 23 - 92 mg/dL    LDL Cholesterol Calculated 50 <=100 mg/dL    Non HDL Cholesterol 76 <130 mg/dL    Patient Fasting > 8hrs? Unknown     Narrative    Cholesterol  Desirable:  <200 mg/dL    Triglycerides  Normal:  Less than 150 mg/dL  Borderline High:  150-199 mg/dL  High:  200-499 mg/dL  Very High:  Greater than or equal to 500 mg/dL    Direct Measure HDL  Female:  Greater than or equal to 50 mg/dL   Male:  Greater than or equal to 40 mg/dL    LDL Cholesterol  Desirable:  <100mg/dL  Above Desirable:  100-129 mg/dL   Borderline High:  130-159 mg/dL   High:  160-189 mg/dL   Very High:  >= 190 mg/dL    Non HDL Cholesterol  Desirable:  130 mg/dL  Above Desirable:  130-159 mg/dL  Borderline High:  160-189 mg/dL  High:  190-219 mg/dL  Very High:  Greater than or equal to 220 mg/dL   Comprehensive metabolic panel     Status: Abnormal   Result Value Ref Range    Sodium 135 134 - 144 mmol/L    Potassium 3.8 3.5 - 5.1 mmol/L    Chloride 98 98 - 107 mmol/L    Carbon Dioxide (CO2) 32 (H) 21 - 31 mmol/L    Anion Gap 5 3 - 14 mmol/L    Urea Nitrogen 20 7 - 25 mg/dL    Creatinine 0.79 0.60 - 1.20 mg/dL    Calcium 9.9 8.6 - 10.3 mg/dL    Glucose 100 70 - 105 mg/dL    Alkaline Phosphatase 24 (L) 34 - 104 U/L    AST 25 13 - 39 U/L    ALT 24 7 - 52 U/L    Protein Total 6.7 6.4 - 8.9 g/dL    Albumin 4.3 3.5 - 5.7 g/dL    Bilirubin Total 0.8 0.3 - 1.0 mg/dL    GFR Estimate 73 >60 mL/min/1.73m2        Your TSH which is a  thyroid test is a little bit suppressed but the free T4 level is normal.  I think we will keep your thyroid dose the same for now.  All of the rest your blood tests look fine.  If you have any questions about your results, feel free to contact me.    Sincerely,        Evan Giles MD  Internal Medicine  [facility]

## 2022-08-29 NOTE — PROGRESS NOTES
SUBJECTIVE:   Bea Flores is a 86 year old female who presents for Preventive Visit.      Patient has been advised of split billing requirements and indicates understanding: Yes  Are you in the first 12 months of your Medicare coverage?  No    HPI     She is here today for Medicare wellness visit.  She feels fine.  She has treated hypertension and treated hyperlipidemia.  She is on simvastatin with amlodipine so I suggested that we change to a different lipid-lowering therapy.  She has treated hypothyroidism and is due for recheck on that.  Her biggest problem is with anxiety and insomnia.  She remains on Celexa and Ambien for sleep as needed.  She finds the Ambien very beneficial and she needs to continue with that.    No other complaints or concerns.  She would like to get a mammogram.  Everything else is up-to-date.  Medications are reconciled.  Past medical history, past surgical history, family history and social histories are reviewed and updated.  Immunizations are up-to-date.      Do you feel safe in your environment? Yes    Have you ever done Advance Care Planning? (For example, a Health Directive, POLST, or a discussion with a medical provider or your loved ones about your wishes): No, advance care planning information given to patient to review.  Patient declined advance care planning discussion at this time.       Fall risk  Fallen 2 or more times in the past year?: No  Any fall with injury in the past year?: No     Cognitive Screening   1) Repeat 3 items (Leader, Season, Table)    2) Clock draw: NORMAL  3) 3 item recall: Recalls 3 objects  Results: 3 items recalled: COGNITIVE IMPAIRMENT LESS LIKELY    Mini-CogTM Copyright [employer]. Licensed by the author for use in [facility]; reprinted with permission (audelia@.[facility]). All rights reserved.      Do you have sleep apnea, excessive snoring or daytime drowsiness?: no    Reviewed and updated as needed this visit by clinical staff   Tobacco   "Allergies  Meds  Problems  Med Hx  Surg Hx  Fam Hx            Reviewed and updated as needed this visit by Provider   Tobacco  Allergies  Meds  Problems  Med Hx  Surg Hx  Fam Hx           Social History     Tobacco Use     Smoking status: Former Smoker     Types: Cigarettes     Quit date: 3/12/1990     Years since quittin.4     Smokeless tobacco: Never Used   Substance Use Topics     Alcohol use: Yes     Comment: occ wine     If you drink alcohol do you typically have >3 drinks per day or >7 drinks per week? No    Alcohol Use 2022   Prescreen: >3 drinks/day or >7 drinks/week? No           Current Outpatient Medications   Medication     acetaminophen (TYLENOL) 500 MG tablet     amLODIPine (NORVASC) 5 MG tablet     aspirin (GOODSENSE ASPIRIN) 325 MG tablet     atenolol (TENORMIN) 100 MG tablet     atorvastatin (LIPITOR) 20 MG tablet     Calcium Carbonate-Vit D-Min (CALCIUM 600+D PLUS MINERALS) 600-400 MG-UNIT TABS     citalopram (CELEXA) 10 MG tablet     hydrochlorothiazide (HYDRODIURIL) 25 MG tablet     levothyroxine (SYNTHROID/LEVOTHROID) 100 MCG tablet     zolpidem (AMBIEN) 5 MG tablet     No current facility-administered medications for this visit.         Current providers sharing in care for this patient include:   Patient Care Team:  Evan Giles MD as PCP - General (Internal Medicine)  Evan Giles MD as Assigned PCP    The following health maintenance items are reviewed in Epic and correct as of today:  Health Maintenance Due   Topic Date Due     INFLUENZA VACCINE (1) 2022     Lab work is in process        Pertinent mammograms are reviewed under the imaging tab.    Review of Systems  Constitutional, HEENT, cardiovascular, pulmonary, GI, , musculoskeletal, neuro, skin, endocrine and psych systems are negative, except as otherwise noted.    OBJECTIVE:   /82   Pulse 58   Temp (!) 96.1  F (35.6  C) (Temporal)   Resp 16   Ht 1.67 m (5' 5.75\")   Wt 57 kg (125 lb 9.6 " "oz)   SpO2 98%   Breastfeeding No   BMI 20.43 kg/m   Estimated body mass index is 20.43 kg/m  as calculated from the following:    Height as of this encounter: 1.67 m (5' 5.75\").    Weight as of this encounter: 57 kg (125 lb 9.6 oz).  Physical Exam  GENERAL APPEARANCE: healthy, alert and no distress  EYES: Eyes grossly normal to inspection, PERRL and conjunctivae and sclerae normal  HENT: ear canals and TM's normal, nose and mouth without ulcers or lesions, oropharynx clear and oral mucous membranes moist  NECK: no adenopathy, no asymmetry, masses, or scars and thyroid normal to palpation  RESP: lungs clear to auscultation - no rales, rhonchi or wheezes  BREAST: normal without masses, tenderness or nipple discharge and no palpable axillary masses or adenopathy  CV: regular rate and rhythm, normal S1 S2, no S3 or S4, no murmur, click or rub, no peripheral edema and peripheral pulses strong  ABDOMEN: soft, nontender, no hepatosplenomegaly, no masses and bowel sounds normal  MS: no musculoskeletal defects are noted and gait is age appropriate without ataxia  SKIN: no suspicious lesions or rashes  NEURO: Normal strength and tone, sensory exam grossly normal, mentation intact and speech normal  PSYCH: mentation appears normal and affect normal/bright        ASSESSMENT / PLAN:       ICD-10-CM    1. Essential hypertension  I10 amLODIPine (NORVASC) 5 MG tablet     atenolol (TENORMIN) 100 MG tablet     hydrochlorothiazide (HYDRODIURIL) 25 MG tablet     Comprehensive metabolic panel   2. Hypothyroidism, unspecified type  E03.9 levothyroxine (SYNTHROID/LEVOTHROID) 100 MCG tablet     TSH     T4, Free   3. Hyperlipidemia, unspecified hyperlipidemia type  E78.5 Lipid Panel     atorvastatin (LIPITOR) 20 MG tablet     DISCONTINUED: simvastatin (ZOCOR) 40 MG tablet   4. Anxiety  F41.9 zolpidem (AMBIEN) 5 MG tablet   5. Primary insomnia  F51.01    6. Encounter for screening mammogram for breast cancer  Z12.31 MA Screening Digital " "Bilateral       Patient has been advised of split billing requirements and indicates understanding: Yes    COUNSELING:    She is doing well and her exam today is normal.  She appears to be healthy for her age.  Medications are refilled although I did change her simvastatin to atorvastatin for safety reasons.  Lab is pending, I will send her a letter with the results.  Mammogram scheduled.  If all goes well, follow-up annually.    Reviewed preventive health counseling, as reflected in patient instructions       Regular exercise       Healthy diet/nutrition    Estimated body mass index is 20.43 kg/m  as calculated from the following:    Height as of this encounter: 1.67 m (5' 5.75\").    Weight as of this encounter: 57 kg (125 lb 9.6 oz).        She reports that she quit smoking about 32 years ago. Her smoking use included cigarettes. She has never used smokeless tobacco.      Appropriate preventive services were discussed with this patient, including applicable screening as appropriate for cardiovascular disease, diabetes, osteopenia/osteoporosis, and glaucoma.  As appropriate for age/gender, discussed screening for colorectal cancer, prostate cancer, breast cancer, and cervical cancer. Checklist reviewing preventive services available has been given to the patient.    Reviewed patients plan of care and provided an AVS. The Basic Care Plan (routine screening as documented in Health Maintenance) for Bea meets the Care Plan requirement. This Care Plan has been established and reviewed with the Patient.    Counseling Resources:  ATP IV Guidelines  Pooled Cohorts Equation Calculator  Breast Cancer Risk Calculator  Breast Cancer: Medication to Reduce Risk  FRAX Risk Assessment  ICSI Preventive Guidelines  Dietary Guidelines for Americans, 2010  USDA's MyPlate  ASA Prophylaxis  Lung CA Screening    AJITH HOWE MD  [facility]    Identified Health Risks:  "

## 2022-08-30 ENCOUNTER — APPOINTMENT (OUTPATIENT)
Dept: OTOLARYNGOLOGY | Facility: OTHER | Age: 87
End: 2022-08-30
Attending: INTERNAL MEDICINE
Payer: MEDICARE

## 2022-09-02 ENCOUNTER — HOSPITAL ENCOUNTER (OUTPATIENT)
Dept: MAMMOGRAPHY | Facility: OTHER | Age: 87
Discharge: HOME | End: 2022-09-02
Attending: INTERNAL MEDICINE | Admitting: INTERNAL MEDICINE
Payer: MEDICARE

## 2022-09-02 DIAGNOSIS — Z12.31 ENCOUNTER FOR SCREENING MAMMOGRAM FOR BREAST CANCER: ICD-10-CM

## 2022-09-02 PROCEDURE — 77067 SCR MAMMO BI INCL CAD: CPT

## 2022-10-13 DIAGNOSIS — E03.9 HYPOTHYROIDISM, UNSPECIFIED TYPE: ICD-10-CM

## 2022-10-13 RX ORDER — LEVOTHYROXINE SODIUM 100 UG/1
TABLET ORAL
Qty: 90 TABLET | Refills: 0 | Status: SHIPPED | OUTPATIENT
Start: 2022-10-13 | End: 2023-08-31

## 2022-10-18 DIAGNOSIS — I10 ESSENTIAL HYPERTENSION: ICD-10-CM

## 2022-10-18 RX ORDER — AMLODIPINE BESYLATE 5 MG/1
TABLET ORAL
Qty: 90 TABLET | Refills: 0 | OUTPATIENT
Start: 2022-10-18

## 2022-10-18 NOTE — TELEPHONE ENCOUNTER
[facility] sent Rx request for the following:    amLODIPine Besylate 5 MG Oral Tablet  Last Prescription Date:   8/29/22  Last Fill Qty/Refills:         90, R-3    Last Office Visit:              8/29/22   Future Office visit:           None   Redundant refill request refused: Too soon:    Cora Gasca RN on 10/18/2022 at 3:29 PM

## 2022-10-25 ENCOUNTER — APPOINTMENT (OUTPATIENT)
Dept: OTOLARYNGOLOGY | Facility: OTHER | Age: 87
End: 2022-10-25
Attending: OTOLARYNGOLOGY
Payer: MEDICARE

## 2022-11-03 DIAGNOSIS — F41.9 ANXIETY: ICD-10-CM

## 2022-11-03 RX ORDER — ZOLPIDEM TARTRATE 5 MG/1
TABLET ORAL
Qty: 30 TABLET | Refills: 2 | Status: SHIPPED | OUTPATIENT
Start: 2022-11-03 | End: 2023-04-20

## 2022-11-03 NOTE — TELEPHONE ENCOUNTER
[id number] [place] sent Rx request for the following:      Requested Prescriptions   Pending Prescriptions Disp Refills     zolpidem (AMBIEN) 5 MG tablet [Pharmacy Med Name: Zolpidem Tartrate 5 MG Oral Tablet] 30 tablet 0     Sig: TAKE 1 TABLET BY MOUTH NIGHTLY AS NEEDED   Last Prescription Date:   8/29/22  Last Fill Qty/Refills:         30, R-3    Last Office Visit:              8/29/22   Future Office visit:           None    Jenni Yanes RN .............. 11/3/2022  11:01 AM

## 2023-02-01 ENCOUNTER — TELEPHONE (OUTPATIENT)
Dept: INTERNAL MEDICINE | Facility: OTHER | Age: 88
End: 2023-02-01
Payer: COMMERCIAL

## 2023-02-01 NOTE — TELEPHONE ENCOUNTER
Patient states she is going to talk to JABARI Camejo NP and get her advice/recommendation first. She will call if she needs a referral. Phone number provided for [facility]. Patient will call to see how quickly she may get in, and will cancel appt here if she can see someone there before the 23rd. Heena Schwab RN on 2/1/2023 at 4:29 PM

## 2023-02-01 NOTE — TELEPHONE ENCOUNTER
Left message for patient to return call. [facility] is no longer able to provide foot and nail care services. An outgoing contact was made to offer other options and referral placement.  Heena Schwab RN on 2/1/2023 at 1:29 PM

## 2023-02-07 ENCOUNTER — TELEPHONE (OUTPATIENT)
Dept: INTERNAL MEDICINE | Facility: OTHER | Age: 88
End: 2023-02-07
Payer: COMMERCIAL

## 2023-02-07 NOTE — TELEPHONE ENCOUNTER
See telephone encounter from 2/1 regarding foot and nail care. Closing encounter.    KAREN DELUCA RN on 2/7/2023 at 11:22 AM

## 2023-02-07 NOTE — TELEPHONE ENCOUNTER
Pt returned your call and cancelled appt with Bashir.  Please call if needed.    Myles Paul on 2/7/2023 at 11:05 AM

## 2023-03-13 ENCOUNTER — TRANSFERRED RECORDS (OUTPATIENT)
Dept: HEALTH INFORMATION MANAGEMENT | Facility: OTHER | Age: 88
End: 2023-03-13
Payer: COMMERCIAL

## 2023-03-20 DIAGNOSIS — F41.9 ANXIETY: ICD-10-CM

## 2023-03-22 RX ORDER — CITALOPRAM HYDROBROMIDE 10 MG/1
TABLET ORAL
Qty: 90 TABLET | Refills: 0 | Status: SHIPPED | OUTPATIENT
Start: 2023-03-22 | End: 2023-06-22

## 2023-03-22 NOTE — TELEPHONE ENCOUNTER
[facility] sent Rx request for the following:      Requested Prescriptions   Pending Prescriptions Disp Refills     citalopram (CELEXA) 10 MG tablet [Pharmacy Med Name: Citalopram Hydrobromide 10 MG Oral Tablet] 90 tablet 0     Sig: Take 1 tablet by mouth once daily          Last Prescription Date:   3/1/2022  Last Fill Qty/Refills:         90, R-3    Last Office Visit:              8/29/2022   Future Office visit:           None    Yamilet Arriola RN on 3/22/2023 at 3:13 PM

## 2023-04-20 DIAGNOSIS — F41.9 ANXIETY: ICD-10-CM

## 2023-04-20 RX ORDER — ZOLPIDEM TARTRATE 5 MG/1
TABLET ORAL
Qty: 30 TABLET | Refills: 0 | Status: SHIPPED | OUTPATIENT
Start: 2023-04-20 | End: 2023-05-12

## 2023-05-12 DIAGNOSIS — F41.9 ANXIETY: ICD-10-CM

## 2023-05-12 RX ORDER — ZOLPIDEM TARTRATE 5 MG/1
5 TABLET ORAL
Qty: 30 TABLET | Refills: 0 | Status: SHIPPED | OUTPATIENT
Start: 2023-05-12 | End: 2023-07-14

## 2023-05-12 NOTE — TELEPHONE ENCOUNTER
In clinical absence of patient's primary, Evan Giles, patient is requesting that this message be sent to the covering provider for consideration please.    Jenni Yanes RN .............. 5/12/2023  10:17 AM

## 2023-05-12 NOTE — TELEPHONE ENCOUNTER
Notes from pharmacy:  Patient has appointment on 6-8-23. She is due for another fill around May 17. The last generic she received is not working for her, and she would like to fill an Rx for the generic she had previously. She doesn't have any refills left, and is hoping you could send in a new Rx. Thank you!    Jenni Yanes RN .............. 5/12/2023  9:37 AM

## 2023-06-22 DIAGNOSIS — F41.9 ANXIETY: ICD-10-CM

## 2023-06-22 RX ORDER — CITALOPRAM HYDROBROMIDE 10 MG/1
10 TABLET ORAL DAILY
Qty: 90 TABLET | Refills: 0 | Status: SHIPPED | OUTPATIENT
Start: 2023-06-22 | End: 2023-07-14

## 2023-06-22 NOTE — TELEPHONE ENCOUNTER
Reason for call: Medication or medication refill    Name of medication requested: citalopram (CELEXA) 10 MG tablet    Are you out of the medication? yes    What pharmacy do you use? [facility] [place]     Preferred method for responding to this message: Telephone Call    Phone number patient can be reached at: Home number on file [phone] (home)    If we cannot reach you directly, may we leave a detailed response at the number you provided? Yes     Patricia Guerrero on 6/22/2023 at 1:37 PM

## 2023-06-22 NOTE — TELEPHONE ENCOUNTER
Requested Prescriptions   Pending Prescriptions Disp Refills     citalopram (CELEXA) 10 MG tablet 90 tablet 0     Sig: Take 1 tablet (10 mg) by mouth daily   Last Prescription Date:   3/22/23  Last Fill Qty/Refills:         90, R-0    Last Office Visit:              8/29/22 (Dr. Giles)  Future Office visit:             Next 5 appointments (look out 90 days)    Jul 12, 2023  8:00 AM  SHORT with Rayna Correa NP  [facility] ([facility] [address]  [place] [address]  [phone]   Aug 31, 2023 11:00 AM  Office Visit with Matt Vásquez MD  [facility] ([facility] [address]  [place] [address]  [phone]        Routing to covering provider in the absence of Dr. Giles, Lorena Correa and Dr. Vásquez.    Jenni Yanes RN .............. 6/22/2023  1:40 PM

## 2023-07-12 NOTE — PROGRESS NOTES
Assessment & Plan     1. Anxiety  Chronic, self titrated her dose to 20 mg and noticed improvement. Refilled at higher dose.   - zolpidem (AMBIEN) 5 MG tablet; Take 1 tablet (5 mg) by mouth nightly as needed for sleep  Dispense: 30 tablet; Refill: 3  - citalopram (CELEXA) 20 MG tablet; Take 1 tablet (20 mg) by mouth daily  Dispense: 90 tablet; Refill: 3    2. Primary insomnia  Chronic, stable. Refilled as below. PDMP reviewed and appears appropriate.   - zolpidem (AMBIEN) 5 MG tablet; Take 1 tablet (5 mg) by mouth nightly as needed for sleep  Dispense: 30 tablet; Refill: 3    No follow-ups on file.    Alicia Mullins PA-C  [facility] is a 87 year old, presenting for the following health issues:  Refill Request      History of Present Illness       Reason for visit:  Prescription    She eats 2-3 servings of fruits and vegetables daily.She exercises with enough effort to increase her heart rate 30 to 60 minutes per day.  She exercises with enough effort to increase her heart rate 5 days per week.   She is taking medications regularly.     For follow-up on anxiety and insomnia.  Patient continues on Celexa chronically for management of anxiety.  She had been prescribed 10 mg daily but reports she self increased her dose to 20 mg and is noticing improvement.  She would like to continue on this dose.  She is also needing refills of Ambien for insomnia.  She has been on this medication longer-term and feels she does well.  PDMP reviewed and appears appropriate.    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     Essential (primary) hypertension     No Comments Provided     Hyperlipidemia     No Comments Provided     Hypothyroidism     No Comments Provided     Insomnia      Migraine with aura and without status migrainosus, not intractable     No Comments Provided      Postmenopausal atrophic vaginitis     No Comments Provided     Sciatica of right side     Intermittent right sciatica     Transient global amnesia     2014     Varicose veins of other specified sites (CODE)     No Comments Provided       PAST SURGICAL HISTORY:   Past Surgical History:   Procedure Laterality Date     BIOPSY BREAST Bilateral      COLONOSCOPY      06,Colonoscopy, next due in 2016.     EXTRACAPSULAR CATARACT EXTRATION WITH INTRAOCULAR LENS IMPLANT Bilateral     No Comments Provided     TONSILLECTOMY, ADENOIDECTOMY, COMBINED      No Comments Provided       FAMILY HISTORY:   Family History   Problem Relation Age of Onset     Heart Disease Father         Heart Disease,MI, AAA     Other - See Comments Father         Stroke     Heart Disease Mother      Rheumatoid Arthritis Brother      Hypertension Brother      Aneurysm Brother      Breast Cancer Sister      Hypertension Sister      Hypertension Sister      Hypertension Sister      Diabetes Type 2  Sister      Hypertension Sister        SOCIAL HISTORY:   Social History     Tobacco Use     Smoking status: Former     Types: Cigarettes     Quit date: 3/12/1990     Years since quittin.3     Smokeless tobacco: Never   Substance Use Topics     Alcohol use: Yes     Comment: occ wine        Allergies   Allergen Reactions     Trazodone Shortness Of Breath and Unknown     Codeine Other (See Comments)     hallucinations     Lisinopril Cough     Pneumococcal 13-Bertha Conj Vacc Other (See Comments)     Arm swelling       Current Outpatient Medications   Medication     acetaminophen (TYLENOL) 500 MG tablet     amLODIPine (NORVASC) 5 MG tablet     aspirin (GOODSENSE ASPIRIN) 325 MG tablet     atenolol (TENORMIN) 100 MG tablet     atorvastatin (LIPITOR) 20 MG tablet     Calcium Carbonate-Vit D-Min (CALCIUM 600+D PLUS MINERALS) 600-400 MG-UNIT TABS     citalopram (CELEXA) 20 MG tablet     hydrochlorothiazide (HYDRODIURIL) 25 MG tablet     levothyroxine  "(SYNTHROID/LEVOTHROID) 100 MCG tablet     zolpidem (AMBIEN) 5 MG tablet     No current facility-administered medications for this visit.         Review of Systems   Per HPI        Objective    /76   Pulse 66   Temp 97.4  F (36.3  C)   Resp 14   Ht 1.67 m (5' 5.75\")   Wt 57.1 kg (125 lb 12.8 oz)   SpO2 96%   BMI 20.46 kg/m    Body mass index is 20.46 kg/m .  Physical Exam   General: Pleasant, in no apparent distress.  Cardiovascular: Regular rate and rhythm with S1 equal to S2. No murmurs, friction rubs, or gallops.   Respiratory: Lungs are resonant and clear to auscultation bilaterally. No wheezes, crackles, or rhonchi.  Psych: Appropriate mood and affect.          "

## 2023-07-14 ENCOUNTER — OFFICE VISIT (OUTPATIENT)
Dept: FAMILY MEDICINE | Facility: OTHER | Age: 88
End: 2023-07-14
Attending: PHYSICIAN ASSISTANT
Payer: COMMERCIAL

## 2023-07-14 VITALS
TEMPERATURE: 97.4 F | OXYGEN SATURATION: 96 % | HEART RATE: 66 BPM | HEIGHT: 66 IN | DIASTOLIC BLOOD PRESSURE: 76 MMHG | WEIGHT: 125.8 LBS | RESPIRATION RATE: 14 BRPM | BODY MASS INDEX: 20.22 KG/M2 | SYSTOLIC BLOOD PRESSURE: 134 MMHG

## 2023-07-14 DIAGNOSIS — F51.01 PRIMARY INSOMNIA: ICD-10-CM

## 2023-07-14 DIAGNOSIS — F41.9 ANXIETY: Primary | ICD-10-CM

## 2023-07-14 PROCEDURE — 99214 OFFICE O/P EST MOD 30 MIN: CPT | Performed by: PHYSICIAN ASSISTANT

## 2023-07-14 PROCEDURE — G0463 HOSPITAL OUTPT CLINIC VISIT: HCPCS

## 2023-07-14 RX ORDER — ZOLPIDEM TARTRATE 5 MG/1
5 TABLET ORAL
Qty: 30 TABLET | Refills: 3 | Status: SHIPPED | OUTPATIENT
Start: 2023-07-14 | End: 2023-08-31

## 2023-07-14 RX ORDER — CITALOPRAM HYDROBROMIDE 20 MG/1
20 TABLET ORAL DAILY
Qty: 90 TABLET | Refills: 3 | Status: SHIPPED | OUTPATIENT
Start: 2023-07-14 | End: 2023-08-31

## 2023-07-14 ASSESSMENT — PAIN SCALES - GENERAL: PAINLEVEL: NO PAIN (0)

## 2023-07-14 NOTE — NURSING NOTE
Patient presents to clinic for medication refill.  Thelma Trevizo LPN ....................  7/14/2023   1:43 PM  EXT [id number]

## 2023-08-31 ENCOUNTER — OFFICE VISIT (OUTPATIENT)
Dept: INTERNAL MEDICINE | Facility: OTHER | Age: 88
End: 2023-08-31
Attending: STUDENT IN AN ORGANIZED HEALTH CARE EDUCATION/TRAINING PROGRAM
Payer: COMMERCIAL

## 2023-08-31 VITALS
BODY MASS INDEX: 21.34 KG/M2 | HEART RATE: 57 BPM | TEMPERATURE: 98.9 F | SYSTOLIC BLOOD PRESSURE: 154 MMHG | OXYGEN SATURATION: 98 % | WEIGHT: 125 LBS | DIASTOLIC BLOOD PRESSURE: 82 MMHG | HEIGHT: 64 IN | RESPIRATION RATE: 18 BRPM

## 2023-08-31 DIAGNOSIS — G25.81 RESTLESS LEG SYNDROME: ICD-10-CM

## 2023-08-31 DIAGNOSIS — F51.01 PRIMARY INSOMNIA: ICD-10-CM

## 2023-08-31 DIAGNOSIS — I10 ESSENTIAL HYPERTENSION: ICD-10-CM

## 2023-08-31 DIAGNOSIS — F41.9 ANXIETY: ICD-10-CM

## 2023-08-31 DIAGNOSIS — E03.9 HYPOTHYROIDISM, UNSPECIFIED TYPE: ICD-10-CM

## 2023-08-31 DIAGNOSIS — Z00.00 MEDICARE ANNUAL WELLNESS VISIT, SUBSEQUENT: Primary | ICD-10-CM

## 2023-08-31 DIAGNOSIS — E78.5 HYPERLIPIDEMIA, UNSPECIFIED HYPERLIPIDEMIA TYPE: ICD-10-CM

## 2023-08-31 LAB
ALBUMIN SERPL BCG-MCNC: 4.6 G/DL (ref 3.5–5.2)
ALP SERPL-CCNC: 23 U/L (ref 35–104)
ALT SERPL W P-5'-P-CCNC: 32 U/L (ref 0–50)
ANION GAP SERPL CALCULATED.3IONS-SCNC: 7 MMOL/L (ref 7–15)
AST SERPL W P-5'-P-CCNC: 29 U/L (ref 0–45)
BILIRUB SERPL-MCNC: 1.2 MG/DL
BUN SERPL-MCNC: 14.7 MG/DL (ref 8–23)
CALCIUM SERPL-MCNC: 10.3 MG/DL (ref 8.8–10.2)
CHLORIDE SERPL-SCNC: 96 MMOL/L (ref 98–107)
CHOLEST SERPL-MCNC: 129 MG/DL
CREAT SERPL-MCNC: 0.84 MG/DL (ref 0.51–0.95)
DEPRECATED HCO3 PLAS-SCNC: 31 MMOL/L (ref 22–29)
ERYTHROCYTE [DISTWIDTH] IN BLOOD BY AUTOMATED COUNT: 13.3 % (ref 10–15)
GFR SERPL CREATININE-BSD FRML MDRD: 67 ML/MIN/1.73M2
GLUCOSE SERPL-MCNC: 98 MG/DL (ref 70–99)
HCT VFR BLD AUTO: 43.7 % (ref 35–47)
HDLC SERPL-MCNC: 50 MG/DL
HGB BLD-MCNC: 14.9 G/DL (ref 11.7–15.7)
IRON BINDING CAPACITY (ROCHE): 287 UG/DL (ref 240–430)
IRON SATN MFR SERPL: 33 % (ref 15–46)
IRON SERPL-MCNC: 95 UG/DL (ref 37–145)
LDLC SERPL CALC-MCNC: 63 MG/DL
MAGNESIUM SERPL-MCNC: 2.3 MG/DL (ref 1.7–2.3)
MCH RBC QN AUTO: 32.6 PG (ref 26.5–33)
MCHC RBC AUTO-ENTMCNC: 34.1 G/DL (ref 31.5–36.5)
MCV RBC AUTO: 96 FL (ref 78–100)
NONHDLC SERPL-MCNC: 79 MG/DL
PLATELET # BLD AUTO: 172 10E3/UL (ref 150–450)
POTASSIUM SERPL-SCNC: 4.6 MMOL/L (ref 3.4–5.3)
PROT SERPL-MCNC: 7.3 G/DL (ref 6.4–8.3)
RBC # BLD AUTO: 4.57 10E6/UL (ref 3.8–5.2)
SODIUM SERPL-SCNC: 134 MMOL/L (ref 136–145)
TRIGL SERPL-MCNC: 80 MG/DL
TSH SERPL DL<=0.005 MIU/L-ACNC: 0.64 UIU/ML (ref 0.3–4.2)
WBC # BLD AUTO: 5.6 10E3/UL (ref 4–11)

## 2023-08-31 PROCEDURE — G0439 PPPS, SUBSEQ VISIT: HCPCS | Performed by: STUDENT IN AN ORGANIZED HEALTH CARE EDUCATION/TRAINING PROGRAM

## 2023-08-31 PROCEDURE — 83550 IRON BINDING TEST: CPT | Mod: ZL | Performed by: STUDENT IN AN ORGANIZED HEALTH CARE EDUCATION/TRAINING PROGRAM

## 2023-08-31 PROCEDURE — 85014 HEMATOCRIT: CPT | Mod: ZL | Performed by: STUDENT IN AN ORGANIZED HEALTH CARE EDUCATION/TRAINING PROGRAM

## 2023-08-31 PROCEDURE — 99214 OFFICE O/P EST MOD 30 MIN: CPT | Mod: 25 | Performed by: STUDENT IN AN ORGANIZED HEALTH CARE EDUCATION/TRAINING PROGRAM

## 2023-08-31 PROCEDURE — 80061 LIPID PANEL: CPT | Mod: ZL | Performed by: STUDENT IN AN ORGANIZED HEALTH CARE EDUCATION/TRAINING PROGRAM

## 2023-08-31 PROCEDURE — 83735 ASSAY OF MAGNESIUM: CPT | Mod: ZL | Performed by: STUDENT IN AN ORGANIZED HEALTH CARE EDUCATION/TRAINING PROGRAM

## 2023-08-31 PROCEDURE — G0463 HOSPITAL OUTPT CLINIC VISIT: HCPCS

## 2023-08-31 PROCEDURE — 80053 COMPREHEN METABOLIC PANEL: CPT | Mod: ZL | Performed by: STUDENT IN AN ORGANIZED HEALTH CARE EDUCATION/TRAINING PROGRAM

## 2023-08-31 PROCEDURE — 36415 COLL VENOUS BLD VENIPUNCTURE: CPT | Mod: ZL | Performed by: STUDENT IN AN ORGANIZED HEALTH CARE EDUCATION/TRAINING PROGRAM

## 2023-08-31 PROCEDURE — 84443 ASSAY THYROID STIM HORMONE: CPT | Mod: ZL | Performed by: STUDENT IN AN ORGANIZED HEALTH CARE EDUCATION/TRAINING PROGRAM

## 2023-08-31 RX ORDER — CITALOPRAM HYDROBROMIDE 20 MG/1
20 TABLET ORAL DAILY
Qty: 90 TABLET | Refills: 4 | Status: SHIPPED | OUTPATIENT
Start: 2023-08-31 | End: 2024-09-13

## 2023-08-31 RX ORDER — GABAPENTIN 100 MG/1
100 CAPSULE ORAL AT BEDTIME
Qty: 90 CAPSULE | Refills: 1 | Status: SHIPPED | OUTPATIENT
Start: 2023-08-31 | End: 2023-09-28

## 2023-08-31 RX ORDER — ZOLPIDEM TARTRATE 5 MG/1
5 TABLET ORAL
Qty: 30 TABLET | Refills: 3 | Status: SHIPPED | OUTPATIENT
Start: 2023-08-31 | End: 2024-05-06

## 2023-08-31 RX ORDER — ATORVASTATIN CALCIUM 20 MG/1
20 TABLET, FILM COATED ORAL DAILY
Qty: 90 TABLET | Refills: 4 | Status: SHIPPED | OUTPATIENT
Start: 2023-08-31 | End: 2024-09-13

## 2023-08-31 RX ORDER — HYDROCHLOROTHIAZIDE 25 MG/1
25 TABLET ORAL DAILY
Qty: 90 TABLET | Refills: 4 | Status: SHIPPED | OUTPATIENT
Start: 2023-08-31 | End: 2024-09-13

## 2023-08-31 RX ORDER — LEVOTHYROXINE SODIUM 100 UG/1
100 TABLET ORAL DAILY
Qty: 90 TABLET | Refills: 4 | Status: SHIPPED | OUTPATIENT
Start: 2023-08-31 | End: 2024-09-13

## 2023-08-31 RX ORDER — AMLODIPINE BESYLATE 5 MG/1
5 TABLET ORAL DAILY
Qty: 90 TABLET | Refills: 4 | Status: SHIPPED | OUTPATIENT
Start: 2023-08-31 | End: 2024-09-13

## 2023-08-31 RX ORDER — ATENOLOL 100 MG/1
100 TABLET ORAL DAILY
Qty: 90 TABLET | Refills: 3 | Status: SHIPPED | OUTPATIENT
Start: 2023-08-31 | End: 2024-09-13

## 2023-08-31 ASSESSMENT — ACTIVITIES OF DAILY LIVING (ADL): CURRENT_FUNCTION: NO ASSISTANCE NEEDED

## 2023-08-31 ASSESSMENT — ENCOUNTER SYMPTOMS
MYALGIAS: 0
NERVOUS/ANXIOUS: 1
JOINT SWELLING: 0
CHILLS: 0
FEVER: 0
COUGH: 0
DYSURIA: 0
BREAST MASS: 0
ABDOMINAL PAIN: 0
DIZZINESS: 0
WEAKNESS: 0
PARESTHESIAS: 0
EYE PAIN: 0
SORE THROAT: 0
FREQUENCY: 0
CONSTIPATION: 0
DIARRHEA: 0
HEMATOCHEZIA: 0
HEADACHES: 0
HEARTBURN: 0
SHORTNESS OF BREATH: 0
HEMATURIA: 0
NAUSEA: 0
PALPITATIONS: 0
ARTHRALGIAS: 1

## 2023-08-31 ASSESSMENT — PAIN SCALES - GENERAL: PAINLEVEL: NO PAIN (0)

## 2023-08-31 NOTE — LETTER
September 1, 2023      Bea R Mark  [address] [facility] [address]  [place] [address]        Dear ,    We are writing to inform you of your test results.    It was very nice to meet you today in clinic.  Your laboratory results are below.  There are a few that are just outside the normal range but nothing is of clinical significance.  Unfortunately your magnesium and iron levels are normal, so I do not have a more benign treatment for your restless leg syndrome.  Hopefully the gabapentin will both help you sleep and treat the restless legs.  Please follow-up in clinic if it is not helpful and we can try higher dose or other therapies as well.    Resulted Orders   Comprehensive Metabolic Panel   Result Value Ref Range    Sodium 134 (L) 136 - 145 mmol/L    Potassium 4.6 3.4 - 5.3 mmol/L    Chloride 96 (L) 98 - 107 mmol/L    Carbon Dioxide (CO2) 31 (H) 22 - 29 mmol/L    Anion Gap 7 7 - 15 mmol/L    Urea Nitrogen 14.7 8.0 - 23.0 mg/dL    Creatinine 0.84 0.51 - 0.95 mg/dL    Calcium 10.3 (H) 8.8 - 10.2 mg/dL    Glucose 98 70 - 99 mg/dL    Alkaline Phosphatase 23 (L) 35 - 104 U/L    AST 29 0 - 45 U/L      Comment:      Reference intervals for this test were updated on 6/12/2023 to more accurately reflect our healthy population. There may be differences in the flagging of prior results with similar values performed with this method. Interpretation of those prior results can be made in the context of the updated reference intervals.    ALT 32 0 - 50 U/L      Comment:      Reference intervals for this test were updated on 6/12/2023 to more accurately reflect our healthy population. There may be differences in the flagging of prior results with similar values performed with this method. Interpretation of those prior results can be made in the context of the updated reference intervals.      Protein Total 7.3 6.4 - 8.3 g/dL    Albumin 4.6 3.5 - 5.2 g/dL    Bilirubin Total 1.2 <=1.2 mg/dL    GFR Estimate 67 >60  mL/min/1.73m2   CBC W PLT No Diff   Result Value Ref Range    WBC Count 5.6 4.0 - 11.0 10e3/uL    RBC Count 4.57 3.80 - 5.20 10e6/uL    Hemoglobin 14.9 11.7 - 15.7 g/dL    Hematocrit 43.7 35.0 - 47.0 %    MCV 96 78 - 100 fL    MCH 32.6 26.5 - 33.0 pg    MCHC 34.1 31.5 - 36.5 g/dL    RDW 13.3 10.0 - 15.0 %    Platelet Count 172 150 - 450 10e3/uL   Lipid Panel   Result Value Ref Range    Cholesterol 129 <200 mg/dL    Triglycerides 80 <150 mg/dL    Direct Measure HDL 50 >=50 mg/dL    LDL Cholesterol Calculated 63 <=100 mg/dL    Non HDL Cholesterol 79 <130 mg/dL    Narrative    Cholesterol  Desirable:  <200 mg/dL    Triglycerides  Normal:  Less than 150 mg/dL  Borderline High:  150-199 mg/dL  High:  200-499 mg/dL  Very High:  Greater than or equal to 500 mg/dL    Direct Measure HDL  Female:  Greater than or equal to 50 mg/dL   Male:  Greater than or equal to 40 mg/dL    LDL Cholesterol  Desirable:  <100mg/dL  Above Desirable:  100-129 mg/dL   Borderline High:  130-159 mg/dL   High:  160-189 mg/dL   Very High:  >= 190 mg/dL    Non HDL Cholesterol  Desirable:  130 mg/dL  Above Desirable:  130-159 mg/dL  Borderline High:  160-189 mg/dL  High:  190-219 mg/dL  Very High:  Greater than or equal to 220 mg/dL   TSH   Result Value Ref Range    TSH 0.64 0.30 - 4.20 uIU/mL   Iron & Iron Binding Capacity   Result Value Ref Range    Iron 95 37 - 145 ug/dL    Iron Binding Capacity 287 240 - 430 ug/dL    Iron Sat Index 33 15 - 46 %   Magnesium   Result Value Ref Range    Magnesium 2.3 1.7 - 2.3 mg/dL       If you have any questions or concerns, please call the clinic at the number listed above.       Sincerely,      Matt Vásquez MD

## 2023-08-31 NOTE — PROGRESS NOTES
"SUBJECTIVE:   Bea is a 87 year old who presents for Preventive Visit.       No data to display              Patient here for medicare wellness visit. She is overall doing well. Has concerns about sleep. Has been on Ambien for a long time, initially started this due to starting a stressful job. She takes this every other night, works great, but the nights she does not take it she does not fall asleep until 6:30am and then sleeps during the day. Has talked to Dr. Giles (prior PCP) about this and he told her the risks and side effects including effects on memory. She also has restless legs at night, which keeps her up when she does not take Ambien. No other concerns today.    Are you in the first 12 months of your Medicare coverage?  No    Healthy Habits:     In general, how would you rate your overall health?  Good    Frequency of exercise:  6-7 days/week    Duration of exercise:  45-60 minutes    Do you usually eat at least 4 servings of fruit and vegetables a day, include whole grains    & fiber and avoid regularly eating high fat or \"junk\" foods?  Yes    Taking medications regularly:  Yes    Medication side effects:  None    Ability to successfully perform activities of daily living:  No assistance needed    Home Safety:  No safety concerns identified    Hearing Impairment:  Difficulty following a conversation in a noisy restaurant or crowded room, feel that people are mumbling or not speaking clearly, need to ask people to speak up or repeat themselves and difficulty understanding soft or whispered speech    In the past 6 months, have you been bothered by leaking of urine?  No    In general, how would you rate your overall mental or emotional health?  Fair    Additional concerns today:  Yes        Have you ever done Advance Care Planning? (For example, a Health Directive, POLST, or a discussion with a medical provider or your loved ones about your wishes): Yes, advance care planning is on file.       Fall " risk  Fallen 2 or more times in the past year?: Yes  Any fall with injury in the past year?: No    Cognitive Screening   1) Repeat 3 items (Leader, Season, Table)    2) Clock draw: NORMAL  3) 3 item recall: Recalls 3 objects  Results: 3 items recalled: COGNITIVE IMPAIRMENT LESS LIKELY    Mini-CogTM Copyright [employer]. Licensed by the author for use in [facility]; reprinted with permission (audelia@[facility]). All rights reserved.      Do you have sleep apnea, excessive snoring or daytime drowsiness? : yes    Reviewed and updated as needed this visit by clinical staff   Tobacco  Allergies  Meds      Soc Hx        Reviewed and updated as needed this visit by Provider                 Social History     Tobacco Use    Smoking status: Former     Types: Cigarettes     Quit date: 3/12/1990     Years since quittin.4    Smokeless tobacco: Never   Substance Use Topics    Alcohol use: Yes     Comment: occ wine             2023    10:59 AM   Alcohol Use   Prescreen: >3 drinks/day or >7 drinks/week? No     Do you have a current opioid prescription? No  Do you use any other controlled substances or medications that are not prescribed by a provider? None          Current providers sharing in care for this patient include:   Patient Care Team:  No Ref-Primary, Physician as PCP - Alicia Garcia PA-C as Assigned PCP    The following health maintenance items are reviewed in Epic and correct as of today:  Health Maintenance   Topic Date Due    COVID-19 Vaccine (5 - Moderna series) 06/10/2022    DTAP/TDAP/TD IMMUNIZATION (2 - Td or Tdap) 2023    MEDICARE ANNUAL WELLNESS VISIT  2023    TSH W/FREE T4 REFLEX  2023    INFLUENZA VACCINE (1) 2023    FALL RISK ASSESSMENT  2024    ADVANCE CARE PLANNING  2027    PHQ-2 (once per calendar year)  Completed    ZOSTER IMMUNIZATION  Addressed    IPV IMMUNIZATION  Aged Out    HPV IMMUNIZATION  Aged Out    MENINGITIS IMMUNIZATION   "Aged Out    MAMMO SCREENING  Discontinued    Pneumococcal Vaccine: 65+ Years  Discontinued       Review of Systems   Constitutional:  Negative for chills and fever.   HENT:  Positive for hearing loss. Negative for congestion, ear pain and sore throat.    Eyes:  Negative for pain and visual disturbance.   Respiratory:  Negative for cough and shortness of breath.    Cardiovascular:  Negative for chest pain, palpitations and peripheral edema.   Gastrointestinal:  Negative for abdominal pain, constipation, diarrhea, heartburn, hematochezia and nausea.   Breasts:  Negative for tenderness, breast mass and discharge.   Genitourinary:  Negative for dysuria, frequency, genital sores, hematuria, pelvic pain, urgency, vaginal bleeding and vaginal discharge.   Musculoskeletal:  Positive for arthralgias. Negative for joint swelling and myalgias.   Skin:  Negative for rash.   Neurological:  Negative for dizziness, weakness, headaches and paresthesias.   Psychiatric/Behavioral:  Negative for mood changes. The patient is nervous/anxious.        OBJECTIVE:   BP (!) 154/82 (BP Location: Right arm, Patient Position: Sitting, Cuff Size: Adult Regular)   Pulse 57   Temp 98.9  F (37.2  C) (Temporal)   Resp 18   Ht 1.626 m (5' 4\")   Wt 56.7 kg (125 lb)   SpO2 98%   Breastfeeding No   BMI 21.46 kg/m   Estimated body mass index is 21.46 kg/m  as calculated from the following:    Height as of this encounter: 1.626 m (5' 4\").    Weight as of this encounter: 56.7 kg (125 lb).  Physical Exam  GENERAL: healthy, alert and no distress  HEENT: oropharynx unremarkable  RESP: lungs clear to auscultation - no rales, rhonchi or wheezes  CV: regular rate and rhythm, normal S1 S2, no S3 or S4, no murmur, click or rub, no peripheral edema and peripheral pulses strong  MS: no gross musculoskeletal defects noted, no edema      ASSESSMENT / PLAN:       ICD-10-CM    1. Medicare annual wellness visit, subsequent  Z00.00 CBC W PLT No Diff     CBC W PLT " No Diff      2. Essential hypertension  I10 amLODIPine (NORVASC) 5 MG tablet     atenolol (TENORMIN) 100 MG tablet     hydrochlorothiazide (HYDRODIURIL) 25 MG tablet     Comprehensive Metabolic Panel     Comprehensive Metabolic Panel      3. Hyperlipidemia, unspecified hyperlipidemia type  E78.5 atorvastatin (LIPITOR) 20 MG tablet     Lipid Panel     Lipid Panel      4. Anxiety  F41.9 citalopram (CELEXA) 20 MG tablet     zolpidem (AMBIEN) 5 MG tablet      5. Hypothyroidism, unspecified type  E03.9 levothyroxine (SYNTHROID/LEVOTHROID) 100 MCG tablet     TSH     TSH      6. Primary insomnia  F51.01 zolpidem (AMBIEN) 5 MG tablet      7. Restless leg syndrome  G25.81 Iron & Iron Binding Capacity     Magnesium     gabapentin (NEURONTIN) 100 MG capsule     Iron & Iron Binding Capacity     Magnesium        Medicare wellness exam: Updated past medical history surgical history social history and medications.  She will get her tetanus vaccine at the pharmacy reviewed and renewed her medications.  Patient has graduated from colonoscopies and Pap smears.  Still performing mammograms.    Insomnia and RLS - will try stopping Ambien, start gabapentin at night to help with RLS, return to clinic if not working (can try higher gabapentin dose, add trazodone, or go back to Ambien if needed) sure normal iron and electrolytes after drawing basic screening labs today.    Follow-up with me in 1 year for annual physical exam, sooner if needed.    Patient has been advised of split billing requirements and indicates understanding: Yes      COUNSELING:  Reviewed preventive health counseling, as reflected in patient instructions       Regular exercise       Healthy diet/nutrition        She reports that she quit smoking about 33 years ago. Her smoking use included cigarettes. She has never used smokeless tobacco.      Appropriate preventive services were discussed with this patient, including applicable screening as appropriate for  cardiovascular disease, diabetes, osteopenia/osteoporosis, and glaucoma.  As appropriate for age/gender, discussed screening for colorectal cancer, prostate cancer, breast cancer, and cervical cancer. Checklist reviewing preventive services available has been given to the patient.    Reviewed patients plan of care and provided an AVS. The Basic Care Plan (routine screening as documented in Health Maintenance) for Bea meets the Care Plan requirement. This Care Plan has been established and reviewed with the Patient.          Patient seen and discussed with Dr. Vásquez, supervising physician.    Rosalio Orozco MD  Resident Physician  8/31/2023    I was present with Dr. Orozco PGY-3 and agree with his documentation and assessment of the patient. I have independently evaluated the patient and I agree with the assessment and plan of care as documented in the note.     Matt Vásquez MD on 8/31/2023 at 12:16 PM      Matt Vásquez MD  [facility]

## 2023-08-31 NOTE — NURSING NOTE
"Chief Complaint   Patient presents with    Medicare Visit     And Est Care        Medication reconciliation completed.    FOOD SECURITY SCREENING QUESTIONS:    The next two questions are to help us understand your food security.  If you are feeling you need any assistance in this area, we have resources available to support you today.    Hunger Vital Signs:  Within the past 12 months we worried whether our food would run out before we got money to buy more. Never  Within the past 12 months the food we bought just didn't last and we didn't have money to get more. Never    Initial BP (!) 154/82 (BP Location: Right arm, Patient Position: Sitting, Cuff Size: Adult Regular)   Pulse 57   Temp 98.9  F (37.2  C) (Temporal)   Resp 18   Ht 1.626 m (5' 4\")   Wt 56.7 kg (125 lb)   SpO2 98%   Breastfeeding No   BMI 21.46 kg/m   Estimated body mass index is 21.46 kg/m  as calculated from the following:    Height as of this encounter: 1.626 m (5' 4\").    Weight as of this encounter: 56.7 kg (125 lb).       April Casas LPN .......  8/31/2023  11:07 AM    "

## 2023-09-15 ENCOUNTER — TELEPHONE (OUTPATIENT)
Dept: INTERNAL MEDICINE | Facility: OTHER | Age: 88
End: 2023-09-15
Payer: COMMERCIAL

## 2023-09-15 NOTE — TELEPHONE ENCOUNTER
Please call with a work in with PCP for medication check.  Its not working.        Saundra Mccarthy on 9/15/2023 at 9:56 AM

## 2023-09-19 NOTE — TELEPHONE ENCOUNTER
Left message on answering machine for patient to call back.    April Casas LPN .......  9/19/2023  2:22 PM

## 2023-09-20 NOTE — TELEPHONE ENCOUNTER
[facility].      Please call mobile number, [phone].        Cat Carl on 9/20/2023 at 1:55 PM

## 2023-09-20 NOTE — TELEPHONE ENCOUNTER
Left message for patient to return call.   Melissa Jalloh LPN on 9/20/2023 at 11:15 AM     normal...

## 2023-09-22 NOTE — TELEPHONE ENCOUNTER
After verifying patient's name and date of birth, patient states she can not sleep.   She stopped taking the zolpidem the first week of this month.   She is taking the gabapentin.     April Casas LPN....9/22/2023 12:00 PM

## 2023-09-22 NOTE — TELEPHONE ENCOUNTER
"Alicia Guallpa, APRN CNP1 hour ago (12:17 PM)          Per 's last note- \"can try higher gabapentin dose, add trazodone, or go back to Ambien if needed.\" Please see what patient would like to do. Will need to schedule with an appointment if she is still having difficulties.                Note     "

## 2023-09-22 NOTE — TELEPHONE ENCOUNTER
After verifying patient's name and date of birth, patient was given the below information.  Patient stated that she already tired a double dose of gabapentin and it did not work.  She is willing to increase the gabapentin more if able and/or go back to the ambien.    April Casas, CLAUS....9/22/2023 2:15 PM

## 2023-09-22 NOTE — TELEPHONE ENCOUNTER
I can give her prescription for trazodone if she would like to try that.  Ambien is quite dangerous in individuals over age 65.    Matt Vásquez MD on 9/22/2023 at 2:41 PM

## 2023-09-22 NOTE — CONFIDENTIAL NOTE
"  Per 's last note- \"can try higher gabapentin dose, add trazodone, or go back to Ambien if needed.\" Please see what patient would like to do. Will need to schedule with an appointment if she is still having difficulties.       "

## 2023-09-24 NOTE — TELEPHONE ENCOUNTER
Please assist patient in getting an appointment set up-this can be a phone visit to discuss further.  We will need to discuss the risks and benefits on whether she should continue on Ambien.

## 2023-09-25 NOTE — TELEPHONE ENCOUNTER
Spoke to patient and scheduled telephone visit with Alicia on 9/28/23.    Ngoc Elliott on 9/25/2023 at 11:26 AM

## 2023-09-28 ENCOUNTER — VIRTUAL VISIT (OUTPATIENT)
Dept: INTERNAL MEDICINE | Facility: OTHER | Age: 88
End: 2023-09-28
Payer: COMMERCIAL

## 2023-09-28 DIAGNOSIS — G25.81 RESTLESS LEG SYNDROME: ICD-10-CM

## 2023-09-28 DIAGNOSIS — F51.01 PRIMARY INSOMNIA: Primary | ICD-10-CM

## 2023-09-28 PROCEDURE — 99214 OFFICE O/P EST MOD 30 MIN: CPT | Mod: TEL

## 2023-09-28 PROCEDURE — G0463 HOSPITAL OUTPT CLINIC VISIT: HCPCS | Mod: TEL

## 2023-09-28 RX ORDER — GABAPENTIN 100 MG/1
100-300 CAPSULE ORAL AT BEDTIME
Qty: 90 CAPSULE | Refills: 1 | Status: SHIPPED | OUTPATIENT
Start: 2023-09-28 | End: 2024-02-12

## 2023-09-28 RX ORDER — ZOLPIDEM TARTRATE 5 MG/1
5 TABLET ORAL
Qty: 30 TABLET | Refills: 3 | Status: SHIPPED | OUTPATIENT
Start: 2023-09-28 | End: 2024-02-12

## 2023-09-28 NOTE — PROGRESS NOTES
Bea is a 87 year old who is being evaluated via a billable telephone visit.      What phone number would you like to be contacted at? [phone]  How would you like to obtain your AVS? Mail a copy    Distant Location (provider location):  On-site    Assessment & Plan   Bea Flores is a 87 year old presenting for the following health issues:      ICD-10-CM    1. Primary insomnia  F51.01 zolpidem (AMBIEN) 5 MG tablet      2. Restless leg syndrome  G25.81 gabapentin (NEURONTIN) 100 MG capsule          We discussed medication options in depth today.  Patient is aware that Ambien is not advised in the elderly population and that it places her at increased risk for dizziness, balance problems, memory problems, falls.  Patient acknowledges this risk and states that she would like to return to this medication.  She has found gabapentin to be helpful for restless legs and would like to continue to have this medication as an option.  She reports that she will not take gabapentin and Ambien at the same time.  She is interested in trying an increased dose of gabapentin prior to returning to Ambien.  Instructed patient that she may take 300 mg at bedtime to see if this helps with insomnia but I did instruct her not to take these medications at the same time.  Patient verbalized understanding of this.        No follow-ups on file.    [facility]      Subjective   Bea is a 87 year old, presenting for the following health issues:      Patient presents for phone visit to discuss chronic insomnia and RLS.  She was on Ambien 5 mg for over 10 years.  At last clinic visit risks of this medication with her age were discussed and she trialed discontinuing this and started on gabapentin.  Patient states that her insomnia is very bad.  That the gabapentin has helped with her restless legs however she would like to continue taking Ambien.  She has tolerated this medication in the past.   Denies having difficulties with memory or balance problems or falls as a result of this medication.        Recheck Medication (Was changed from Ambien to Gabapentin and is not working well)      History of Present Illness       Reason for visit:  Follow up on medications    She eats 2-3 servings of fruits and vegetables daily.She consumes 0 sweetened beverage(s) daily.She exercises with enough effort to increase her heart rate 10 to 19 minutes per day.  She exercises with enough effort to increase her heart rate 4 days per week.   She is taking medications regularly.               Review of Systems   Psychiatric/Behavioral:  Positive for sleep disturbance. Negative for behavioral problems, hallucinations and mood changes.             Objective           Vitals:  No vitals were obtained today due to virtual visit.    Physical Exam   healthy, alert, and no distress  PSYCH: Alert and oriented times 3; coherent speech, normal   rate and volume, able to articulate logical thoughts, able   to abstract reason, no tangential thoughts, no hallucinations   or delusions  Her affect is normal  RESP: No cough, no audible wheezing, able to talk in full sentences  Remainder of exam unable to be completed due to telephone visits                Phone call duration: 12 minutes

## 2023-09-28 NOTE — NURSING NOTE
"Seen by Dr Vásquez and wanted her off Ambien since she has been on it for 10 years. Changed to Gabapentin 100 mg. It has help her restless legs but not sleeping. She would like an increase in Gabapentin and have Ambien as a PRN medication   Melissa Prince LPN on 9/28/2023 at 12:50 PM    Chief Complaint   Patient presents with    Recheck Medication     Was changed from Ambien to Gabapentin and is not working well       Initial There were no vitals taken for this visit. Estimated body mass index is 21.46 kg/m  as calculated from the following:    Height as of 8/31/23: 1.626 m (5' 4\").    Weight as of 8/31/23: 56.7 kg (125 lb).  Medication Reconciliation: complete    FOOD SECURITY SCREENING QUESTIONS  Hunger Vital Signs:  Within the past 12 months we worried whether our food would run out before we got money to buy more. Never  Within the past 12 months the food we bought just didn't last and we didn't have money to get more. Never  Melissa Prince LPN 9/28/2023 12:50 PM   "

## 2023-09-29 ASSESSMENT — ENCOUNTER SYMPTOMS
SLEEP DISTURBANCE: 1
HALLUCINATIONS: 0

## 2023-10-10 ENCOUNTER — APPOINTMENT (OUTPATIENT)
Dept: OTOLARYNGOLOGY | Facility: OTHER | Age: 88
End: 2023-10-10
Attending: OTOLARYNGOLOGY
Payer: COMMERCIAL

## 2023-10-19 ENCOUNTER — TELEPHONE (OUTPATIENT)
Dept: INTERNAL MEDICINE | Facility: OTHER | Age: 88
End: 2023-10-19
Payer: COMMERCIAL

## 2023-10-19 DIAGNOSIS — H90.6 MIXED HEARING LOSS, BILATERAL: Primary | ICD-10-CM

## 2024-01-09 ENCOUNTER — OFFICE VISIT (OUTPATIENT)
Dept: OTOLARYNGOLOGY | Facility: OTHER | Age: 89
End: 2024-01-09
Payer: MEDICARE

## 2024-01-09 DIAGNOSIS — H90.3 SENSORINEURAL HEARING LOSS (SNHL) OF BOTH EARS: Primary | ICD-10-CM

## 2024-01-09 PROCEDURE — G0463 HOSPITAL OUTPT CLINIC VISIT: HCPCS

## 2024-01-10 ENCOUNTER — OFFICE VISIT (OUTPATIENT)
Dept: FAMILY MEDICINE | Facility: OTHER | Age: 89
End: 2024-01-10
Attending: STUDENT IN AN ORGANIZED HEALTH CARE EDUCATION/TRAINING PROGRAM
Payer: MEDICARE

## 2024-01-10 ENCOUNTER — HOSPITAL ENCOUNTER (OUTPATIENT)
Dept: GENERAL RADIOLOGY | Facility: OTHER | Age: 89
Discharge: HOME | End: 2024-01-10
Payer: MEDICARE

## 2024-01-10 VITALS
TEMPERATURE: 99.1 F | SYSTOLIC BLOOD PRESSURE: 156 MMHG | WEIGHT: 125 LBS | HEIGHT: 66 IN | HEART RATE: 72 BPM | BODY MASS INDEX: 20.09 KG/M2 | DIASTOLIC BLOOD PRESSURE: 74 MMHG | RESPIRATION RATE: 18 BRPM | OXYGEN SATURATION: 94 %

## 2024-01-10 DIAGNOSIS — J18.9 PNEUMONIA OF BOTH LOWER LOBES DUE TO INFECTIOUS ORGANISM: Primary | ICD-10-CM

## 2024-01-10 DIAGNOSIS — R50.9 FEVER IN ADULT: ICD-10-CM

## 2024-01-10 DIAGNOSIS — R05.8 PRODUCTIVE COUGH: ICD-10-CM

## 2024-01-10 DIAGNOSIS — J21.0 RSV BRONCHIOLITIS: ICD-10-CM

## 2024-01-10 LAB
FLUAV RNA SPEC QL NAA+PROBE: NEGATIVE
FLUBV RNA RESP QL NAA+PROBE: NEGATIVE
RSV RNA SPEC NAA+PROBE: POSITIVE
SARS-COV-2 RNA RESP QL NAA+PROBE: NEGATIVE

## 2024-01-10 PROCEDURE — G0463 HOSPITAL OUTPT CLINIC VISIT: HCPCS | Mod: 25

## 2024-01-10 PROCEDURE — 87637 SARSCOV2&INF A&B&RSV AMP PRB: CPT | Mod: ZL

## 2024-01-10 PROCEDURE — 71046 X-RAY EXAM CHEST 2 VIEWS: CPT

## 2024-01-10 PROCEDURE — 99214 OFFICE O/P EST MOD 30 MIN: CPT

## 2024-01-10 PROCEDURE — G0463 HOSPITAL OUTPT CLINIC VISIT: HCPCS

## 2024-01-10 RX ORDER — BENZONATATE 100 MG/1
100 CAPSULE ORAL 3 TIMES DAILY PRN
Qty: 30 CAPSULE | Refills: 0 | Status: SHIPPED | OUTPATIENT
Start: 2024-01-10 | End: 2024-02-12

## 2024-01-10 RX ORDER — AZITHROMYCIN 250 MG/1
TABLET, FILM COATED ORAL
Qty: 6 TABLET | Refills: 0 | Status: SHIPPED | OUTPATIENT
Start: 2024-01-10 | End: 2024-01-15

## 2024-01-10 RX ORDER — AMOXICILLIN 500 MG/1
1000 CAPSULE ORAL 3 TIMES DAILY
Qty: 42 CAPSULE | Refills: 0 | Status: SHIPPED | OUTPATIENT
Start: 2024-01-10 | End: 2024-01-17

## 2024-01-10 NOTE — NURSING NOTE
"Chief Complaint   Patient presents with    Cough     Congestion, fever since 12/30/2023.         Initial BP (!) 156/74 (BP Location: Right arm, Patient Position: Sitting, Cuff Size: Adult Small)   Pulse 72   Temp 99.1  F (37.3  C) (Temporal)   Resp 18   Ht 1.676 m (5' 6\")   Wt 56.7 kg (125 lb)   SpO2 94%   BMI 20.18 kg/m   Estimated body mass index is 20.18 kg/m  as calculated from the following:    Height as of this encounter: 1.676 m (5' 6\").    Weight as of this encounter: 56.7 kg (125 lb).     Advance Care Directive on file? no  Advance Care Directive provided to patient? no    FOOD SECURITY SCREENING QUESTIONS:    The next two questions are to help us understand your food security.  If you are feeling you need any assistance in this area, we have resources available to support you today.    Hunger Vital Signs:  Within the past 12 months we worried whether our food would run out before we got money to buy more. Never  Within the past 12 months the food we bought just didn't last and we didn't have money to get more. Never  Jillian Cano LPN on 1/10/2024 at 10:17 AM      Jillian Cano     "

## 2024-01-10 NOTE — PROGRESS NOTES
ASSESSMENT/PLAN:    I have reviewed the nursing notes.  I have reviewed the findings, diagnosis, plan and need for follow up with the patient.    1. Pneumonia of both lower lobes due to infectious organism  2. RSV bronchiolitis  3. Productive cough  4. Fever in adult  - Symptomatic Influenza A/B, RSV, & SARS-CoV2 PCR (COVID-19) Nose  - XR Chest 2 Views  - amoxicillin (AMOXIL) 500 MG capsule; Take 2 capsules (1,000 mg) by mouth 3 times daily for 7 days  Dispense: 42 capsule; Refill: 0  - azithromycin (ZITHROMAX) 250 MG tablet; Take 2 tablets (500 mg) by mouth daily for 1 day, THEN 1 tablet (250 mg) daily for 4 days.  Dispense: 6 tablet; Refill: 0  - benzonatate (TESSALON) 100 MG capsule; Take 1 capsule (100 mg) by mouth 3 times daily as needed for cough  Dispense: 30 capsule; Refill: 0    Patient presents with upper respiratory symptoms.  Patient is stable and nontoxic.  Chest x-ray shows bilateral lower lobe pneumonia.  Patient also tested positive for RSV.  Discussed with patient that her RSV most likely led to her pneumonia.  Will treat with amoxicillin and azithromycin.  Will also provide patient with a prescription for Tessalon Perles to take as needed for cough.  Offered patient an albuterol inhaler but she declined as she is not experiencing any shortness of breath or wheezing.  Advised patient that she should avoid contact with others until she is fever free for 24 hours. Discussed symptomatic treatment - Encouraged fluids, salt water gargles, honey (only if greater than 1 year in age due to risk of botulism), elevation, humidifier, sinus rinse/netti pot, lozenges, tea, topical vapor rub, popsicles, rest, etc. May use over-the-counter Tylenol or ibuprofen PRN.  Advised patient that she should follow-up with her PCP in 2 to 4 weeks for repeat chest x-ray to make sure that her infection has resolved.    Discussed warning signs/symptoms indicative of need to f/u    Follow up if symptoms persist or worsen or  concerns    I explained my diagnostic considerations and recommendations to the patient, who voiced understanding and agreement with the treatment plan. All questions were answered. We discussed potential side effects of any prescribed or recommended therapies, as well as expectations for response to treatments.    EMELY Thompson CNP  1/10/2024  10:21 AM    HPI:    Bea Flores is a 88 year old female  who [facility] for concerns of URI symptoms    URI, x 1.5 weeks    Symptoms:  YES: +  fevers or chills. Fever, highest reported temperature: 99.1 F  No sore throat/pharyngitis/tonsillitis.   YES: +  allergy/URI Symptoms  No muffled sounds/change in hearing  No sensation of fullness in ear(s)  No ringing in ears/tinnitus  No balance changes  No dizziness  YES: +  congestion (head/nasal/chest)  YES: +  productive cough  YES: +  post nasal drip   YES: +  headache  YES: +  sinus pain/pressure  No myalgias  No otalgia  No rash  Activity Level Changes: Yes: increased fatigue  Appetite/Liquid Intake Changes: No  Changes to Bowel Habits: No  Changes to Bladder Habits: No  Additional Symptoms to Report: Yes: nausea  History of similar symptoms: No  Prior workup: No    Treatments tried: Tylenol/Ibuprofen, Fluids, and Rest    Site of exposure: family  Type of exposure: colds    Other Pertinent History: s/p tonsillectomy    Allergies: reviewed    PCP: Fahad    Past Medical History:   Diagnosis Date    Anxiety     Cerebral infarction (H)     2009,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    Essential (primary) hypertension     No Comments Provided    Hyperlipidemia     No Comments Provided    Hypothyroidism     No Comments Provided    Insomnia     Migraine with aura and without status migrainosus, not intractable     No Comments Provided    Postmenopausal atrophic vaginitis     No Comments Provided    Sciatica of right side     Intermittent right sciatica    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    EXTRACAPSULAR CATARACT EXTRATION WITH INTRAOCULAR LENS IMPLANT Bilateral     No Comments Provided    TONSILLECTOMY, ADENOIDECTOMY, COMBINED      No Comments Provided     Social History     Tobacco Use    Smoking status: Former     Types: Cigarettes     Quit date: 3/12/1990     Years since quittin.8    Smokeless tobacco: Never   Substance Use Topics    Alcohol use: Yes     Comment: occ wine     Current Outpatient Medications   Medication Sig Dispense Refill    acetaminophen (TYLENOL) 500 MG tablet Take 1,000 mg by mouth every 6 hours as needed Max acetaminophen dose: 4000 mg in 24 hours      amLODIPine (NORVASC) 5 MG tablet Take 1 tablet (5 mg) by mouth daily 90 tablet 4    aspirin (GOODSENSE ASPIRIN) 325 MG tablet Take 0.5 tablets (162.5 mg) by mouth daily with food      atenolol (TENORMIN) 100 MG tablet Take 1 tablet (100 mg) by mouth daily 90 tablet 3    atorvastatin (LIPITOR) 20 MG tablet Take 1 tablet (20 mg) by mouth daily 90 tablet 4    Calcium Carbonate-Vit D-Min (CALCIUM 600+D PLUS MINERALS) 600-400 MG-UNIT TABS Take 1 tablet by mouth daily with food      citalopram (CELEXA) 20 MG tablet Take 1 tablet (20 mg) by mouth daily 90 tablet 4    gabapentin (NEURONTIN) 100 MG capsule Take 1-3 capsules (100-300 mg) by mouth At Bedtime 90 capsule 1    hydrochlorothiazide (HYDRODIURIL) 25 MG tablet Take 1 tablet (25 mg) by mouth daily 90 tablet 4    levothyroxine (SYNTHROID/LEVOTHROID) 100 MCG tablet Take 1 tablet (100 mcg) by mouth daily 90 tablet 4    zolpidem (AMBIEN) 5 MG tablet Take 1 tablet (5 mg) by mouth nightly as needed for sleep 30 tablet 3    zolpidem (AMBIEN) 5 MG tablet Take 1 tablet (5 mg) by mouth nightly as needed for sleep (Patient not taking: Reported on 2023) 30 tablet 3     Allergies  "  Allergen Reactions    Trazodone Shortness Of Breath and Unknown    Codeine Other (See Comments)     hallucinations    Lisinopril Cough    Pneumococcal 13-Bertha Conj Vacc Other (See Comments)     Arm swelling       Past medical history, past surgical history, current medications and allergies reviewed and accurate to the best of my knowledge.      ROS:  Refer to HPI    BP (!) 156/74 (BP Location: Right arm, Patient Position: Sitting, Cuff Size: Adult Small)   Pulse 72   Temp 99.1  F (37.3  C) (Temporal)   Resp 18   Ht 1.676 m (5' 6\")   Wt 56.7 kg (125 lb)   SpO2 94%   BMI 20.18 kg/m      EXAM:  General Appearance: Well appearing 88 year old female, appropriate appearance for age. No acute distress   Ears: Left TM intact, translucent with bony landmarks appreciated, no erythema, no effusion, no bulging, no purulence.  Right TM intact, translucent with bony landmarks appreciated, no erythema, no effusion, no bulging, no purulence.  Left auditory canal clear.  Right auditory canal clear.  Normal external ears, non tender.  Eyes: conjunctivae normal without erythema or irritation, corneas clear, no drainage or crusting, no eyelid swelling, pupils equal   Oropharynx: moist mucous membranes, posterior pharynx without erythema, tonsils absent, no post nasal drip seen, no trismus, voice clear.    Sinuses:  No sinus tenderness upon palpation of the frontal or maxillary sinuses  Nose:  Bilateral nares: no erythema, no edema, no drainage or congestion   Neck: supple without adenopathy  Respiratory: normal chest wall and respirations.  Normal effort.  Clear to auscultation bilaterally, no wheezing, crackles or rhonchi.  No increased work of breathing.  Moderate productive cough appreciated.  Cardiac: RRR with no murmurs  Musculoskeletal:  Equal movement of bilateral upper extremities.  Equal movement of bilateral lower extremities.  Normal gait.    Dermatological: no rashes noted of exposed skin  Neuro: Alert and oriented " to person, place, and time.    Psychological: normal affect, alert, oriented, and pleasant.     Labs & Xray:  Results for orders placed or performed in visit on 01/10/24   XR Chest 2 Views     Status: None    Narrative    Procedure:XR CHEST 2 VIEWS    Clinical history:Female, 88 years, Productive cough; Fever in adult    Technique: Two views are submitted.    Comparison: No relevant prior imaging.    Findings: The cardiac silhouette is within normal limits.. The  pulmonary vasculature is within normal limits.    The lungs demonstrate patchy areas of airspace consolidation involving  the lower lobes and lingula Bony structures are unremarkable.      Impression    Impression:   Lingula and bilateral lower lobe pneumonia.    GUSTABO ANGELES MD         SYSTEM ID:  [id number]   Symptomatic Influenza A/B, RSV, & SARS-CoV2 PCR (COVID-19) Nose     Status: Abnormal    Specimen: Nose; Swab   Result Value Ref Range    Influenza A PCR Negative Negative    Influenza B PCR Negative Negative    RSV PCR Positive (A) Negative    SARS CoV2 PCR Negative Negative    Narrative    Testing was performed using the Xpert Xpress CoV2/Flu/RSV Assay on the [employer] GeneXpert Instrument. This test should be ordered for the detection of SARS-CoV-2, influenza, and RSV viruses in individuals who meet clinical and/or epidemiological criteria. Test performance is unknown in asymptomatic patients. This test is for in vitro diagnostic use under the FDA EUA for laboratories certified under CLIA to perform high or moderate complexity testing. This test has not been FDA cleared or approved. A negative result does not rule out the presence of PCR inhibitors in the specimen or target RNA in concentration below the limit of detection for the assay. If only one viral target is positive but coinfection with multiple targets is suspected, the sample should be re-tested with another FDA cleared, approved, or authorized test, if coinfection would change clinical  management. This test was validated by the [facility]. These laboratories are certified under the Clinical Laboratory Improvement Amendments of 1988 (CLIA-88) as qualified to perform high complexity laboratory testing.

## 2024-01-11 NOTE — PROGRESS NOTES
document embedded image  Patient Name: Bea Flores   Address: [address] [place] [address]    YOB: 1935   [place] [address]   MR Number: [id number]   Phone: [phone]  PCP: Evan Giles MD           Appointment Date: 01/09/24  Visit Provider: Bentley Abdi MD    cc: Evan Giles MD; ~    ENT Progress Note    Intake  Visit Reasons: Hearing test    HPI  History of Present Illness  Chief complaint:  Worsening hearing     History  The patient is an 88-year-old female who is noted hearing loss for 30 years.  She has been a hearing aid user for the last 15.  She is become frustrated of [place] she does not feel her hearing aids are as effective as they once were.    Exam   The external auditory canals were cleared of cerumen.  The eardrums appear healthy and intact bilaterally  Audiogram-her hearing test demonstrates symmetric pure tone thresholds with speech reception threshold of 65 decibels on the right and 60 decibels on the left.  Her discrimination has dropped out in her right ear in his now at 60% compared to 92% in her left.    Allergies    codeine Adverse Reaction (Verified 01/10/24 09:30)  Unknown  lisinopril Adverse Reaction (Verified 01/10/24 09:30)  Cough  pneumococcal vaccine Adverse Reaction (Verified 01/10/24 09:30)  Unknown  trazodone Adverse Reaction (Verified 01/10/24 09:30)  Difficulty Breathing    PFSH  PFSH:     Past Medical History: (Updated 01/10/24 @ 09:31 by Jennifer Botello, Med Assist)    Thyroid disorder  Stroke  Hypertension  Chronic headaches  Diagnosis unknown  No history in eCW      Past Surgical History: (Updated 01/10/24 @ 09:32 by Jennifer Botello Med Assist)    H/O breast biopsy  Hx of tonsillectomy      Family History: (Updated 01/10/24 @ 09:33 by Jennifer Botello, Med Assist)  Other  Cancer  Coronary artery disease  Depression  Diabetes mellitus  Stroke  Tinnitus        Social History: (Updated 01/10/24 @ 09:33 by Jennifer Botello, Med Assist)  Smoking  Status:  Never smoker   second hand exposure:  No   alcohol intake:  current alcohol intake frequency: holidays/special occasions only   substance use type:  does not use   marital status:  /     Vital Signs      01/10/24  09:30  Height   5 ft 6 in  Height (cm)   167.6  Weight   56.245 kg  Weight (lb)   124 lbs and  0.0  ozs   Weight Measurement Method   Stated by Patient  BMI   20.0  BSA   1.63    A&P  Assessment & Plan  (1) Sensorineural hearing loss (SNHL) of both ears:         Status: Acute        Code(s):  H90.3 - Sensorineural hearing loss, bilateral  She was encouraged to try trials of aiding her left ear only and see if this helps her understand a little better.  She is welcome to follow up if she has further changes or has difficulties with her hearing aids.                Bentley Abdi MD    Filed: 01/10/24 4995     <Electronically signed by Bentley Abdi MD> 01/10/24 0479

## 2024-02-12 ENCOUNTER — OFFICE VISIT (OUTPATIENT)
Dept: INTERNAL MEDICINE | Facility: OTHER | Age: 89
End: 2024-02-12
Attending: STUDENT IN AN ORGANIZED HEALTH CARE EDUCATION/TRAINING PROGRAM
Payer: COMMERCIAL

## 2024-02-12 VITALS
RESPIRATION RATE: 16 BRPM | TEMPERATURE: 96.9 F | HEART RATE: 57 BPM | SYSTOLIC BLOOD PRESSURE: 136 MMHG | DIASTOLIC BLOOD PRESSURE: 78 MMHG | WEIGHT: 121.8 LBS | OXYGEN SATURATION: 98 % | HEIGHT: 63 IN | BODY MASS INDEX: 21.58 KG/M2

## 2024-02-12 DIAGNOSIS — J12.1 RSV (RESPIRATORY SYNCYTIAL VIRUS PNEUMONIA): ICD-10-CM

## 2024-02-12 DIAGNOSIS — F51.01 PRIMARY INSOMNIA: Primary | ICD-10-CM

## 2024-02-12 PROCEDURE — G0463 HOSPITAL OUTPT CLINIC VISIT: HCPCS

## 2024-02-12 PROCEDURE — 99213 OFFICE O/P EST LOW 20 MIN: CPT | Performed by: STUDENT IN AN ORGANIZED HEALTH CARE EDUCATION/TRAINING PROGRAM

## 2024-02-12 RX ORDER — ZOLPIDEM TARTRATE 5 MG/1
5 TABLET ORAL
Qty: 30 TABLET | Refills: 3 | Status: SHIPPED | OUTPATIENT
Start: 2024-02-12 | End: 2024-09-13

## 2024-02-12 RX ORDER — LATANOPROST 50 UG/ML
SOLUTION/ DROPS OPHTHALMIC
COMMUNITY
Start: 2024-01-25

## 2024-02-12 RX ORDER — QUETIAPINE FUMARATE 25 MG/1
12.5-25 TABLET, FILM COATED ORAL
Qty: 90 TABLET | Refills: 4 | Status: SHIPPED | OUTPATIENT
Start: 2024-02-12 | End: 2024-05-06

## 2024-02-12 RX ORDER — PREDNISOLONE ACETATE 10 MG/ML
SUSPENSION/ DROPS OPHTHALMIC
COMMUNITY
Start: 2024-02-06

## 2024-02-12 ASSESSMENT — PAIN SCALES - GENERAL: PAINLEVEL: NO PAIN (0)

## 2024-02-12 NOTE — PROGRESS NOTES
"  Assessment & Plan         ICD-10-CM    1. Primary insomnia  F51.01 QUEtiapine (SEROQUEL) 25 MG tablet     zolpidem (AMBIEN) 5 MG tablet      2. RSV (respiratory syncytial virus pneumonia)  J12.1         Insomnia: Did not tolerate her gabapentin as it made her \"hung over\" requesting to retry Ambien.  She has tried trazodone melatonin already.  Discussed utilizing quetiapine 12.5 mg to 25 mg at bedtime if this does not improve her sleep in addition to sleep hygiene techniques that we discussed in clinic she will resume her Ambien.    RSV pneumonia: Symptoms have resolved.  Bilateral pneumonia was likely just a viral infection rather than bacterial overlap but has completed course of antibiotics.      Follow-up with me this fall for annual physical exam, sooner if needed.      No follow-ups on file.    Matt Vásquez MD  [facility]      Raymundo Figueredo is a 88 year old, presenting for the following health issues:  Follow Up (Pneumonia and RSV per patient )        2/12/2024    12:41 PM   Additional Questions   Roomed by PHILIPPE Pineda   Accompanied by Self     History of Present Illness       Reason for visit:  Follow up on rsv    She eats 2-3 servings of fruits and vegetables daily.She exercises with enough effort to increase her heart rate 30 to 60 minutes per day.       Recent urgent care visit.   Pneumonia, bilateral.  Treated with amoxicillin and azithromycin.  [facility]ed [facility], recent internal medicine notes for insomnia      Taking gabapentin to help her sleep since 9/28.   Messes up her head. Feels foggy. Does not like it.   Difficulty going to sleep, can stay asleep.   Had been on ambien for 10 years  Didn't tolerate trazodone in the past.   No effect with melatonin.   Falls asleep with ambien almost instantly.               Objective    /78   Pulse 57   Temp 96.9  F (36.1  C) (Tympanic)   Resp 16   Ht 1.588 m (5' 2.5\")   Wt 55.2 kg (121 lb 12.8 oz)   SpO2 98%   " Breastfeeding No   BMI 21.92 kg/m    Body mass index is 21.92 kg/m .  Physical Exam   General: Pleasant 88-year-old woman sitting clinic no acute distress  Pulmonary: Clear to auscultation no crackles or wheezing.            Signed Electronically by: Matt Vásquez MD

## 2024-02-12 NOTE — NURSING NOTE
"Chief Complaint   Patient presents with    Follow Up     Pneumonia and RSV per patient        Initial /78   Pulse 57   Temp 96.9  F (36.1  C) (Tympanic)   Resp 16   Ht 1.588 m (5' 2.5\")   Wt 55.2 kg (121 lb 12.8 oz)   SpO2 98%   Breastfeeding No   BMI 21.92 kg/m   Estimated body mass index is 21.92 kg/m  as calculated from the following:    Height as of this encounter: 1.588 m (5' 2.5\").    Weight as of this encounter: 55.2 kg (121 lb 12.8 oz).  Medication Review: complete    The next two questions are to help us understand your food security.  If you are feeling you need any assistance in this area, we have resources available to support you today.          2/12/2024   SDOH- Food Insecurity   Within the past 12 months, did you worry that your food would run out before you got money to buy more? N   Within the past 12 months, did the food you bought just not last and you didn t have money to get more? N         Health Care Directive:  Patient does not have a Health Care Directive or Living Will: Discussed advance care planning with patient; however, patient declined at this time.    Miriam Field CMA      "

## 2024-03-02 NOTE — TELEPHONE ENCOUNTER
zolpidem (AMBIEN) 5 MG tablet (Discontinued) 30 tablet 0 12/16/2021 3/1/2022 No   Sig: TAKE 1 TABLET BY MOUTH NIGHTLY AS NEEDED     Last Office Visit:              3/1/22  Future Office visit:           None    Jenni Yanes RN .............. 5/9/2022  12:03 PM  
Requesting Zolpidem refill, needs new order for this, per pharmacy, please send to [facility], call if questions, thank you!      Radha Boland on 5/9/2022 at 11:59 AM    
all other ROS negative except as per HPI

## 2024-05-06 ENCOUNTER — OFFICE VISIT (OUTPATIENT)
Dept: INTERNAL MEDICINE | Facility: OTHER | Age: 89
End: 2024-05-06
Attending: NURSE PRACTITIONER
Payer: COMMERCIAL

## 2024-05-06 VITALS
HEART RATE: 64 BPM | TEMPERATURE: 97.8 F | OXYGEN SATURATION: 97 % | SYSTOLIC BLOOD PRESSURE: 130 MMHG | RESPIRATION RATE: 18 BRPM | DIASTOLIC BLOOD PRESSURE: 76 MMHG | BODY MASS INDEX: 20.28 KG/M2 | HEIGHT: 66 IN | WEIGHT: 126.2 LBS

## 2024-05-06 DIAGNOSIS — G25.81 RESTLESS LEG SYNDROME: Primary | ICD-10-CM

## 2024-05-06 DIAGNOSIS — F45.8 OTHER SOMATOFORM DISORDERS: ICD-10-CM

## 2024-05-06 LAB
FERRITIN SERPL-MCNC: 164 NG/ML (ref 11–328)
HOLD SPECIMEN: NORMAL

## 2024-05-06 PROCEDURE — 82728 ASSAY OF FERRITIN: CPT | Mod: ZL | Performed by: NURSE PRACTITIONER

## 2024-05-06 PROCEDURE — G0463 HOSPITAL OUTPT CLINIC VISIT: HCPCS

## 2024-05-06 PROCEDURE — 99213 OFFICE O/P EST LOW 20 MIN: CPT | Performed by: NURSE PRACTITIONER

## 2024-05-06 PROCEDURE — 36415 COLL VENOUS BLD VENIPUNCTURE: CPT | Mod: ZL | Performed by: NURSE PRACTITIONER

## 2024-05-06 RX ORDER — ROPINIROLE 0.25 MG/1
0.25 TABLET, FILM COATED ORAL AT BEDTIME
Qty: 30 TABLET | Refills: 4 | Status: SHIPPED | OUTPATIENT
Start: 2024-05-06

## 2024-05-06 ASSESSMENT — PAIN SCALES - GENERAL: PAINLEVEL: NO PAIN (0)

## 2024-05-06 NOTE — PROGRESS NOTES
ICD-10-CM    1. Restless leg syndrome  G25.81 Ferritin     rOPINIRole (REQUIP) 0.25 MG tablet     Ferritin      2. Other somatoform disorders  F45.8 Ferritin     Ferritin         Plan:  Check ferritin level.  If less than 75 then consider iron infusions.  Trial of Requip 0.75 mg every bedtime.  Recommend she take this about an hour prior to bedtime.  She is no longer taking Ambien.  If this medication does not work for her we could titrate up.  If she has side effects then she would want to return back to Ambien.    Raymunod Figueredo is a 88 year old, presenting for the following health issues:  Consult (Restless Leg Syndrome )      5/6/2024    10:31 AM   Additional Questions   Roomed by Earl Armijo LPN     She is here today for restless leg syndrome.  For many years she has taken Ambien which has helped for insomnia but also RLS.  She has been trying to get off of the Ambien.  She states that restless legs keeps her awake at night.  She does not have insomnia issues but just restless legs.  Ambien works well and she has never had side effects.  She has been told by other providers that she should stop taking Ambien due to its potential side effects.  She has tried alternatives including gabapentin and Seroquel but had side effects.  She would like to try something else for restless leg syndrome and if this does not work or she has side effects and she would like to return back to taking Ambien.  She is out of Ambien at this time.  She had lab workup completed last August.  Iron levels were normal however she did not have ferritin level checked.  Other lab workup was within range.             Pt complains of not being able to sleep because of restless leg syndrome. Patient states she is currently taking Ambien but patient states it is not helping her sleep at night.      How many servings of fruits and vegetables do you eat daily?  4 or more  On average, how many sweetened beverages do you drink each day  "(Examples: soda, juice, sweet tea, etc.  Do NOT count diet or artificially sweetened beverages)?   0  How many days per week do you exercise enough to make your heart beat faster? 7  How many minutes a day do you exercise enough to make your heart beat faster? 30 - 60  How many days per week do you miss taking your medication? 0          Objective    /76   Pulse 64   Temp 97.8  F (36.6  C) (Temporal)   Resp 18   Ht 1.67 m (5' 5.75\")   Wt 57.2 kg (126 lb 3.2 oz)   LMP  (LMP Unknown)   SpO2 97%   Breastfeeding No   BMI 20.53 kg/m    Body mass index is 20.53 kg/m .  Physical Exam   Pleasant female no acute distress.  Affect normal.  Alert and oriented.  Well-groomed and well-dressed.  Lung fields clear to auscultation.  Cardiovascular regular.    Labs in [facility] reviewed and discussed            Signed Electronically by: Rayna Correa NP    "

## 2024-05-06 NOTE — LETTER
May 6, 2024      Bea REGAN Mark  [address] [facility] [address]        Dear ,    We are writing to inform you of your test results.    Ferritin level returned normal.  Low ferritin levels could cause symptoms of restless legs which could improve with the addition of iron infusions.  Since your ferritin level is normal the iron infusions would not help.    Resulted Orders   Ferritin   Result Value Ref Range    Ferritin 164 11 - 328 ng/mL       If you have any questions or concerns, please call the clinic at the number listed above.       Sincerely,      Rayna Correa NP

## 2024-05-06 NOTE — NURSING NOTE
"Chief Complaint   Patient presents with    Consult     Restless Leg Syndrome        Initial /76   Pulse 64   Temp 97.8  F (36.6  C) (Temporal)   Resp 18   Ht 1.67 m (5' 5.75\")   Wt 57.2 kg (126 lb 3.2 oz)   LMP  (LMP Unknown)   SpO2 97%   Breastfeeding No   BMI 20.53 kg/m   Estimated body mass index is 20.53 kg/m  as calculated from the following:    Height as of this encounter: 1.67 m (5' 5.75\").    Weight as of this encounter: 57.2 kg (126 lb 3.2 oz).  Medication Review: complete    The next two questions are to help us understand your food security.  If you are feeling you need any assistance in this area, we have resources available to support you today.          5/6/2024   SDOH- Food Insecurity   Within the past 12 months, did you worry that your food would run out before you got money to buy more? N   Within the past 12 months, did the food you bought just not last and you didn t have money to get more? N         Health Care Directive:  Patient does not have a Health Care Directive or Living Will: Discussed advance care planning with patient; however, patient declined at this time.    Karime Armijo LPN      "

## 2024-09-13 ENCOUNTER — OFFICE VISIT (OUTPATIENT)
Dept: FAMILY MEDICINE | Facility: OTHER | Age: 89
End: 2024-09-13
Attending: STUDENT IN AN ORGANIZED HEALTH CARE EDUCATION/TRAINING PROGRAM
Payer: COMMERCIAL

## 2024-09-13 VITALS
RESPIRATION RATE: 18 BRPM | WEIGHT: 118.5 LBS | BODY MASS INDEX: 19.04 KG/M2 | DIASTOLIC BLOOD PRESSURE: 78 MMHG | TEMPERATURE: 97.3 F | HEIGHT: 66 IN | OXYGEN SATURATION: 97 % | HEART RATE: 60 BPM | SYSTOLIC BLOOD PRESSURE: 148 MMHG

## 2024-09-13 DIAGNOSIS — R30.0 DYSURIA: ICD-10-CM

## 2024-09-13 DIAGNOSIS — I10 ESSENTIAL HYPERTENSION: ICD-10-CM

## 2024-09-13 DIAGNOSIS — Z87.39 HISTORY OF OSTEOPENIA: ICD-10-CM

## 2024-09-13 DIAGNOSIS — F51.01 PRIMARY INSOMNIA: ICD-10-CM

## 2024-09-13 DIAGNOSIS — E78.5 HYPERLIPIDEMIA, UNSPECIFIED HYPERLIPIDEMIA TYPE: ICD-10-CM

## 2024-09-13 DIAGNOSIS — Z78.0 ASYMPTOMATIC MENOPAUSAL STATE: ICD-10-CM

## 2024-09-13 DIAGNOSIS — E55.9 VITAMIN D DEFICIENCY: ICD-10-CM

## 2024-09-13 DIAGNOSIS — Z00.00 ENCOUNTER FOR MEDICARE ANNUAL WELLNESS EXAM: Primary | ICD-10-CM

## 2024-09-13 DIAGNOSIS — E03.9 HYPOTHYROIDISM, UNSPECIFIED TYPE: ICD-10-CM

## 2024-09-13 DIAGNOSIS — F41.9 ANXIETY: ICD-10-CM

## 2024-09-13 DIAGNOSIS — Z12.31 ENCOUNTER FOR SCREENING MAMMOGRAM FOR BREAST CANCER: ICD-10-CM

## 2024-09-13 LAB
ALBUMIN UR-MCNC: NEGATIVE MG/DL
ANION GAP SERPL CALCULATED.3IONS-SCNC: 8 MMOL/L (ref 7–15)
APPEARANCE UR: CLEAR
BASOPHILS # BLD AUTO: 0 10E3/UL (ref 0–0.2)
BASOPHILS NFR BLD AUTO: 0 %
BILIRUB UR QL STRIP: NEGATIVE
BUN SERPL-MCNC: 18 MG/DL (ref 8–23)
CALCIUM SERPL-MCNC: 9.7 MG/DL (ref 8.8–10.4)
CHLORIDE SERPL-SCNC: 97 MMOL/L (ref 98–107)
CHOLEST SERPL-MCNC: 133 MG/DL
COLOR UR AUTO: YELLOW
CREAT SERPL-MCNC: 0.8 MG/DL (ref 0.51–0.95)
EGFRCR SERPLBLD CKD-EPI 2021: 71 ML/MIN/1.73M2
EOSINOPHIL # BLD AUTO: 0.1 10E3/UL (ref 0–0.7)
EOSINOPHIL NFR BLD AUTO: 1 %
ERYTHROCYTE [DISTWIDTH] IN BLOOD BY AUTOMATED COUNT: 13.7 % (ref 10–15)
FASTING STATUS PATIENT QL REPORTED: ABNORMAL
FASTING STATUS PATIENT QL REPORTED: ABNORMAL
GLUCOSE SERPL-MCNC: 97 MG/DL (ref 70–99)
GLUCOSE UR STRIP-MCNC: NEGATIVE MG/DL
HCO3 SERPL-SCNC: 32 MMOL/L (ref 22–29)
HCT VFR BLD AUTO: 41.6 % (ref 35–47)
HDLC SERPL-MCNC: 48 MG/DL
HGB BLD-MCNC: 13.6 G/DL (ref 11.7–15.7)
HGB UR QL STRIP: ABNORMAL
IMM GRANULOCYTES # BLD: 0 10E3/UL
IMM GRANULOCYTES NFR BLD: 0 %
KETONES UR STRIP-MCNC: NEGATIVE MG/DL
LDLC SERPL CALC-MCNC: 65 MG/DL
LEUKOCYTE ESTERASE UR QL STRIP: ABNORMAL
LYMPHOCYTES # BLD AUTO: 1.7 10E3/UL (ref 0.8–5.3)
LYMPHOCYTES NFR BLD AUTO: 27 %
MCH RBC QN AUTO: 32.2 PG (ref 26.5–33)
MCHC RBC AUTO-ENTMCNC: 32.7 G/DL (ref 31.5–36.5)
MCV RBC AUTO: 99 FL (ref 78–100)
MONOCYTES # BLD AUTO: 0.5 10E3/UL (ref 0–1.3)
MONOCYTES NFR BLD AUTO: 8 %
MUCOUS THREADS #/AREA URNS LPF: PRESENT /LPF
NEUTROPHILS # BLD AUTO: 4.2 10E3/UL (ref 1.6–8.3)
NEUTROPHILS NFR BLD AUTO: 64 %
NITRATE UR QL: NEGATIVE
NONHDLC SERPL-MCNC: 85 MG/DL
NRBC # BLD AUTO: 0 10E3/UL
NRBC BLD AUTO-RTO: 0 /100
PH UR STRIP: 7 [PH] (ref 5–9)
PLATELET # BLD AUTO: 203 10E3/UL (ref 150–450)
POTASSIUM SERPL-SCNC: 4.1 MMOL/L (ref 3.4–5.3)
RBC # BLD AUTO: 4.22 10E6/UL (ref 3.8–5.2)
RBC URINE: 1 /HPF
SODIUM SERPL-SCNC: 137 MMOL/L (ref 135–145)
SP GR UR STRIP: 1.01 (ref 1–1.03)
TRIGL SERPL-MCNC: 98 MG/DL
TSH SERPL DL<=0.005 MIU/L-ACNC: 1.18 UIU/ML (ref 0.3–4.2)
UROBILINOGEN UR STRIP-MCNC: NORMAL MG/DL
VIT D+METAB SERPL-MCNC: 32 NG/ML (ref 20–50)
WBC # BLD AUTO: 6.5 10E3/UL (ref 4–11)
WBC URINE: 2 /HPF

## 2024-09-13 PROCEDURE — 99214 OFFICE O/P EST MOD 30 MIN: CPT | Mod: 25 | Performed by: STUDENT IN AN ORGANIZED HEALTH CARE EDUCATION/TRAINING PROGRAM

## 2024-09-13 PROCEDURE — 84443 ASSAY THYROID STIM HORMONE: CPT | Mod: ZL | Performed by: STUDENT IN AN ORGANIZED HEALTH CARE EDUCATION/TRAINING PROGRAM

## 2024-09-13 PROCEDURE — 82306 VITAMIN D 25 HYDROXY: CPT | Mod: ZL | Performed by: STUDENT IN AN ORGANIZED HEALTH CARE EDUCATION/TRAINING PROGRAM

## 2024-09-13 PROCEDURE — 83718 ASSAY OF LIPOPROTEIN: CPT | Mod: ZL | Performed by: STUDENT IN AN ORGANIZED HEALTH CARE EDUCATION/TRAINING PROGRAM

## 2024-09-13 PROCEDURE — G0439 PPPS, SUBSEQ VISIT: HCPCS | Performed by: STUDENT IN AN ORGANIZED HEALTH CARE EDUCATION/TRAINING PROGRAM

## 2024-09-13 PROCEDURE — 85025 COMPLETE CBC W/AUTO DIFF WBC: CPT | Mod: ZL | Performed by: STUDENT IN AN ORGANIZED HEALTH CARE EDUCATION/TRAINING PROGRAM

## 2024-09-13 PROCEDURE — 82465 ASSAY BLD/SERUM CHOLESTEROL: CPT | Mod: ZL | Performed by: STUDENT IN AN ORGANIZED HEALTH CARE EDUCATION/TRAINING PROGRAM

## 2024-09-13 PROCEDURE — G0463 HOSPITAL OUTPT CLINIC VISIT: HCPCS

## 2024-09-13 PROCEDURE — 82374 ASSAY BLOOD CARBON DIOXIDE: CPT | Mod: ZL | Performed by: STUDENT IN AN ORGANIZED HEALTH CARE EDUCATION/TRAINING PROGRAM

## 2024-09-13 PROCEDURE — 36415 COLL VENOUS BLD VENIPUNCTURE: CPT | Mod: ZL | Performed by: STUDENT IN AN ORGANIZED HEALTH CARE EDUCATION/TRAINING PROGRAM

## 2024-09-13 PROCEDURE — 81003 URINALYSIS AUTO W/O SCOPE: CPT | Mod: ZL | Performed by: STUDENT IN AN ORGANIZED HEALTH CARE EDUCATION/TRAINING PROGRAM

## 2024-09-13 RX ORDER — ATENOLOL 100 MG/1
100 TABLET ORAL DAILY
Qty: 90 TABLET | Refills: 3 | Status: SHIPPED | OUTPATIENT
Start: 2024-09-13

## 2024-09-13 RX ORDER — CITALOPRAM HYDROBROMIDE 20 MG/1
20 TABLET ORAL DAILY
Qty: 90 TABLET | Refills: 4 | Status: SHIPPED | OUTPATIENT
Start: 2024-09-13

## 2024-09-13 RX ORDER — AMLODIPINE BESYLATE 5 MG/1
5 TABLET ORAL DAILY
Qty: 90 TABLET | Refills: 4 | Status: SHIPPED | OUTPATIENT
Start: 2024-09-13 | End: 2024-10-04

## 2024-09-13 RX ORDER — ATORVASTATIN CALCIUM 20 MG/1
20 TABLET, FILM COATED ORAL DAILY
Qty: 90 TABLET | Refills: 4 | Status: SHIPPED | OUTPATIENT
Start: 2024-09-13

## 2024-09-13 RX ORDER — HYDROCHLOROTHIAZIDE 25 MG/1
25 TABLET ORAL DAILY
Qty: 90 TABLET | Refills: 4 | Status: SHIPPED | OUTPATIENT
Start: 2024-09-13

## 2024-09-13 RX ORDER — ZOLPIDEM TARTRATE 5 MG/1
5 TABLET ORAL
Qty: 30 TABLET | Refills: 3 | Status: SHIPPED | OUTPATIENT
Start: 2024-09-13

## 2024-09-13 RX ORDER — LEVOTHYROXINE SODIUM 100 UG/1
100 TABLET ORAL DAILY
Qty: 90 TABLET | Refills: 4 | Status: SHIPPED | OUTPATIENT
Start: 2024-09-13

## 2024-09-13 ASSESSMENT — PAIN SCALES - GENERAL: PAINLEVEL: MILD PAIN (3)

## 2024-09-13 NOTE — LETTER
September 16, 2024      Bea Flores  [id number] [facility]  [place] [address]        Dear ,    We are writing to inform you of your test results.    Your test results fall within the expected range(s) or remain unchanged from previous results.  Please continue with current treatment plan. No bladder infection. Med refills all sent.    Resulted Orders   UA Macroscopic with reflex to Microscopic and Culture   Result Value Ref Range    Color Urine Yellow Colorless, Straw, Light Yellow, Yellow    Appearance Urine Clear Clear    Glucose Urine Negative Negative mg/dL    Bilirubin Urine Negative Negative    Ketones Urine Negative Negative mg/dL    Specific Gravity Urine 1.011 1.000 - 1.030    Blood Urine Small (A) Negative    pH Urine 7.0 5.0 - 9.0    Protein Albumin Urine Negative Negative mg/dL    Urobilinogen Urine Normal Normal, 2.0 mg/dL    Nitrite Urine Negative Negative    Leukocyte Esterase Urine Small (A) Negative    Mucus Urine Present (A) None Seen /LPF    RBC Urine 1 <=2 /HPF    WBC Urine 2 <=5 /HPF    Narrative    Urine Culture not indicated   Lipid Panel   Result Value Ref Range    Cholesterol 133 <200 mg/dL    Triglycerides 98 <150 mg/dL    Direct Measure HDL 48 (L) >=50 mg/dL    LDL Cholesterol Calculated 65 <100 mg/dL    Non HDL Cholesterol 85 <130 mg/dL    Patient Fasting > 8hrs? Unknown     Narrative    Cholesterol  Desirable: < 200 mg/dL  Borderline High: 200 - 239 mg/dL  High: >= 240 mg/dL    Triglycerides  Normal: < 150 mg/dL  Borderline High: 150 - 199 mg/dL  High: 200-499 mg/dL  Very High: >= 500 mg/dL    Direct Measure HDL  Female: >= 50 mg/dL   Male: >= 40 mg/dL    LDL Cholesterol  Desirable: < 100 mg/dL  Above Desirable: 100 - 129 mg/dL   Borderline High: 130 - 159 mg/dL   High:  160 - 189 mg/dL   Very High: >= 190 mg/dL    Non HDL Cholesterol  Desirable: < 130 mg/dL  Above Desirable: 130 - 159 mg/dL  Borderline High: 160 - 189 mg/dL  High: 190 - 219 mg/dL  Very High: >= 220 mg/dL    Basic Metabolic Panel   Result Value Ref Range    Sodium 137 135 - 145 mmol/L    Potassium 4.1 3.4 - 5.3 mmol/L    Chloride 97 (L) 98 - 107 mmol/L    Carbon Dioxide (CO2) 32 (H) 22 - 29 mmol/L    Anion Gap 8 7 - 15 mmol/L    Urea Nitrogen 18.0 8.0 - 23.0 mg/dL    Creatinine 0.80 0.51 - 0.95 mg/dL    GFR Estimate 71 >60 mL/min/1.73m2      Comment:      eGFR calculated using 2021 CKD-EPI equation.    Calcium 9.7 8.8 - 10.4 mg/dL      Comment:      Reference intervals for this test were updated on 7/16/2024 to reflect our healthy population more accurately. There may be differences in the flagging of prior results with similar values performed with this method. Those prior results can be interpreted in the context of the updated reference intervals.    Glucose 97 70 - 99 mg/dL    Patient Fasting > 8hrs? Unknown    Vitamin D Total   Result Value Ref Range    Vitamin D, Total (25-Hydroxy) 32 20 - 50 ng/mL      Comment:      optimum levels    Narrative    Season, race, dietary intake, and treatment affect the concentration of 25-hydroxy-Vitamin D. Values may decrease during winter months and increase during summer months.    Vitamin D determination is routinely performed by an immunoassay specific for 25 hydroxyvitamin D3.  If an individual is on vitamin D2(ergocalciferol) supplementation, please specify 25 OH vitamin D2 and D3 level determination by LCMSMS test VITD23.     TSH Reflex GH   Result Value Ref Range    TSH 1.18 0.30 - 4.20 uIU/mL   CBC with platelets and differential   Result Value Ref Range    WBC Count 6.5 4.0 - 11.0 10e3/uL    RBC Count 4.22 3.80 - 5.20 10e6/uL    Hemoglobin 13.6 11.7 - 15.7 g/dL    Hematocrit 41.6 35.0 - 47.0 %    MCV 99 78 - 100 fL    MCH 32.2 26.5 - 33.0 pg    MCHC 32.7 31.5 - 36.5 g/dL    RDW 13.7 10.0 - 15.0 %    Platelet Count 203 150 - 450 10e3/uL    % Neutrophils 64 %    % Lymphocytes 27 %    % Monocytes 8 %    % Eosinophils 1 %    % Basophils 0 %    % Immature Granulocytes 0 %     NRBCs per 100 WBC 0 <1 /100    Absolute Neutrophils 4.2 1.6 - 8.3 10e3/uL    Absolute Lymphocytes 1.7 0.8 - 5.3 10e3/uL    Absolute Monocytes 0.5 0.0 - 1.3 10e3/uL    Absolute Eosinophils 0.1 0.0 - 0.7 10e3/uL    Absolute Basophils 0.0 0.0 - 0.2 10e3/uL    Absolute Immature Granulocytes 0.0 <=0.4 10e3/uL    Absolute NRBCs 0.0 10e3/uL       If you have any questions or concerns, please call the clinic at the number listed above.       Sincerely,      Sesar Luna MD

## 2024-09-13 NOTE — PROGRESS NOTES
Preventive Care Visit  [facility]  Sesar Luna MD, Family Medicine  Sep 13, 2024      Assessment & Plan     Encounter for Medicare annual wellness exam  - CBC and Differential; Future  - Basic Metabolic Panel; Future  - Lipid Panel; Future  - Lipid Panel  - Basic Metabolic Panel  - CBC and Differential    Vitamin D deficiency  Due for check, previous low levels   - Vitamin D Total; Future  - Vitamin D Total    Hypothyroidism, unspecified type  Chronic stable, normal TSH, refilled   - TSH Reflex GH; Future  - TSH Reflex GH  - levothyroxine (SYNTHROID/LEVOTHROID) 100 MCG tablet; Take 1 tablet (100 mcg) by mouth daily.    Essential hypertension  Elevated here, has reported normal BP at home. Will get a smaller cuff ordered for her. If remains elevated at home then consider chronic med adjustment,.   - atenolol (TENORMIN) 100 MG tablet; Take 1 tablet (100 mg) by mouth daily.  - amLODIPine (NORVASC) 5 MG tablet; Take 1 tablet (5 mg) by mouth daily.  - hydrochlorothiazide (HYDRODIURIL) 25 MG tablet; Take 1 tablet (25 mg) by mouth daily.  - Home Blood Pressure Monitor Order    Hyperlipidemia, unspecified hyperlipidemia type  Labs. Refilled   - atorvastatin (LIPITOR) 20 MG tablet; Take 1 tablet (20 mg) by mouth daily.    Anxiety  Chronic stable refilled.   - citalopram (CELEXA) 20 MG tablet; Take 1 tablet (20 mg) by mouth daily.    Primary insomnia  Chronic. Is aware of risks and side effects of ambien given her age. Refilled.   - zolpidem (AMBIEN) 5 MG tablet; Take 1 tablet (5 mg) by mouth nightly as needed for sleep.    Encounter for screening mammogram for breast cancer  Would like to continue screening   - MA Screen Bilateral w/[place]; Future    Dysuria  Intermittent burning at times. Does not appear infected   - UA Macroscopic with reflex to Microscopic and Culture; Future  - UA Macroscopic with reflex to Microscopic and Culture    History of osteopenia  - DX Bone Density;  Future    Asymptomatic menopausal state  - DX Bone Density; Future            Counseling  Appropriate preventive services were addressed with this patient via screening, questionnaire, or discussion as appropriate for fall prevention, nutrition, physical activity, Tobacco-use cessation, social engagement, weight loss and cognition.  Checklist reviewing preventive services available has been given to the patient.  Reviewed patient's diet, addressing concerns and/or questions.   She is at risk for lack of exercise and has been provided with information to increase physical activity for the benefit of her well-being.   The patient was instructed to see the dentist every 6 months.   Discussed possible causes of fatigue. Updated plan of care.  Patient reported difficulty with activities of daily living were addressed today.The patient was provided with written information regarding signs of hearing loss.   Information on urinary incontinence and treatment options given to patient.           No follow-ups on file.    Raymundo Figueredo is a 88 year old, presenting for the following:  Establish Care (With Sesar Porras MD/) and Medicare Visit (Annual wellness exam)        5/6/2024    10:31 AM   Additional Questions   Roomed by Earl Armijo LPN         Health Care Directive  Patient does not have a Health Care Directive or Living Will: Discussed advance care planning with patient; however, patient declined at this time.    HPI    Three months, every other day has diarrhea. No pain. Loose stools.   Initially thought it was food  Started pro biotics. The last 2-3 days improved since.               9/13/2024   General Health   How would you rate your overall physical health? Good            9/13/2024   Nutrition   Diet: Low fat/cholesterol            9/13/2024   Exercise   Days per week of moderate/strenous exercise 2 days      (!) EXERCISE CONCERN      9/13/2024   Social Factors   Frequency of gathering with friends  or relatives More than three times a week   Worry food won't last until get money to buy more No   Food not last or not have enough money for food? No   Do you have housing? (Housing is defined as stable permanent housing and does not include staying ouside in a car, in a tent, in an abandoned building, in an overnight shelter, or couch-surfing.) Yes   Are you worried about losing your housing? No   Lack of transportation? No   Unable to get utilities (heat,electricity)? No            9/13/2024   Fall Risk   Fallen 2 or more times in the past year? Yes    Yes   Trouble with walking or balance? Yes    Yes       Multiple values from one day are sorted in reverse-chronological order           9/13/2024   Activities of Daily Living- Home Safety   Needs help with the following daily activites Telephone use   Safety concerns in the home None of the above            9/13/2024   Dental   Dentist two times every year? (!) NO            9/13/2024   Hearing Screening   Hearing concerns? (!) I FEEL THAT PEOPLE ARE MUMBLING OR NOT SPEAKING CLEARLY.    (!) I NEED TO ASK PEOPLE TO SPEAK UP OR REPEAT THEMSELVES.    (!) IT'S HARDER TO UNDERSTAND WOMEN'S VOICES THAN MEN'S VOICES.    (!) IT'S HARD TO FOLLOW A CONVERSATION IN A NOISY RESTAURANT OR CROWDED ROOM.    (!) TROUBLE FOLLOWING DIALOGUE IN THE THEATHER.    (!) TROUBLE UNDERSTANDING SOFT OR WHISPERED SPEECH.    (!) TROUBLE UNDERSTANDING SPEECH ON THE TELEPHONE       Multiple values from one day are sorted in reverse-chronological order         9/13/2024   Driving Risk Screening   Patient/family members have concerns about driving No            9/13/2024   General Alertness/Fatigue Screening   Have you been more tired than usual lately? (!) YES            9/13/2024   Urinary Incontinence Screening   Bothered by leaking urine in past 6 months Yes            9/13/2024   TB Screening   Were you born outside of the [place]? No            Today's PHQ-2 Score:       9/13/2024     9:30 AM    PHQ-2 (  [employer])   Q1: Little interest or pleasure in doing things 0   Q2: Feeling down, depressed or hopeless 0   PHQ-2 Score 0   Q1: Little interest or pleasure in doing things Not at all   Q2: Feeling down, depressed or hopeless Not at all   PHQ-2 Score 0           2024   Substance Use   Alcohol more than 3/day or more than 7/wk No   Do you have a current opioid prescription? No   How severe/bad is pain from 1 to 10? 2/10   Do you use any other substances recreationally? No        Social History     Tobacco Use    Smoking status: Former     Current packs/day: 0.00     Types: Cigarettes     Quit date: 3/12/1990     Years since quittin.5    Smokeless tobacco: Never   Vaping Use    Vaping status: Never Used   Substance Use Topics    Alcohol use: Yes     Comment: occ wine    Drug use: No           2022   LAST FHS-7 RESULTS   1st degree relative breast or ovarian cancer Yes   Any relative bilateral breast cancer No   Any male have breast cancer No   Any ONE woman have BOTH breast AND ovarian cancer No   Any woman with breast cancer before 50yrs No   2 or more relatives with breast AND/OR ovarian cancer No   2 or more relatives with breast AND/OR bowel cancer No           Mammogram Screening - After age 74- determine frequency with patient based on health status, life expectancy and patient goals          Reviewed and updated as needed this visit by Provider                      Current providers sharing in care for this patient include:  Patient Care Team:  Sesar Stauffer MD as PCP - General (Family Medicine)  Matt Vásquez MD as Assigned PCP  Bentley Abdi MD as Assigned Surgical Provider    The following health maintenance items are reviewed in Epic and correct as of today:  Health Maintenance   Topic Date Due    RSV VACCINE (1 - 1-dose 75+ series) Never done    INFLUENZA VACCINE (1) 2024    COVID-19 Vaccine (2023-24 season) 2024    MEDICARE ANNUAL WELLNESS VISIT   "09/13/2025    LIPID  09/13/2025    TSH W/FREE T4 REFLEX  09/13/2025    FALL RISK ASSESSMENT  09/13/2025    DEXA  03/16/2027    ADVANCE CARE PLANNING  09/13/2029    DTAP/TDAP/TD IMMUNIZATION (3 - Td or Tdap) 11/09/2033    PHQ-2 (once per calendar year)  Completed    ZOSTER IMMUNIZATION  Addressed    HPV IMMUNIZATION  Aged Out    MENINGITIS IMMUNIZATION  Aged Out    RSV MONOCLONAL ANTIBODY  Aged Out    MAMMO SCREENING  Discontinued    Pneumococcal Vaccine: 65+ Years  Discontinued         Review of Systems  Constitutional, HEENT, cardiovascular, pulmonary, gi and gu systems are negative, except as otherwise noted.     Objective    Exam  BP (!) 148/78   Pulse 60   Temp 97.3  F (36.3  C) (Tympanic)   Resp 18   Ht 1.676 m (5' 6\")   Wt 53.8 kg (118 lb 8 oz)   LMP  (LMP Unknown)   SpO2 97%   BMI 19.13 kg/m     Estimated body mass index is 19.13 kg/m  as calculated from the following:    Height as of this encounter: 1.676 m (5' 6\").    Weight as of this encounter: 53.8 kg (118 lb 8 oz).    Physical Exam  GENERAL: alert and no distress  EYES: Eyes grossly normal to inspection, PERRL and conjunctivae and sclerae normal  HENT: ear canals and TM's normal, nose and mouth without ulcers or lesions  RESP: lungs clear to auscultation - no rales, rhonchi or wheezes  CV: regular rate and rhythm, normal S1 S2, no S3 or S4, no murmur, click or rub, no peripheral edema   ABDOMEN: soft, nontender, no hepatosplenomegaly, no masses and bowel sounds normal  PSYCH: mentation appears normal, affect normal/bright        9/13/2024   Mini Cog   Clock Draw Score 2 Normal   3 Item Recall 1 object recalled   Mini Cog Total Score 3                 Signed Electronically by: Sesar Luna MD    DME (Durable Medical Equipment) Orders and Documentation  Orders Placed This Encounter   Procedures    Home Blood Pressure Monitor Order        The patient was assessed and it was determined the patient is in need of the following listed DME " Supplies/Equipment. Please complete supporting documentation below to demonstrate medical necessity.

## 2024-09-13 NOTE — PATIENT INSTRUCTIONS
Patient Education   Preventive Care Advice   This is general advice given by our system to help you stay healthy. However, your care team may have specific advice just for you. Please talk to your care team about your preventive care needs.  Nutrition  Eat 5 or more servings of fruits and vegetables each day.  Try wheat bread, brown rice and whole grain pasta (instead of white bread, rice, and pasta).  Get enough calcium and vitamin D. Check the label on foods and aim for 100% of the RDA (recommended daily allowance).  Lifestyle  Exercise at least 150 minutes each week  (30 minutes a day, 5 days a week).  Do muscle strengthening activities 2 days a week. These help control your weight and prevent disease.  No smoking.  Wear sunscreen to prevent skin cancer.  Have a dental exam and cleaning every 6 months.  Yearly exams  See your health care team every year to talk about:  Any changes in your health.  Any medicines your care team has prescribed.  Preventive care, family planning, and ways to prevent chronic diseases.  Shots (vaccines)   HPV shots (up to age 26), if you've never had them before.  Hepatitis B shots (up to age 59), if you've never had them before.  COVID-19 shot: Get this shot when it's due.  Flu shot: Get a flu shot every year.  Tetanus shot: Get a tetanus shot every 10 years.  Pneumococcal, hepatitis A, and RSV shots: Ask your care team if you need these based on your risk.  Shingles shot (for age 50 and up)  General health tests  Diabetes screening:  Starting at age 35, Get screened for diabetes at least every 3 years.  If you are younger than age 35, ask your care team if you should be screened for diabetes.  Cholesterol test: At age 39, start having a cholesterol test every 5 years, or more often if advised.  Bone density scan (DEXA): At age 50, ask your care team if you should have this scan for osteoporosis (brittle bones).  Hepatitis C: Get tested at least once in your life.  STIs (sexually  transmitted infections)  Before age 24: Ask your care team if you should be screened for STIs.  After age 24: Get screened for STIs if you're at risk. You are at risk for STIs (including HIV) if:  You are sexually active with more than one person.  You don't use condoms every time.  You or a partner was diagnosed with a sexually transmitted infection.  If you are at risk for HIV, ask about PrEP medicine to prevent HIV.  Get tested for HIV at least once in your life, whether you are at risk for HIV or not.  Cancer screening tests  Cervical cancer screening: If you have a cervix, begin getting regular cervical cancer screening tests starting at age 21.  Breast cancer scan (mammogram): If you've ever had breasts, begin having regular mammograms starting at age 40. This is a scan to check for breast cancer.  Colon cancer screening: It is important to start screening for colon cancer at age 45.  Have a colonoscopy test every 10 years (or more often if you're at risk) Or, ask your provider about stool tests like a FIT test every year or Cologuard test every 3 years.  To learn more about your testing options, visit:   .  For help making a decision, visit:   https://bit.ly/fm71430.  Prostate cancer screening test: If you have a prostate, ask your care team if a prostate cancer screening test (PSA) at age 55 is right for you.  Lung cancer screening: If you are a current or former smoker ages 50 to 80, ask your care team if ongoing lung cancer screenings are right for you.  For informational purposes only. Not to replace the advice of your health care provider. Copyright   2023 [facility]. All rights reserved. Clinically reviewed by the [facility]. [employer] 322694 - REV 01/24.  Learning About Activities of Daily Living  What are activities of daily living?     Activities of daily living (ADLs) are the basic self-care tasks you do every day. These include eating, bathing, dressing,  and moving around.  As you age, and if you have health problems, you may find that it's harder to do some of these tasks. If so, your doctor can suggest ideas that may help.  To measure what kind of help you may need, your doctor will ask how well you are able to do ADLs. Let your doctor know if there are any tasks that you are having trouble doing. This is an important first step to getting help. And when you have the help you need, you can stay as independent as possible.  How will a doctor assess your ADLs?  Asking about ADLs is part of a routine health checkup your doctor will likely do as you age. Your health check might be done in a doctor's office, in your home, or at a hospital. The goal is to find out if you are having any problems that could make it hard to care for yourself or that make it unsafe for you to be on your own.  To measure your ADLs, your doctor will ask how hard it is for you to do routine tasks. Your doctor may also want to know if you have changed the way you do a task because of a health problem. Your doctor may watch how you:  Walk back and forth.  Keep your balance while you stand or walk.  Move from sitting to standing or from a bed to a chair.  Button or unbutton a shirt or sweater.  Remove and put on your shoes.  It's common to feel a little worried or anxious if you find you can't do all the things you used to be able to do. Talking with your doctor about ADLs is a way to make sure you're as safe as possible and able to care for yourself as well as you can. You may want to bring a caregiver, friend, or family member to your checkup. They can help you talk to your doctor.  Follow-up care is a key part of your treatment and safety. Be sure to make and go to all appointments, and call your doctor if you are having problems. It's also a good idea to know your test results and keep a list of the medicines you take.  Current as of: October 24, 2023  Content Version: 14.1    0944-1671  Healthwise, [employer].   Care instructions adapted under license by your healthcare professional. If you have questions about a medical condition or this instruction, always ask your healthcare professional. [employer] disclaims any warranty or liability for your use of this information.    Preventing Falls: Care Instructions  Injuries and health problems such as trouble walking or poor eyesight can increase your risk of falling. So can some medicines. But there are things you can do to help prevent falls. You can exercise to get stronger. You can also arrange your home to make it safer.    Talk to your doctor about the medicines you take. Ask if any of them increase the risk of falls and whether they can be changed or stopped.   Try to exercise regularly. It can help improve your strength and balance. This can help lower your risk of falling.     Practice fall safety and prevention.    Wear low-heeled shoes that fit well and give your feet good support. Talk to your doctor if you have foot problems that make this hard.  Carry a cellphone or wear a medical alert device that you can use to call for help.  Use stepladders instead of chairs to reach high objects. Don't climb if you're at risk for falls. Ask for help, if needed.  Wear the correct eyeglasses, if you need them.    Make your home safer.    Remove rugs, cords, clutter, and furniture from walkways.  Keep your house well lit. Use night-lights in hallways and bathrooms.  Install and use sturdy handrails on stairways.  Wear nonskid footwear, even inside. Don't walk barefoot or in socks without shoes.    Be safe outside.    Use handrails, curb cuts, and ramps whenever possible.  Keep your hands free by using a shoulder bag or backpack.  Try to walk in well-lit areas. Watch out for uneven ground, changes in pavement, and debris.  Be careful in the winter. Walk on the grass or gravel when sidewalks are slippery. Use de-icer on steps and walkways.  "Add non-slip devices to shoes.    Put grab bars and nonskid mats in your shower or tub and near the toilet. Try to use a shower chair or bath bench when bathing.   Get into a tub or shower by putting in your weaker leg first. Get out with your strong side first. Have a phone or medical alert device in the bathroom with you.   Where can you learn more?  Go to https://www.BeLocal.Swrve/patiented  Enter G117 in the search box to learn more about \"Preventing Falls: Care Instructions.\"  Current as of: July 17, 2023               Content Version: 14.0    1049-6856 [employer].   Care instructions adapted under license by your healthcare professional. If you have questions about a medical condition or this instruction, always ask your healthcare professional. [employer] disclaims any warranty or liability for your use of this information.      Hearing Loss: Care Instructions  Overview     Hearing loss is a sudden or slow decrease in how well you hear. It can range from slight to profound. Permanent hearing loss can occur with aging. It also can happen when you are exposed long-term to loud noise. Examples include listening to loud music, riding motorcycles, or being around other loud machines.  Hearing loss can affect your work and home life. It can make you feel lonely or depressed. You may feel that you have lost your independence. But hearing aids and other devices can help you hear better and feel connected to others.  Follow-up care is a key part of your treatment and safety. Be sure to make and go to all appointments, and call your doctor if you are having problems. It's also a good idea to know your test results and keep a list of the medicines you take.  How can you care for yourself at home?  Avoid loud noises whenever possible. This helps keep your hearing from getting worse.  Always wear hearing protection around loud noises.  Wear a hearing aid as directed.  A professional can help " "you pick a hearing aid that will work best for you.  You can also get hearing aids over the counter for mild to moderate hearing loss.  Have hearing tests as your doctor suggests. They can show whether your hearing has changed. Your hearing aid may need to be adjusted.  Use other devices as needed. These may include:  Telephone amplifiers and hearing aids that can connect to a television, stereo, radio, or microphone.  Devices that use lights or vibrations. These alert you to the doorbell, a ringing telephone, or a baby monitor.  Television closed-captioning. This shows the words at the bottom of the screen. Most new TVs can do this.  TTY (text telephone). This lets you type messages back and forth on the telephone instead of talking or listening. These devices are also called TDD. When messages are typed on the keyboard, they are sent over the phone line to a receiving TTY. The message is shown on a monitor.  Use text messaging, social media, and email if it is hard for you to communicate by telephone.  Try to learn a listening technique called speechreading. It is not lipreading. You pay attention to people's gestures, expressions, posture, and tone of voice. These clues can help you understand what a person is saying. Face the person you are talking to, and have them face you. Make sure the lighting is good. You need to see the other person's face clearly.  Think about counseling if you need help to adjust to your hearing loss.  When should you call for help?  Watch closely for changes in your health, and be sure to contact your doctor if:    You think your hearing is getting worse.     You have new symptoms, such as dizziness or nausea.   Where can you learn more?  Go to https://www.Informed Trades.net/patiented  Enter R798 in the search box to learn more about \"Hearing Loss: Care Instructions.\"  Current as of: September 27, 2023               Content Version: 14.0    2255-6527 Healthwise, [employer].   Care " instructions adapted under license by your healthcare professional. If you have questions about a medical condition or this instruction, always ask your healthcare professional. Healthwise, [facility] disclaims any warranty or liability for your use of this information.      Learning About Sleeping Well  What does sleeping well mean?     Sleeping well means getting enough sleep to feel good and stay healthy. How much sleep is enough varies among people.  The number of hours you sleep and how you feel when you wake up are both important. If you do not feel refreshed, you probably need more sleep. Another sign of not getting enough sleep is feeling tired during the day.  Experts recommend that adults get at least 7 or more hours of sleep per day. Children and older adults need more sleep.  Why is getting enough sleep important?  Getting enough quality sleep is a basic part of good health. When your sleep suffers, your physical health, mood, and your thoughts can suffer too. You may find yourself feeling more grumpy or stressed. Not getting enough sleep also can lead to serious problems, including injury, accidents, anxiety, and depression.  What might cause poor sleeping?  Many things can cause sleep problems, including:  Changes to your sleep schedule.  Stress. Stress can be caused by fear about a single event, such as giving a speech. Or you may have ongoing stress, such as worry about work or school.  Depression, anxiety, and other mental or emotional conditions.  Changes in your sleep habits or surroundings. This includes changes that happen where you sleep, such as noise, light, or sleeping in a different bed. It also includes changes in your sleep pattern, such as having jet lag or working a late shift.  Health problems, such as pain, breathing problems, and restless legs syndrome.  Lack of regular exercise.  Using alcohol, nicotine, or caffeine before bed.  How can you help yourself?  Here are some tips that  "may help you sleep more soundly and wake up feeling more refreshed.  Your sleeping area   Use your bedroom only for sleeping and sex. A bit of light reading may help you fall asleep. But if it doesn't, do your reading elsewhere in the house. Try not to use your TV, computer, smartphone, or tablet while you are in bed.  Be sure your bed is big enough to stretch out comfortably, especially if you have a sleep partner.  Keep your bedroom quiet, dark, and cool. Use curtains, blinds, or a sleep mask to block out light. To block out noise, use earplugs, soothing music, or a \"white noise\" machine.  Your evening and bedtime routine   Create a relaxing bedtime routine. You might want to take a warm shower or bath, or listen to soothing music.  Go to bed at the same time every night. And get up at the same time every morning, even if you feel tired.  What to avoid   Limit caffeine (coffee, tea, caffeinated sodas) during the day, and don't have any for at least 6 hours before bedtime.  Avoid drinking alcohol before bedtime. Alcohol can cause you to wake up more often during the night.  Try not to smoke or use tobacco, especially in the evening. Nicotine can keep you awake.  Limit naps during the day, especially close to bedtime.  Avoid lying in bed awake for too long. If you can't fall asleep or if you wake up in the middle of the night and can't get back to sleep within about 20 minutes, get out of bed and go to another room until you feel sleepy.  Avoid taking medicine right before bed that may keep you awake or make you feel hyper or energized. Your doctor can tell you if your medicine may do this and if you can take it earlier in the day.  If you can't sleep   Imagine yourself in a peaceful, pleasant scene. Focus on the details and feelings of being in a place that is relaxing.  Get up and do a quiet or boring activity until you feel sleepy.  Avoid drinking any liquids before going to bed to help prevent waking up often to " "use the bathroom.  Where can you learn more?  Go to https://www.Heetch.net/patiented  Enter J942 in the search box to learn more about \"Learning About Sleeping Well.\"  Current as of: July 10, 2023  Content Version: 14.1 2006-2024 [employer].   Care instructions adapted under license by your healthcare professional. If you have questions about a medical condition or this instruction, always ask your healthcare professional. [employer] disclaims any warranty or liability for your use of this information.    Bladder Training: Care Instructions  Your Care Instructions     Bladder training is used to treat urge incontinence and stress incontinence. Urge incontinence means that the need to urinate comes on so fast that you can't get to a toilet in time. Stress incontinence means that you leak urine because of pressure on your bladder. For example, it may happen when you laugh, cough, or lift something heavy.  Bladder training can increase how long you can wait before you have to urinate. It can also help your bladder hold more urine. And it can give you better control over the urge to urinate.  It is important to remember that bladder training takes a few weeks to a few months to make a difference. You may not see results right away, but don't give up.  Follow-up care is a key part of your treatment and safety. Be sure to make and go to all appointments, and call your doctor if you are having problems. It's also a good idea to know your test results and keep a list of the medicines you take.  How can you care for yourself at home?  Work with your doctor to come up with a bladder training program that is right for you. You may use one or more of the following methods.  Delayed urination  In the beginning, try to keep from urinating for 5 minutes after you first feel the need to go.  While you wait, take deep, slow breaths to relax. Kegel exercises can also help you delay the need to go to " "the bathroom.  After some practice, when you can easily wait 5 minutes to urinate, try to wait 10 minutes before you urinate.  Slowly increase the waiting period until you are able to control when you have to urinate.  Scheduled urination  Empty your bladder when you first wake up in the morning.  Schedule times throughout the day when you will urinate.  Start by going to the bathroom every hour, even if you don't need to go.  Slowly increase the time between trips to the bathroom.  When you have found a schedule that works well for you, keep doing it.  If you wake up during the night and have to urinate, do it. Apply your schedule to waking hours only.  Kegel exercises  These tighten and strengthen pelvic muscles, which can help you control the flow of urine. (If doing these exercises causes pain, stop doing them and talk with your doctor.) To do Kegel exercises:  Squeeze your muscles as if you were trying not to pass gas. Or squeeze your muscles as if you were stopping the flow of urine. Your belly, legs, and buttocks shouldn't move.  Hold the squeeze for 3 seconds, then relax for 5 to 10 seconds.  Start with 3 seconds, then add 1 second each week until you are able to squeeze for 10 seconds.  Repeat the exercise 10 times a session. Do 3 to 8 sessions a day.  When should you call for help?  Watch closely for changes in your health, and be sure to contact your doctor if:    Your incontinence is getting worse.     You do not get better as expected.   Where can you learn more?  Go to https://www.cuaQea.net/patiented  Enter V684 in the search box to learn more about \"Bladder Training: Care Instructions.\"  Current as of: November 15, 2023               Content Version: 14.0    2766-7016 Healthwise, [employer].   Care instructions adapted under license by your healthcare professional. If you have questions about a medical condition or this instruction, always ask your healthcare professional. [employer], " [employer] disclaims any warranty or liability for your use of this information.

## 2024-09-13 NOTE — NURSING NOTE
"Chief Complaint   Patient presents with    Establish Care     With Sesar Porras MD      Medicare Visit     Annual wellness exam       Initial BP (!) 158/80 (BP Location: Right arm, Patient Position: Sitting, Cuff Size: Adult Regular)   Pulse 60   Temp 97.3  F (36.3  C) (Tympanic)   Resp 18   Ht 1.676 m (5' 6\")   Wt 53.8 kg (118 lb 8 oz)   LMP  (LMP Unknown)   SpO2 97%   BMI 19.13 kg/m   Estimated body mass index is 19.13 kg/m  as calculated from the following:    Height as of this encounter: 1.676 m (5' 6\").    Weight as of this encounter: 53.8 kg (118 lb 8 oz).  Medication Review: complete    The next two questions are to help us understand your food security.  If you are feeling you need any assistance in this area, we have resources available to support you today.          9/13/2024   SDOH- Food Insecurity   Within the past 12 months, did you worry that your food would run out before you got money to buy more? N   Within the past 12 months, did the food you bought just not last and you didn t have money to get more? N            Health Care Directive:  Patient does not have a Health Care Directive or Living Will: Discussed advance care planning with patient; however, patient declined at this time.    Jenni Ordonez LPN      "

## 2024-10-04 ENCOUNTER — HOSPITAL ENCOUNTER (OUTPATIENT)
Dept: GENERAL RADIOLOGY | Facility: OTHER | Age: 89
Discharge: HOME | End: 2024-10-04
Attending: STUDENT IN AN ORGANIZED HEALTH CARE EDUCATION/TRAINING PROGRAM
Payer: MEDICARE

## 2024-10-04 ENCOUNTER — OFFICE VISIT (OUTPATIENT)
Dept: FAMILY MEDICINE | Facility: OTHER | Age: 89
End: 2024-10-04
Attending: STUDENT IN AN ORGANIZED HEALTH CARE EDUCATION/TRAINING PROGRAM
Payer: MEDICARE

## 2024-10-04 VITALS
TEMPERATURE: 96.9 F | HEIGHT: 66 IN | SYSTOLIC BLOOD PRESSURE: 158 MMHG | DIASTOLIC BLOOD PRESSURE: 66 MMHG | WEIGHT: 117.5 LBS | OXYGEN SATURATION: 91 % | HEART RATE: 58 BPM | RESPIRATION RATE: 18 BRPM | BODY MASS INDEX: 18.88 KG/M2

## 2024-10-04 DIAGNOSIS — K52.9 CHRONIC DIARRHEA: ICD-10-CM

## 2024-10-04 DIAGNOSIS — I10 ESSENTIAL HYPERTENSION: Primary | ICD-10-CM

## 2024-10-04 LAB — CRP SERPL-MCNC: <3 MG/L

## 2024-10-04 PROCEDURE — 86140 C-REACTIVE PROTEIN: CPT | Mod: ZL | Performed by: STUDENT IN AN ORGANIZED HEALTH CARE EDUCATION/TRAINING PROGRAM

## 2024-10-04 PROCEDURE — 99214 OFFICE O/P EST MOD 30 MIN: CPT | Performed by: STUDENT IN AN ORGANIZED HEALTH CARE EDUCATION/TRAINING PROGRAM

## 2024-10-04 PROCEDURE — G0463 HOSPITAL OUTPT CLINIC VISIT: HCPCS

## 2024-10-04 PROCEDURE — G0463 HOSPITAL OUTPT CLINIC VISIT: HCPCS | Mod: 25

## 2024-10-04 PROCEDURE — G2211 COMPLEX E/M VISIT ADD ON: HCPCS | Performed by: STUDENT IN AN ORGANIZED HEALTH CARE EDUCATION/TRAINING PROGRAM

## 2024-10-04 PROCEDURE — 74018 RADEX ABDOMEN 1 VIEW: CPT

## 2024-10-04 PROCEDURE — 86364 TISS TRNSGLTMNASE EA IG CLAS: CPT | Mod: ZL | Performed by: STUDENT IN AN ORGANIZED HEALTH CARE EDUCATION/TRAINING PROGRAM

## 2024-10-04 PROCEDURE — 36415 COLL VENOUS BLD VENIPUNCTURE: CPT | Mod: ZL | Performed by: STUDENT IN AN ORGANIZED HEALTH CARE EDUCATION/TRAINING PROGRAM

## 2024-10-04 RX ORDER — AMLODIPINE BESYLATE 10 MG/1
10 TABLET ORAL DAILY
Qty: 90 TABLET | Refills: 4 | Status: SHIPPED | OUTPATIENT
Start: 2024-10-04 | End: 2024-11-06 | Stop reason: SINTOL

## 2024-10-04 ASSESSMENT — PAIN SCALES - GENERAL: PAINLEVEL: MILD PAIN (2)

## 2024-10-04 NOTE — NURSING NOTE
"Chief Complaint   Patient presents with    Hypertension     Episodes of elevated blood pressure    Follow Up     Ongoing loose stools, gas, bloating, abdominal pain rated 2       Initial BP (!) 158/66 (BP Location: Right arm, Patient Position: Sitting, Cuff Size: Adult Regular)   Pulse 58   Temp 96.9  F (36.1  C) (Tympanic)   Resp 18   Ht 1.676 m (5' 6\")   Wt 53.3 kg (117 lb 8 oz)   LMP  (LMP Unknown)   SpO2 91%   BMI 18.96 kg/m   Estimated body mass index is 18.96 kg/m  as calculated from the following:    Height as of this encounter: 1.676 m (5' 6\").    Weight as of this encounter: 53.3 kg (117 lb 8 oz).  Medication Review: complete    The next two questions are to help us understand your food security.  If you are feeling you need any assistance in this area, we have resources available to support you today.          9/13/2024   SDOH- Food Insecurity   Within the past 12 months, did you worry that your food would run out before you got money to buy more? N   Within the past 12 months, did the food you bought just not last and you didn t have money to get more? N            Health Care Directive:  Patient does not have a Health Care Directive or Living Will: Discussed advance care planning with patient; however, patient declined at this time.    Jenni Ordonez LPN      "

## 2024-10-08 LAB
TTG IGA SER-ACNC: <0.2 U/ML
TTG IGG SER-ACNC: <0.6 U/ML

## 2024-10-14 ENCOUNTER — LAB (OUTPATIENT)
Dept: LAB | Facility: OTHER | Age: 89
End: 2024-10-14
Attending: STUDENT IN AN ORGANIZED HEALTH CARE EDUCATION/TRAINING PROGRAM
Payer: MEDICARE

## 2024-10-14 DIAGNOSIS — K52.9 CHRONIC DIARRHEA: ICD-10-CM

## 2024-10-14 LAB — LACTOFERRIN STL QL IA: NEGATIVE

## 2024-10-14 PROCEDURE — 87507 IADNA-DNA/RNA PROBE TQ 12-25: CPT | Mod: ZL

## 2024-10-14 PROCEDURE — 87209 SMEAR COMPLEX STAIN: CPT | Mod: ZL

## 2024-10-14 PROCEDURE — 83630 LACTOFERRIN FECAL (QUAL): CPT | Mod: ZL

## 2024-10-15 ENCOUNTER — HOSPITAL ENCOUNTER (OUTPATIENT)
Dept: MAMMOGRAPHY | Facility: OTHER | Age: 89
Discharge: HOME | End: 2024-10-15
Attending: STUDENT IN AN ORGANIZED HEALTH CARE EDUCATION/TRAINING PROGRAM
Payer: MEDICARE

## 2024-10-15 ENCOUNTER — HOSPITAL ENCOUNTER (OUTPATIENT)
Dept: BONE DENSITY | Facility: OTHER | Age: 89
Discharge: HOME | End: 2024-10-15
Attending: STUDENT IN AN ORGANIZED HEALTH CARE EDUCATION/TRAINING PROGRAM
Payer: MEDICARE

## 2024-10-15 DIAGNOSIS — Z78.0 ASYMPTOMATIC MENOPAUSAL STATE: ICD-10-CM

## 2024-10-15 DIAGNOSIS — Z12.31 ENCOUNTER FOR SCREENING MAMMOGRAM FOR BREAST CANCER: ICD-10-CM

## 2024-10-15 DIAGNOSIS — Z87.39 HISTORY OF OSTEOPENIA: ICD-10-CM

## 2024-10-15 LAB
ADV 40+41 DNA STL QL NAA+NON-PROBE: NEGATIVE
ASTRO TYP 1-8 RNA STL QL NAA+NON-PROBE: NEGATIVE
C CAYETANENSIS DNA STL QL NAA+NON-PROBE: NEGATIVE
CAMPYLOBACTER DNA SPEC NAA+PROBE: NEGATIVE
CRYPTOSP DNA STL QL NAA+NON-PROBE: NEGATIVE
E COLI O157 DNA STL QL NAA+NON-PROBE: NORMAL
E HISTOLYT DNA STL QL NAA+NON-PROBE: NEGATIVE
EAEC ASTA GENE ISLT QL NAA+PROBE: NEGATIVE
EC STX1+STX2 GENES STL QL NAA+NON-PROBE: NEGATIVE
EPEC EAE GENE STL QL NAA+NON-PROBE: NEGATIVE
ETEC LTA+ST1A+ST1B TOX ST NAA+NON-PROBE: NEGATIVE
G LAMBLIA DNA STL QL NAA+NON-PROBE: NEGATIVE
NOROVIRUS GI+II RNA STL QL NAA+NON-PROBE: NEGATIVE
P SHIGELLOIDES DNA STL QL NAA+NON-PROBE: NEGATIVE
RVA RNA STL QL NAA+NON-PROBE: NEGATIVE
SALMONELLA SP RPOD STL QL NAA+PROBE: NEGATIVE
SAPO I+II+IV+V RNA STL QL NAA+NON-PROBE: NEGATIVE
SHIGELLA SP+EIEC IPAH ST NAA+NON-PROBE: NEGATIVE
V CHOLERAE DNA SPEC QL NAA+PROBE: NEGATIVE
VIBRIO DNA SPEC NAA+PROBE: NEGATIVE
Y ENTEROCOL DNA STL QL NAA+PROBE: NEGATIVE

## 2024-10-15 PROCEDURE — 77063 BREAST TOMOSYNTHESIS BI: CPT

## 2024-10-15 PROCEDURE — 77080 DXA BONE DENSITY AXIAL: CPT

## 2024-10-16 LAB
O+P STL MICRO: NEGATIVE
SPECIMEN TYPE: NORMAL

## 2024-10-16 NOTE — RESULT ENCOUNTER NOTE
Team - please call patient with results.  Stool studies all negative for infectious cause. Next step would be colonoscopy to further assess, is she ok with a referral for this?

## 2024-10-17 ENCOUNTER — TELEPHONE (OUTPATIENT)
Dept: SURGERY | Facility: OTHER | Age: 89
End: 2024-10-17
Payer: COMMERCIAL

## 2024-10-17 NOTE — TELEPHONE ENCOUNTER
[facility] Diagnostic Referral    Patient has a referral for a C-Scope with a diagnosis of Chronic diarrhea.    Please advise.    Thank you,  Samrene Garcia on 10/17/2024 at 1:36 PM

## 2024-11-06 ENCOUNTER — OFFICE VISIT (OUTPATIENT)
Dept: FAMILY MEDICINE | Facility: OTHER | Age: 89
End: 2024-11-06
Attending: STUDENT IN AN ORGANIZED HEALTH CARE EDUCATION/TRAINING PROGRAM
Payer: COMMERCIAL

## 2024-11-06 VITALS
DIASTOLIC BLOOD PRESSURE: 70 MMHG | TEMPERATURE: 96.9 F | RESPIRATION RATE: 18 BRPM | BODY MASS INDEX: 18.96 KG/M2 | OXYGEN SATURATION: 99 % | HEIGHT: 66 IN | WEIGHT: 118 LBS | HEART RATE: 61 BPM | SYSTOLIC BLOOD PRESSURE: 134 MMHG

## 2024-11-06 DIAGNOSIS — I10 ESSENTIAL HYPERTENSION: ICD-10-CM

## 2024-11-06 PROCEDURE — G2211 COMPLEX E/M VISIT ADD ON: HCPCS | Performed by: STUDENT IN AN ORGANIZED HEALTH CARE EDUCATION/TRAINING PROGRAM

## 2024-11-06 PROCEDURE — G0463 HOSPITAL OUTPT CLINIC VISIT: HCPCS

## 2024-11-06 PROCEDURE — 99214 OFFICE O/P EST MOD 30 MIN: CPT | Performed by: STUDENT IN AN ORGANIZED HEALTH CARE EDUCATION/TRAINING PROGRAM

## 2024-11-06 RX ORDER — AMLODIPINE BESYLATE 5 MG/1
7.5 TABLET ORAL DAILY
Qty: 90 TABLET | Refills: 4 | Status: SHIPPED | OUTPATIENT
Start: 2024-11-06

## 2024-11-06 ASSESSMENT — PAIN SCALES - GENERAL: PAINLEVEL_OUTOF10: NO PAIN (0)

## 2024-11-06 NOTE — NURSING NOTE
"Chief Complaint   Patient presents with    Follow Up     hypertension    Edema     Bilateral feet, ankles, lower legs       Initial /70 (BP Location: Right arm, Patient Position: Sitting, Cuff Size: Adult Regular)   Pulse 61   Temp 96.9  F (36.1  C) (Tympanic)   Resp 18   Ht 1.676 m (5' 6\")   Wt 53.5 kg (118 lb)   LMP  (LMP Unknown)   SpO2 99%   BMI 19.05 kg/m   Estimated body mass index is 19.05 kg/m  as calculated from the following:    Height as of this encounter: 1.676 m (5' 6\").    Weight as of this encounter: 53.5 kg (118 lb).  Medication Review: complete    The next two questions are to help us understand your food security.  If you are feeling you need any assistance in this area, we have resources available to support you today.          9/13/2024   SDOH- Food Insecurity   Within the past 12 months, did you worry that your food would run out before you got money to buy more? N    Within the past 12 months, did the food you bought just not last and you didn t have money to get more? N        Patient-reported         Health Care Directive:  Patient does not have a Health Care Directive: Discussed advance care planning with patient; however, patient declined at this time.    Jenni Ordonez LPN      "

## 2024-11-06 NOTE — PROGRESS NOTES
"  Assessment & Plan     Essential hypertension  Could be having side effects from norvasc increase. We discussed switching to a different medication (losartan). She prefers to try norvasc 7.5 mg and see how her swelling is. She will monitor BP at home and reach out if BP consistently >130/80  - amLODIPine (NORVASC) 5 MG tablet; Take 1.5 tablets (7.5 mg) by mouth daily.      Diarrhea seems to have resolved-- plan to hold off on colonoscopy for now which she prefers    The longitudinal plan of care for the diagnosis(es)/condition(s) as documented were addressed during this visit. Due to the added complexity in care, I will continue to support Bea in the subsequent management and with ongoing continuity of care.          No follow-ups on file.    Raymundo Figueredo is a 88 year old, presenting for the following health issues:  Follow Up (hypertension) and Edema (Bilateral feet, ankles, lower legs)    HPI     Blood pressure  - since starting the increased dose of amlodipine noticed increased swelling at night, resolves  - in her ankles  - fine during the day-- wears compression socks  - home BP ranges 110-120/60-70s    Diarrhea  - transitioned more to constipation   - discussed risk of colonoscopy at her age and at this time she declines                Review of Systems  Constitutional, HEENT, cardiovascular, pulmonary, gi and gu systems are negative, except as otherwise noted.      Objective    /70 (BP Location: Right arm, Patient Position: Sitting, Cuff Size: Adult Regular)   Pulse 61   Temp 96.9  F (36.1  C) (Tympanic)   Resp 18   Ht 1.676 m (5' 6\")   Wt 53.5 kg (118 lb)   LMP  (LMP Unknown)   SpO2 99%   BMI 19.05 kg/m    Body mass index is 19.05 kg/m .  Physical Exam   GENERAL: alert and no distress  RESP: lungs clear to auscultation - no rales, rhonchi or wheezes  CV: regular rate and rhythm, normal S1 S2, no S3 or S4, no murmur, click or rub, no peripheral edema   MS: no gross musculoskeletal " defects noted, no edema  SKIN: no suspicious lesions or rashes  PSYCH: mentation appears normal, affect normal/bright            Signed Electronically by: Sesar Luna MD

## 2025-02-21 NOTE — PROGRESS NOTES
"  Assessment & Plan     Essential hypertension  Not well controlled, increasing norvasc from 5 mg to 10 mg. Follow up in 1 month   - amLODIPine (NORVASC) 10 MG tablet; Take 1 tablet (10 mg) by mouth daily.    Chronic diarrhea  Has been on going for 3 months now. No constipation. Had a normal tsh recently. No known IBD. Could be IBS. Will screen for infection. If no infection then will consider referral to colonoscopy   - Tissue transglutaminase Ab IgA and IgG; Future  - CRP inflammation; Future  - Enteric Bacteria and Virus Panel by JORGE Stool; Future  - Ova and Parasite Exam Routine; Future  - XR Abdomen 1 View; Future  - Fecal Lactoferrin; Future  - Tissue transglutaminase Ab IgA and IgG  - CRP inflammation    The longitudinal plan of care for the diagnosis(es)/condition(s) as documented were addressed during this visit. Due to the added complexity in care, I will continue to support Bea in the subsequent management and with ongoing continuity of care.              No follow-ups on file.    Raymundo Figueredo is a 88 year old, presenting for the following health issues:  Hypertension (Episodes of elevated blood pressure) and Follow Up (Ongoing loose stools, gas, bloating, abdominal pain rated 2)    HPI     BP at home   - 150s systolic   - headache-- not new       Stomach   - taking probiotic  - eating yogurt  - gas, bloating, diarrhea every third day   - no blood in stool   - all started early July -- thought it was due to cherries, stopped and no change               Review of Systems  Constitutional, HEENT, cardiovascular, pulmonary, gi and gu systems are negative, except as otherwise noted.      Objective    BP (!) 158/66 (BP Location: Right arm, Patient Position: Sitting, Cuff Size: Adult Regular)   Pulse 58   Temp 96.9  F (36.1  C) (Tympanic)   Resp 18   Ht 1.676 m (5' 6\")   Wt 53.3 kg (117 lb 8 oz)   LMP  (LMP Unknown)   SpO2 91%   BMI 18.96 kg/m    Body mass index is 18.96 kg/m .  Physical Exam "   GENERAL: alert and no distress  RESP: lungs clear to auscultation - no rales, rhonchi or wheezes  CV: regular rate and rhythm, normal S1 S2, no S3 or S4, no murmur, click or rub, no peripheral edema   ABDOMEN: soft, nontender, no hepatosplenomegaly, no masses and bowel sounds normal  PSYCH: mentation appears normal, affect normal/bright    Results for orders placed or performed in visit on 10/04/24 (from the past 24 hour(s))   CRP inflammation   Result Value Ref Range    CRP Inflammation <3.00 <5.00 mg/L     XR Abdomen 1 View    Result Date: 10/4/2024  PROCEDURE: XR ABDOMEN 1 VIEW 10/4/2024 11:47 AM HISTORY: 3+ months of diarrhea and change in stool caliber; Chronic diarrhea COMPARISONS: None. TECHNIQUE: 1 view FINDINGS: The intestinal gas pattern is normal. There is no extraluminal gas or pathologic intra-abdominal calcifications. Advanced arthritic changes are seen in the right hip.        IMPRESSION: Normal abdominal gas pattern RODOLFO ZARAGOZA MD   SYSTEM ID:  [id number]         Signed Electronically by: Sesar Luna MD     brain stem bleed

## 2025-06-09 ENCOUNTER — OFFICE VISIT (OUTPATIENT)
Dept: FAMILY MEDICINE | Facility: OTHER | Age: 89
End: 2025-06-09
Payer: MEDICARE

## 2025-06-09 VITALS
BODY MASS INDEX: 20.26 KG/M2 | OXYGEN SATURATION: 95 % | WEIGHT: 121.6 LBS | HEIGHT: 65 IN | HEART RATE: 66 BPM | DIASTOLIC BLOOD PRESSURE: 66 MMHG | TEMPERATURE: 98.3 F | RESPIRATION RATE: 18 BRPM | SYSTOLIC BLOOD PRESSURE: 118 MMHG

## 2025-06-09 DIAGNOSIS — R52 BODY ACHES: ICD-10-CM

## 2025-06-09 DIAGNOSIS — R53.83 OTHER FATIGUE: ICD-10-CM

## 2025-06-09 DIAGNOSIS — R68.83 CHILLS: ICD-10-CM
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
## 2025-06-09 DIAGNOSIS — A69.20 LYME DISEASE: ICD-10-CM

## 2025-06-09 DIAGNOSIS — S20.161A TICK BITE OF RIGHT BREAST, INITIAL ENCOUNTER: Primary | ICD-10-CM

## 2025-06-09 DIAGNOSIS — Z91.89 AT HIGH RISK FOR TICK BORNE ILLNESS: ICD-10-CM

## 2025-06-09 LAB
ALBUMIN SERPL BCG-MCNC: 3.9 G/DL (ref 3.5–5.2)
ALP SERPL-CCNC: 15 U/L (ref 40–150)
ALT SERPL W P-5'-P-CCNC: 24 U/L (ref 0–50)
ANION GAP SERPL CALCULATED.3IONS-SCNC: 10 MMOL/L (ref 7–15)
AST SERPL W P-5'-P-CCNC: 42 U/L (ref 0–45)
BASOPHILS # BLD AUTO: 0 10E3/UL (ref 0–0.2)
BASOPHILS NFR BLD AUTO: 0 %
BILIRUB SERPL-MCNC: 1.1 MG/DL
BUN SERPL-MCNC: 14.1 MG/DL (ref 8–23)
CALCIUM SERPL-MCNC: 9.3 MG/DL (ref 8.8–10.4)
CHLORIDE SERPL-SCNC: 94 MMOL/L (ref 98–107)
CREAT SERPL-MCNC: 0.83 MG/DL (ref 0.51–0.95)
CRP SERPL-MCNC: 112.34 MG/L
EGFRCR SERPLBLD CKD-EPI 2021: 67 ML/MIN/1.73M2
EOSINOPHIL # BLD AUTO: 0 10E3/UL (ref 0–0.7)
EOSINOPHIL NFR BLD AUTO: 0 %
ERYTHROCYTE [DISTWIDTH] IN BLOOD BY AUTOMATED COUNT: 13.5 % (ref 10–15)
GLUCOSE SERPL-MCNC: 126 MG/DL (ref 70–99)
HCO3 SERPL-SCNC: 29 MMOL/L (ref 22–29)
HCT VFR BLD AUTO: 34.6 % (ref 35–47)
HGB BLD-MCNC: 12 G/DL (ref 11.7–15.7)
IMM GRANULOCYTES # BLD: 0 10E3/UL
IMM GRANULOCYTES NFR BLD: 0 %
LYMPHOCYTES # BLD AUTO: 0.6 10E3/UL (ref 0.8–5.3)
LYMPHOCYTES NFR BLD AUTO: 24 %
MCH RBC QN AUTO: 32.3 PG (ref 26.5–33)
MCHC RBC AUTO-ENTMCNC: 34.7 G/DL (ref 31.5–36.5)
MCV RBC AUTO: 93 FL (ref 78–100)
MONOCYTES # BLD AUTO: 0.2 10E3/UL (ref 0–1.3)
MONOCYTES NFR BLD AUTO: 10 %
NEUTROPHILS # BLD AUTO: 1.6 10E3/UL (ref 1.6–8.3)
NEUTROPHILS NFR BLD AUTO: 66 %
NRBC # BLD AUTO: 0 10E3/UL
NRBC BLD AUTO-RTO: 0 /100
PLAT MORPH BLD: NORMAL
PLATELET # BLD AUTO: 63 10E3/UL (ref 150–450)
POTASSIUM SERPL-SCNC: 3.2 MMOL/L (ref 3.4–5.3)
PROT SERPL-MCNC: 6.6 G/DL (ref 6.4–8.3)
RBC # BLD AUTO: 3.72 10E6/UL (ref 3.8–5.2)
RBC MORPH BLD: NORMAL
SODIUM SERPL-SCNC: 133 MMOL/L (ref 135–145)
WBC # BLD AUTO: 2.4 10E3/UL (ref 4–11)

## 2025-06-09 PROCEDURE — 36415 COLL VENOUS BLD VENIPUNCTURE: CPT | Mod: ZL | Performed by: NURSE PRACTITIONER

## 2025-06-09 PROCEDURE — 86140 C-REACTIVE PROTEIN: CPT | Mod: ZL | Performed by: NURSE PRACTITIONER

## 2025-06-09 PROCEDURE — 86618 LYME DISEASE ANTIBODY: CPT | Mod: ZL | Performed by: NURSE PRACTITIONER

## 2025-06-09 PROCEDURE — 1125F AMNT PAIN NOTED PAIN PRSNT: CPT | Performed by: NURSE PRACTITIONER

## 2025-06-09 PROCEDURE — G0463 HOSPITAL OUTPT CLINIC VISIT: HCPCS

## 2025-06-09 PROCEDURE — 82435 ASSAY OF BLOOD CHLORIDE: CPT | Mod: ZL | Performed by: NURSE PRACTITIONER

## 2025-06-09 PROCEDURE — 87798 DETECT AGENT NOS DNA AMP: CPT | Mod: ZL | Performed by: NURSE PRACTITIONER

## 2025-06-09 PROCEDURE — 99214 OFFICE O/P EST MOD 30 MIN: CPT | Performed by: NURSE PRACTITIONER

## 2025-06-09 PROCEDURE — 85025 COMPLETE CBC W/AUTO DIFF WBC: CPT | Mod: ZL | Performed by: NURSE PRACTITIONER

## 2025-06-09 PROCEDURE — 3074F SYST BP LT 130 MM HG: CPT | Performed by: NURSE PRACTITIONER

## 2025-06-09 PROCEDURE — 3078F DIAST BP <80 MM HG: CPT | Performed by: NURSE PRACTITIONER

## 2025-06-09 RX ORDER — DOXYCYCLINE 100 MG/1
100 CAPSULE ORAL 2 TIMES DAILY
Qty: 28 CAPSULE | Refills: 0 | Status: SHIPPED | OUTPATIENT
Start: 2025-06-09 | End: 2025-06-23

## 2025-06-09 ASSESSMENT — ENCOUNTER SYMPTOMS
EYES NEGATIVE: 1
ACTIVITY CHANGE: 1
FEVER: 1
ARTHRALGIAS: 1
JOINT SWELLING: 1
PSYCHIATRIC NEGATIVE: 1
FATIGUE: 1
APPETITE CHANGE: 1
RESPIRATORY NEGATIVE: 1
GASTROINTESTINAL NEGATIVE: 1
CARDIOVASCULAR NEGATIVE: 1
HEMATOLOGIC/LYMPHATIC NEGATIVE: 1
NEUROLOGICAL NEGATIVE: 1
ENDOCRINE NEGATIVE: 1

## 2025-06-09 ASSESSMENT — PAIN SCALES - GENERAL: PAINLEVEL_OUTOF10: MODERATE PAIN (5)

## 2025-06-09 NOTE — PROGRESS NOTES
Bea Flores  1935    ASSESSMENT/PLAN      Presents to [facility] clinic with multiple tick bites over the last month, body aches, fatigue.  Patient overall does not feel well.  Patient has been eating and drinking less, complains of changes with her urine including decreased urine output and stronger smell.  Labs obtained in [facility] and show thrombocytopenia and lymphopenia.  Patient also has a sodium of 133, potassium of 3.2.  Patient has not been eating and drinking well.  Discussed with patient she needs to increase her hydration and electrolyte intake.  Bananas are a good source of potassium.  CRP elevated at 112.  Patient has multiple signs of a tickborne illness and will start treatment with doxycycline.  Will await tickborne panel but she likely has a tickborne illness.  Patient's vitals are stable and she appears nontoxic.        1. Tick bite of right breast, initial encounter (Primary)  2. Other fatigue  3. Body aches  4. Chills    - CBC and Differential  - Comprehensive Metabolic Panel  - CRP inflammation  - Tick-Borne Disease Panel (Non-Lyme) by PCR  - LYME DISEASE TOTAL ANTIBODIES WITH REFLEX TO CONFIRMATION  - UA with Microscopic reflex to Culture  - May use over-the-counter Tylenol or ibuprofen PRN  - Follow up as needed for new or worsening symptoms    5. Lyme disease vs tickborne illness    - doxycycline hyclate (VIBRAMYCIN) 100 MG capsule; Take 1 capsule (100 mg) by mouth 2 times daily for 14 days.  Dispense: 28 capsule; Refill: 0         *A shared decision making model was used.   *Patient and/or associated parties understood and were agreeable to treatment plan.   *Plan and all results were discussed.   *Explanation of diagnosis, treatment options and risk and benefits of medications reviewed with patient.    *Time was taken to answer all questions.   *Red flags symptoms were discussed and patient was advised when they should return for reevaluation or for prompt emergency evaluation.  "  *Patient was given verbal and written instructions on plan of care. Instructions were printed or are available on Mychart on electronic AVS.   *We discussed potential side effects of any prescribed or recommended therapies, as well as expectations for response to treatments.  *Patient discharged in stable condition    Tina Luis CNP  [facility]    SUBJECTIVE  CHIEF COMPLAINT/ REASON FOR VISIT  Patient presents with:  Generalized Body Aches  Tick Bite     HISTORY OF PRESENT ILLNESS  Bea Flores is a pleasant 89 year old female presents to [facility] clinic today with multiple tick bites over the last month, body aches, fatigue.  Patient overall does not feel well.  Patient has been eating and drinking less, complains of changes with her urine including decreased urine output and stronger smell.         I have reviewed the nursing notes.  I have reviewed allergies, medication list, problem list, and past medical history.    REVIEW OF SYSTEMS  Review of Systems   Constitutional:  Positive for activity change, appetite change, fatigue and fever.   HENT:  Positive for congestion.    Eyes: Negative.    Respiratory: Negative.     Cardiovascular: Negative.    Gastrointestinal: Negative.    Endocrine: Negative.    Genitourinary: Negative.    Musculoskeletal:  Positive for arthralgias and joint swelling.   Skin: Negative.    Neurological: Negative.    Hematological: Negative.    Psychiatric/Behavioral: Negative.     All other systems reviewed and are negative.       VITAL SIGNS  Vitals:    06/09/25 1135   BP: 118/66   BP Location: Right arm   Patient Position: Sitting   Cuff Size: Adult Regular   Pulse: 66   Resp: 18   Temp: 98.3  F (36.8  C)   TempSrc: Temporal   SpO2: 95%   Weight: 55.2 kg (121 lb 9.6 oz)   Height: 1.651 m (5' 5\")      Body mass index is 20.24 kg/m .      OBJECTIVE  PHYSICAL EXAM  Physical Exam  Vitals and nursing note reviewed.   Constitutional:       Appearance: Normal " appearance.   HENT:      Head: Normocephalic.      Right Ear: Tympanic membrane normal.      Left Ear: Tympanic membrane normal.      Nose: Nose normal.      Mouth/Throat:      Mouth: Mucous membranes are moist.   Eyes:      Pupils: Pupils are equal, round, and reactive to light.   Cardiovascular:      Rate and Rhythm: Normal rate and regular rhythm.      Pulses: Normal pulses.      Heart sounds: Normal heart sounds.   Pulmonary:      Effort: Pulmonary effort is normal.      Breath sounds: Normal breath sounds.   Abdominal:      Palpations: Abdomen is soft.   Musculoskeletal:         General: Normal range of motion.      Cervical back: Normal range of motion.   Skin:     General: Skin is warm and dry.      Capillary Refill: Capillary refill takes less than 2 seconds.   Neurological:      General: No focal deficit present.      Mental Status: She is alert.            DIAGNOSTICS  Results for orders placed or performed in visit on 06/09/25   Comprehensive Metabolic Panel     Status: Abnormal   Result Value Ref Range    Sodium 133 (L) 135 - 145 mmol/L    Potassium 3.2 (L) 3.4 - 5.3 mmol/L    Carbon Dioxide (CO2) 29 22 - 29 mmol/L    Anion Gap 10 7 - 15 mmol/L    Urea Nitrogen 14.1 8.0 - 23.0 mg/dL    Creatinine 0.83 0.51 - 0.95 mg/dL    GFR Estimate 67 >60 mL/min/1.73m2    Calcium 9.3 8.8 - 10.4 mg/dL    Chloride 94 (L) 98 - 107 mmol/L    Glucose 126 (H) 70 - 99 mg/dL    Alkaline Phosphatase 15 (L) 40 - 150 U/L    AST 42 0 - 45 U/L    ALT 24 0 - 50 U/L    Protein Total 6.6 6.4 - 8.3 g/dL    Albumin 3.9 3.5 - 5.2 g/dL    Bilirubin Total 1.1 <=1.2 mg/dL   CRP inflammation     Status: Abnormal   Result Value Ref Range    CRP Inflammation 112.34 (H) <5.00 mg/L   CBC with platelets and differential     Status: Abnormal   Result Value Ref Range    WBC Count 2.4 (L) 4.0 - 11.0 10e3/uL    RBC Count 3.72 (L) 3.80 - 5.20 10e6/uL    Hemoglobin 12.0 11.7 - 15.7 g/dL    Hematocrit 34.6 (L) 35.0 - 47.0 %    MCV 93 78 - 100 fL    MCH  32.3 26.5 - 33.0 pg    MCHC 34.7 31.5 - 36.5 g/dL    RDW 13.5 10.0 - 15.0 %    Platelet Count 63 (L) 150 - 450 10e3/uL    % Neutrophils 66 %    % Lymphocytes 24 %    % Monocytes 10 %    % Eosinophils 0 %    % Basophils 0 %    % Immature Granulocytes 0 %    NRBCs per 100 WBC 0 <1 /100    Absolute Neutrophils 1.6 1.6 - 8.3 10e3/uL    Absolute Lymphocytes 0.6 (L) 0.8 - 5.3 10e3/uL    Absolute Monocytes 0.2 0.0 - 1.3 10e3/uL    Absolute Eosinophils 0.0 0.0 - 0.7 10e3/uL    Absolute Basophils 0.0 0.0 - 0.2 10e3/uL    Absolute Immature Granulocytes 0.0 <=0.4 10e3/uL    Absolute NRBCs 0.0 10e3/uL   RBC and Platelet Morphology     Status: None   Result Value Ref Range    RBC Morphology Confirmed RBC Indices     Platelet Assessment  Automated Count Confirmed. Platelet morphology is normal.     Automated Count Confirmed. Platelet morphology is normal.   CBC and Differential     Status: Abnormal    Narrative    The following orders were created for panel order CBC and Differential.  Procedure                               Abnormality         Status                     ---------                               -----------         ------                     CBC with platelets and ...[[id number]]  Abnormal            Final result               RBC and Platelet Morpho...[[id number]]                      Final result                 Please view results for these tests on the individual orders.

## 2025-06-09 NOTE — NURSING NOTE
"Chief Complaint   Patient presents with    Generalized Body Aches    Tick Bite   Patient presents [facility] clinic today for body aches and 4 tick bites. Patient notes symptoms starting Friday. Patient also notes feeling like she has no energy.     Initial /66 (BP Location: Right arm, Patient Position: Sitting, Cuff Size: Adult Regular)   Pulse 66   Temp 98.3  F (36.8  C) (Temporal)   Resp 18   Ht 1.651 m (5' 5\")   Wt 55.2 kg (121 lb 9.6 oz)   LMP  (LMP Unknown)   SpO2 95%   BMI 20.24 kg/m   Estimated body mass index is 20.24 kg/m  as calculated from the following:    Height as of this encounter: 1.651 m (5' 5\").    Weight as of this encounter: 55.2 kg (121 lb 9.6 oz).  Medication Review: complete    The next two questions are to help us understand your food security.  If you are feeling you need any assistance in this area, we have resources available to support you today.          6/9/2025   SDOH- Food Insecurity   Within the past 12 months, did you worry that your food would run out before you got money to buy more? N   Within the past 12 months, did the food you bought just not last and you didn t have money to get more? N         Health Care Directive:  Patient does not have a Health Care Directive: Discussed advance care planning with patient; however, patient declined at this time.    Negrito Remy      "

## 2025-06-10 ENCOUNTER — TELEPHONE (OUTPATIENT)
Dept: FAMILY MEDICINE | Facility: OTHER | Age: 89
End: 2025-06-10
Payer: COMMERCIAL

## 2025-06-10 ENCOUNTER — RESULTS FOLLOW-UP (OUTPATIENT)
Dept: FAMILY MEDICINE | Facility: OTHER | Age: 89
End: 2025-06-10

## 2025-06-10 LAB
A PHAGOCYTOPH DNA BLD QL NAA+PROBE: DETECTED
B BURGDOR IGG+IGM SER QL: 0.03
BABESIA DNA BLD QL NAA+PROBE: NOT DETECTED
EHRLICHIA DNA SPEC QL NAA+PROBE: NOT DETECTED

## 2025-06-10 NOTE — TELEPHONE ENCOUNTER
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 She is on the correct antibiotic. She should complete the full course of antibiotics. \"    After proper verification,patient was notified, and verbalized understanding. No further questions at this time.   Corinne Mcdonnell LPN on 6/10/2025 at 12:46 PM    "

## 2025-07-10 ENCOUNTER — HOSPITAL ENCOUNTER (OUTPATIENT)
Dept: GENERAL RADIOLOGY | Facility: OTHER | Age: 89
End: 2025-07-10
Attending: STUDENT IN AN ORGANIZED HEALTH CARE EDUCATION/TRAINING PROGRAM
Payer: MEDICARE

## 2025-07-10 ENCOUNTER — OFFICE VISIT (OUTPATIENT)
Dept: FAMILY MEDICINE | Facility: OTHER | Age: 89
End: 2025-07-10
Attending: STUDENT IN AN ORGANIZED HEALTH CARE EDUCATION/TRAINING PROGRAM
Payer: MEDICARE

## 2025-07-10 VITALS
RESPIRATION RATE: 18 BRPM | OXYGEN SATURATION: 98 % | SYSTOLIC BLOOD PRESSURE: 124 MMHG | TEMPERATURE: 97 F | DIASTOLIC BLOOD PRESSURE: 70 MMHG | HEIGHT: 65 IN | HEART RATE: 70 BPM | WEIGHT: 120.25 LBS | BODY MASS INDEX: 20.03 KG/M2

## 2025-07-10 DIAGNOSIS — M25.551 HIP PAIN, RIGHT: ICD-10-CM

## 2025-07-10 DIAGNOSIS — M25.551 HIP PAIN, RIGHT: Primary | ICD-10-CM

## 2025-07-10 PROCEDURE — G0463 HOSPITAL OUTPT CLINIC VISIT: HCPCS

## 2025-07-10 PROCEDURE — 73502 X-RAY EXAM HIP UNI 2-3 VIEWS: CPT

## 2025-07-10 ASSESSMENT — PAIN SCALES - GENERAL: PAINLEVEL_OUTOF10: SEVERE PAIN (8)

## 2025-07-10 ASSESSMENT — ENCOUNTER SYMPTOMS: HIP PAIN: 1

## 2025-07-10 NOTE — NURSING NOTE
"Chief Complaint   Patient presents with    Hip Pain     Right hip pain radiating down right leg, pain rated 8 worse at night       Initial /70 (BP Location: Right arm, Patient Position: Sitting, Cuff Size: Adult Regular)   Pulse 70   Temp 97  F (36.1  C) (Tympanic)   Resp 18   Ht 1.651 m (5' 5\")   Wt 54.5 kg (120 lb 4 oz)   LMP  (LMP Unknown)   SpO2 98%   BMI 20.01 kg/m   Estimated body mass index is 20.01 kg/m  as calculated from the following:    Height as of this encounter: 1.651 m (5' 5\").    Weight as of this encounter: 54.5 kg (120 lb 4 oz).  Medication Review: complete    The next two questions are to help us understand your food security.  If you are feeling you need any assistance in this area, we have resources available to support you today.          6/9/2025   SDOH- Food Insecurity   Within the past 12 months, did you worry that your food would run out before you got money to buy more? N   Within the past 12 months, did the food you bought just not last and you didn t have money to get more? N          Health Care Directive:    Patient does not have a Health Care Directive: Discussed advance care planning with patient; however, patient declined at this time.    Jenni Ordonez, CLAUS      "

## 2025-07-10 NOTE — PROGRESS NOTES
"  Assessment & Plan   Problem List Items Addressed This Visit    None  Visit Diagnoses         Hip pain, right    -  Primary    Relevant Orders    XR Hip Right 2-3 Views (Completed)    XR Sacroiliac Joint G/E 3 Views (Completed)    Physical Therapy  Referral           Advanced degeneration   Continue OTC analgesics, she would like to start PT. Referral placed.   Offered sports med referral, she will reach out if she wants     The longitudinal plan of care for the diagnosis(es)/condition(s) as documented were addressed during this visit. Due to the added complexity in care, I will continue to support Bea in the subsequent management and with ongoing continuity of care.          Subjective   Bea is a 89 year old, presenting for the following health issues:  Hip Pain (Right hip pain radiating down right leg, pain rated 8 worse at night)    Hip Pain          Right hip pain   - on going for at least 2 years  - uses biofreeze, daily tylenol and ibuprofen   - has gone to PT in the past  - getting worse               Review of Systems  Constitutional, HEENT, cardiovascular, pulmonary, gi and gu systems are negative, except as otherwise noted.      Objective    /70 (BP Location: Right arm, Patient Position: Sitting, Cuff Size: Adult Regular)   Pulse 70   Temp 97  F (36.1  C) (Tympanic)   Resp 18   Ht 1.651 m (5' 5\")   Wt 54.5 kg (120 lb 4 oz)   LMP  (LMP Unknown)   SpO2 98%   BMI 20.01 kg/m    Body mass index is 20.01 kg/m .  Physical Exam   GENERAL: alert and no distress  MS: right low back/si joint tender to palpation, normal hip ROM, no pain with internal or external rotation   NEURO: Normal strength and tone, mentation intact and speech normal  PSYCH: mentation appears normal, affect normal/bright    XR Hip Right 2-3 Views  Result Date: 7/10/2025  Exam: XR SACROILIAC JOINT G/E 3 VIEWS, XR HIP RIGHT 2-3 VIEWS Technique: AP pelvis and right hip, 3 views, and the sacroiliac joints, 3 views " Comparison: 10/4/2024, 2/18/2021 Exam reason: right sided hip/low back pain; Hip pain, right Findings: No acute fracture or dislocation. Advanced degenerative changes of the right hip with associated subchondral sclerosis and subchondral cystic change, similar to 10/4/2024 and progressed since 2021. Mild degenerative changes of the SI joints. Soft tissues appear normal.     Impression: No acute fracture or dislocation. Advanced degenerative changes of the right hip. SANJUANITA FLETCHER MD   SYSTEM ID:  [id number]    XR Sacroiliac Joint G/E 3 Views  Result Date: 7/10/2025  Exam: XR SACROILIAC JOINT G/E 3 VIEWS, XR HIP RIGHT 2-3 VIEWS Technique: AP pelvis and right hip, 3 views, and the sacroiliac joints, 3 views Comparison: 10/4/2024, 2/18/2021 Exam reason: right sided hip/low back pain; Hip pain, right Findings: No acute fracture or dislocation. Advanced degenerative changes of the right hip with associated subchondral sclerosis and subchondral cystic change, similar to 10/4/2024 and progressed since 2021. Mild degenerative changes of the SI joints. Soft tissues appear normal.     Impression: No acute fracture or dislocation. Advanced degenerative changes of the right hip. SANJUANITA FLETCHER MD   SYSTEM ID:  [id number]        Signed Electronically by: Sesar Luna MD

## 2025-08-08 ENCOUNTER — TRANSFERRED RECORDS (OUTPATIENT)
Dept: HEALTH INFORMATION MANAGEMENT | Facility: OTHER | Age: 89
End: 2025-08-08
Payer: COMMERCIAL

## (undated) RX ORDER — POTASSIUM CHLORIDE 1500 MG/1
TABLET, EXTENDED RELEASE ORAL
Status: DISPENSED
Start: 2019-01-25